# Patient Record
Sex: MALE | Race: WHITE | NOT HISPANIC OR LATINO | Employment: OTHER | URBAN - METROPOLITAN AREA
[De-identification: names, ages, dates, MRNs, and addresses within clinical notes are randomized per-mention and may not be internally consistent; named-entity substitution may affect disease eponyms.]

---

## 2018-05-30 ENCOUNTER — APPOINTMENT (OUTPATIENT)
Dept: RADIOLOGY | Facility: MEDICAL CENTER | Age: 63
DRG: 003 | End: 2018-05-30
Attending: EMERGENCY MEDICINE
Payer: COMMERCIAL

## 2018-05-30 ENCOUNTER — RESOLUTE PROFESSIONAL BILLING HOSPITAL PROF FEE (OUTPATIENT)
Dept: HOSPITALIST | Facility: MEDICAL CENTER | Age: 63
End: 2018-05-30
Payer: COMMERCIAL

## 2018-05-30 ENCOUNTER — HOSPITAL ENCOUNTER (INPATIENT)
Facility: MEDICAL CENTER | Age: 63
LOS: 34 days | DRG: 003 | End: 2018-07-03
Attending: EMERGENCY MEDICINE | Admitting: SURGERY
Payer: COMMERCIAL

## 2018-05-30 DIAGNOSIS — G93.40 ENCEPHALOPATHY ACUTE: ICD-10-CM

## 2018-05-30 DIAGNOSIS — J95.821 RESPIRATORY FAILURE FOLLOWING TRAUMA AND SURGERY (HCC): ICD-10-CM

## 2018-05-30 DIAGNOSIS — I63.10 CEREBROVASCULAR ACCIDENT (CVA) DUE TO EMBOLISM OF PRECEREBRAL ARTERY (HCC): ICD-10-CM

## 2018-05-30 PROBLEM — S42.009A CLOSED FRACTURE OF CLAVICLE: Status: ACTIVE | Noted: 2018-05-30

## 2018-05-30 PROBLEM — S22.41XA CLOSED FRACTURE OF MULTIPLE RIBS OF RIGHT SIDE: Status: ACTIVE | Noted: 2018-05-30

## 2018-05-30 PROBLEM — M89.8X1 CLAVICLE PAIN: Status: ACTIVE | Noted: 2018-05-30

## 2018-05-30 PROBLEM — J94.2 HEMOPNEUMOTHORAX: Status: ACTIVE | Noted: 2018-05-30

## 2018-05-30 PROBLEM — S42.109A SCAPULA FRACTURE: Status: ACTIVE | Noted: 2018-05-30

## 2018-05-30 PROBLEM — T14.90XA TRAUMA: Status: ACTIVE | Noted: 2018-05-30

## 2018-05-30 PROBLEM — Z53.09: Status: ACTIVE | Noted: 2018-05-30

## 2018-05-30 PROBLEM — K76.0 HEPATIC STEATOSIS: Status: ACTIVE | Noted: 2018-05-30

## 2018-05-30 LAB
ABO GROUP BLD: NORMAL
ALBUMIN SERPL BCP-MCNC: 4.3 G/DL (ref 3.2–4.9)
ALBUMIN/GLOB SERPL: 1.4 G/DL
ALP SERPL-CCNC: 71 U/L (ref 30–99)
ALT SERPL-CCNC: 56 U/L (ref 2–50)
ANION GAP SERPL CALC-SCNC: 9 MMOL/L (ref 0–11.9)
APTT PPP: 26.3 SEC (ref 24.7–36)
AST SERPL-CCNC: 51 U/L (ref 12–45)
BILIRUB SERPL-MCNC: 0.5 MG/DL (ref 0.1–1.5)
BLD GP AB SCN SERPL QL: NORMAL
BUN SERPL-MCNC: 16 MG/DL (ref 8–22)
CALCIUM SERPL-MCNC: 9.1 MG/DL (ref 8.5–10.5)
CHLORIDE SERPL-SCNC: 107 MMOL/L (ref 96–112)
CO2 SERPL-SCNC: 24 MMOL/L (ref 20–33)
CREAT SERPL-MCNC: 0.87 MG/DL (ref 0.5–1.4)
ERYTHROCYTE [DISTWIDTH] IN BLOOD BY AUTOMATED COUNT: 40.8 FL (ref 35.9–50)
ETHANOL BLD-MCNC: 0.01 G/DL
GLOBULIN SER CALC-MCNC: 3 G/DL (ref 1.9–3.5)
GLUCOSE SERPL-MCNC: 87 MG/DL (ref 65–99)
HCT VFR BLD AUTO: 44.1 % (ref 42–52)
HGB BLD-MCNC: 15.2 G/DL (ref 14–18)
INR PPP: 1.06 (ref 0.87–1.13)
MCH RBC QN AUTO: 31.3 PG (ref 27–33)
MCHC RBC AUTO-ENTMCNC: 34.5 G/DL (ref 33.7–35.3)
MCV RBC AUTO: 90.7 FL (ref 81.4–97.8)
PLATELET # BLD AUTO: 240 K/UL (ref 164–446)
PMV BLD AUTO: 10.8 FL (ref 9–12.9)
POTASSIUM SERPL-SCNC: 3.9 MMOL/L (ref 3.6–5.5)
PROT SERPL-MCNC: 7.3 G/DL (ref 6–8.2)
PROTHROMBIN TIME: 13.5 SEC (ref 12–14.6)
RBC # BLD AUTO: 4.86 M/UL (ref 4.7–6.1)
RH BLD: NORMAL
SODIUM SERPL-SCNC: 140 MMOL/L (ref 135–145)
WBC # BLD AUTO: 11 K/UL (ref 4.8–10.8)

## 2018-05-30 PROCEDURE — 85730 THROMBOPLASTIN TIME PARTIAL: CPT

## 2018-05-30 PROCEDURE — 86850 RBC ANTIBODY SCREEN: CPT

## 2018-05-30 PROCEDURE — 80053 COMPREHEN METABOLIC PANEL: CPT

## 2018-05-30 PROCEDURE — 72125 CT NECK SPINE W/O DYE: CPT

## 2018-05-30 PROCEDURE — 85027 COMPLETE CBC AUTOMATED: CPT

## 2018-05-30 PROCEDURE — 85610 PROTHROMBIN TIME: CPT

## 2018-05-30 PROCEDURE — 99291 CRITICAL CARE FIRST HOUR: CPT

## 2018-05-30 PROCEDURE — 72131 CT LUMBAR SPINE W/O DYE: CPT

## 2018-05-30 PROCEDURE — 71045 X-RAY EXAM CHEST 1 VIEW: CPT

## 2018-05-30 PROCEDURE — 700117 HCHG RX CONTRAST REV CODE 255: Performed by: EMERGENCY MEDICINE

## 2018-05-30 PROCEDURE — A9270 NON-COVERED ITEM OR SERVICE: HCPCS | Performed by: SURGERY

## 2018-05-30 PROCEDURE — 700112 HCHG RX REV CODE 229: Performed by: SURGERY

## 2018-05-30 PROCEDURE — 73030 X-RAY EXAM OF SHOULDER: CPT | Mod: RT

## 2018-05-30 PROCEDURE — 700111 HCHG RX REV CODE 636 W/ 250 OVERRIDE (IP): Performed by: SURGERY

## 2018-05-30 PROCEDURE — 80307 DRUG TEST PRSMV CHEM ANLYZR: CPT

## 2018-05-30 PROCEDURE — 86901 BLOOD TYPING SEROLOGIC RH(D): CPT

## 2018-05-30 PROCEDURE — 86900 BLOOD TYPING SEROLOGIC ABO: CPT

## 2018-05-30 PROCEDURE — G0390 TRAUMA RESPONS W/HOSP CRITI: HCPCS

## 2018-05-30 PROCEDURE — 700105 HCHG RX REV CODE 258: Performed by: SURGERY

## 2018-05-30 PROCEDURE — 70450 CT HEAD/BRAIN W/O DYE: CPT

## 2018-05-30 PROCEDURE — 770022 HCHG ROOM/CARE - ICU (200)

## 2018-05-30 PROCEDURE — 72128 CT CHEST SPINE W/O DYE: CPT

## 2018-05-30 PROCEDURE — 71260 CT THORAX DX C+: CPT

## 2018-05-30 PROCEDURE — 700102 HCHG RX REV CODE 250 W/ 637 OVERRIDE(OP): Performed by: SURGERY

## 2018-05-30 RX ORDER — CYCLOBENZAPRINE HCL 10 MG
10 TABLET ORAL 3 TIMES DAILY PRN
COMMUNITY

## 2018-05-30 RX ORDER — AMOXICILLIN 250 MG
1 CAPSULE ORAL NIGHTLY
Status: DISCONTINUED | OUTPATIENT
Start: 2018-05-30 | End: 2018-07-04 | Stop reason: HOSPADM

## 2018-05-30 RX ORDER — MORPHINE SULFATE 4 MG/ML
4 INJECTION, SOLUTION INTRAMUSCULAR; INTRAVENOUS
Status: DISCONTINUED | OUTPATIENT
Start: 2018-05-30 | End: 2018-05-30

## 2018-05-30 RX ORDER — ENEMA 19; 7 G/133ML; G/133ML
1 ENEMA RECTAL
Status: DISCONTINUED | OUTPATIENT
Start: 2018-05-30 | End: 2018-07-04 | Stop reason: HOSPADM

## 2018-05-30 RX ORDER — MORPHINE SULFATE 4 MG/ML
4 INJECTION, SOLUTION INTRAMUSCULAR; INTRAVENOUS
Status: DISCONTINUED | OUTPATIENT
Start: 2018-05-30 | End: 2018-06-01

## 2018-05-30 RX ORDER — HYDROCODONE BITARTRATE AND ACETAMINOPHEN 10; 325 MG/1; MG/1
1 TABLET ORAL EVERY 8 HOURS PRN
COMMUNITY

## 2018-05-30 RX ORDER — OXYCODONE HYDROCHLORIDE 10 MG/1
10 TABLET ORAL
Status: DISCONTINUED | OUTPATIENT
Start: 2018-05-30 | End: 2018-06-06

## 2018-05-30 RX ORDER — TAMSULOSIN HYDROCHLORIDE 0.4 MG/1
0.4 CAPSULE ORAL EVERY EVENING
COMMUNITY

## 2018-05-30 RX ORDER — ONDANSETRON 2 MG/ML
4 INJECTION INTRAMUSCULAR; INTRAVENOUS EVERY 4 HOURS PRN
Status: DISCONTINUED | OUTPATIENT
Start: 2018-05-30 | End: 2018-07-04 | Stop reason: HOSPADM

## 2018-05-30 RX ORDER — FLUTICASONE PROPIONATE AND SALMETEROL XINAFOATE 115; 21 UG/1; UG/1
1 AEROSOL, METERED RESPIRATORY (INHALATION) 2 TIMES DAILY
COMMUNITY

## 2018-05-30 RX ORDER — FINASTERIDE 5 MG/1
5 TABLET, FILM COATED ORAL EVERY EVENING
COMMUNITY

## 2018-05-30 RX ORDER — SODIUM CHLORIDE, SODIUM LACTATE, POTASSIUM CHLORIDE, CALCIUM CHLORIDE 600; 310; 30; 20 MG/100ML; MG/100ML; MG/100ML; MG/100ML
INJECTION, SOLUTION INTRAVENOUS CONTINUOUS
Status: DISCONTINUED | OUTPATIENT
Start: 2018-05-30 | End: 2018-06-02

## 2018-05-30 RX ORDER — DOCUSATE SODIUM 100 MG/1
100 CAPSULE, LIQUID FILLED ORAL 2 TIMES DAILY
Status: DISCONTINUED | OUTPATIENT
Start: 2018-05-30 | End: 2018-06-06

## 2018-05-30 RX ORDER — AMOXICILLIN 250 MG
1 CAPSULE ORAL
Status: DISCONTINUED | OUTPATIENT
Start: 2018-05-30 | End: 2018-07-04 | Stop reason: HOSPADM

## 2018-05-30 RX ORDER — ALBUTEROL SULFATE 90 UG/1
2 AEROSOL, METERED RESPIRATORY (INHALATION) EVERY 4 HOURS PRN
Status: DISCONTINUED | OUTPATIENT
Start: 2018-05-30 | End: 2018-07-04 | Stop reason: HOSPADM

## 2018-05-30 RX ORDER — BUDESONIDE AND FORMOTEROL FUMARATE DIHYDRATE 160; 4.5 UG/1; UG/1
2 AEROSOL RESPIRATORY (INHALATION) 2 TIMES DAILY
Status: DISCONTINUED | OUTPATIENT
Start: 2018-05-30 | End: 2018-06-01 | Stop reason: SDUPTHER

## 2018-05-30 RX ORDER — BISACODYL 10 MG
10 SUPPOSITORY, RECTAL RECTAL
Status: DISCONTINUED | OUTPATIENT
Start: 2018-05-30 | End: 2018-07-04 | Stop reason: HOSPADM

## 2018-05-30 RX ORDER — NAPROXEN 500 MG/1
500 TABLET ORAL 2 TIMES DAILY PRN
COMMUNITY

## 2018-05-30 RX ORDER — OXYCODONE HYDROCHLORIDE 5 MG/1
5 TABLET ORAL
Status: DISCONTINUED | OUTPATIENT
Start: 2018-05-30 | End: 2018-06-06

## 2018-05-30 RX ORDER — KETOROLAC TROMETHAMINE 30 MG/ML
30 INJECTION, SOLUTION INTRAMUSCULAR; INTRAVENOUS EVERY 6 HOURS
Status: DISCONTINUED | OUTPATIENT
Start: 2018-05-30 | End: 2018-05-31

## 2018-05-30 RX ORDER — POLYETHYLENE GLYCOL 3350 17 G/17G
1 POWDER, FOR SOLUTION ORAL 2 TIMES DAILY
Status: DISCONTINUED | OUTPATIENT
Start: 2018-05-30 | End: 2018-07-04 | Stop reason: HOSPADM

## 2018-05-30 RX ORDER — FAMOTIDINE 20 MG/1
20 TABLET, FILM COATED ORAL 2 TIMES DAILY
Status: DISCONTINUED | OUTPATIENT
Start: 2018-05-30 | End: 2018-07-04 | Stop reason: HOSPADM

## 2018-05-30 RX ADMIN — FAMOTIDINE 20 MG: 20 TABLET ORAL at 21:12

## 2018-05-30 RX ADMIN — MORPHINE SULFATE 4 MG: 4 INJECTION INTRAVENOUS at 22:45

## 2018-05-30 RX ADMIN — MORPHINE SULFATE 4 MG: 4 INJECTION INTRAVENOUS at 20:00

## 2018-05-30 RX ADMIN — OXYCODONE HYDROCHLORIDE 10 MG: 10 TABLET ORAL at 17:16

## 2018-05-30 RX ADMIN — KETOROLAC TROMETHAMINE 30 MG: 30 INJECTION, SOLUTION INTRAMUSCULAR; INTRAVENOUS at 23:30

## 2018-05-30 RX ADMIN — IOHEXOL 100 ML: 350 INJECTION, SOLUTION INTRAVENOUS at 16:02

## 2018-05-30 RX ADMIN — DOCUSATE SODIUM 100 MG: 100 CAPSULE ORAL at 21:11

## 2018-05-30 RX ADMIN — OXYCODONE HYDROCHLORIDE 10 MG: 10 TABLET ORAL at 21:15

## 2018-05-30 RX ADMIN — SENNOSIDES AND DOCUSATE SODIUM 1 TABLET: 8.6; 5 TABLET ORAL at 21:12

## 2018-05-30 RX ADMIN — KETOROLAC TROMETHAMINE 30 MG: 30 INJECTION, SOLUTION INTRAMUSCULAR; INTRAVENOUS at 17:14

## 2018-05-30 RX ADMIN — SODIUM CHLORIDE, POTASSIUM CHLORIDE, SODIUM LACTATE AND CALCIUM CHLORIDE: 600; 310; 30; 20 INJECTION, SOLUTION INTRAVENOUS at 17:15

## 2018-05-30 RX ADMIN — MORPHINE SULFATE 4 MG: 4 INJECTION INTRAVENOUS at 17:12

## 2018-05-30 ASSESSMENT — PAIN SCALES - GENERAL
PAINLEVEL_OUTOF10: 8
PAINLEVEL_OUTOF10: 4
PAINLEVEL_OUTOF10: 9
PAINLEVEL_OUTOF10: 7
PAINLEVEL_OUTOF10: 6
PAINLEVEL_OUTOF10: 8
PAINLEVEL_OUTOF10: 7

## 2018-05-30 ASSESSMENT — COPD QUESTIONNAIRES
DO YOU EVER COUGH UP ANY MUCUS OR PHLEGM?: NO/ONLY WITH OCCASIONAL COLDS OR INFECTIONS
DURING THE PAST 4 WEEKS HOW MUCH DID YOU FEEL SHORT OF BREATH: NONE/LITTLE OF THE TIME
COPD SCREENING SCORE: 2
HAVE YOU SMOKED AT LEAST 100 CIGARETTES IN YOUR ENTIRE LIFE: NO/DON'T KNOW
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
## 2018-05-31 ENCOUNTER — APPOINTMENT (OUTPATIENT)
Dept: RADIOLOGY | Facility: MEDICAL CENTER | Age: 63
DRG: 003 | End: 2018-05-31
Attending: SURGERY
Payer: COMMERCIAL

## 2018-05-31 PROBLEM — S22.5XXA FLAIL CHEST, INITIAL ENCOUNTER FOR CLOSED FRACTURE: Status: ACTIVE | Noted: 2018-05-30

## 2018-05-31 LAB
ABO GROUP BLD: NORMAL
ALBUMIN SERPL BCP-MCNC: 3.9 G/DL (ref 3.2–4.9)
ALBUMIN/GLOB SERPL: 1.4 G/DL
ALP SERPL-CCNC: 70 U/L (ref 30–99)
ALT SERPL-CCNC: 48 U/L (ref 2–50)
ANION GAP SERPL CALC-SCNC: 12 MMOL/L (ref 0–11.9)
AST SERPL-CCNC: 50 U/L (ref 12–45)
BASOPHILS # BLD AUTO: 0.3 % (ref 0–1.8)
BASOPHILS # BLD: 0.02 K/UL (ref 0–0.12)
BILIRUB SERPL-MCNC: 0.7 MG/DL (ref 0.1–1.5)
BUN SERPL-MCNC: 18 MG/DL (ref 8–22)
CALCIUM SERPL-MCNC: 9 MG/DL (ref 8.5–10.5)
CFT BLD TEG: 5.3 MIN (ref 5–10)
CHLORIDE SERPL-SCNC: 106 MMOL/L (ref 96–112)
CLOT ANGLE BLD TEG: 64.3 DEGREES (ref 53–72)
CLOT LYSIS 30M P MA LENFR BLD TEG: 13.4 % (ref 0–8)
CO2 SERPL-SCNC: 22 MMOL/L (ref 20–33)
CREAT SERPL-MCNC: 0.98 MG/DL (ref 0.5–1.4)
CT.EXTRINSIC BLD ROTEM: 2 MIN (ref 1–3)
EOSINOPHIL # BLD AUTO: 0.2 K/UL (ref 0–0.51)
EOSINOPHIL NFR BLD: 2.6 % (ref 0–6.9)
ERYTHROCYTE [DISTWIDTH] IN BLOOD BY AUTOMATED COUNT: 42.5 FL (ref 35.9–50)
GLOBULIN SER CALC-MCNC: 2.8 G/DL (ref 1.9–3.5)
GLUCOSE SERPL-MCNC: 110 MG/DL (ref 65–99)
HCT VFR BLD AUTO: 40.6 % (ref 42–52)
HGB BLD-MCNC: 13.9 G/DL (ref 14–18)
IMM GRANULOCYTES # BLD AUTO: 0.01 K/UL (ref 0–0.11)
IMM GRANULOCYTES NFR BLD AUTO: 0.1 % (ref 0–0.9)
LYMPHOCYTES # BLD AUTO: 1.5 K/UL (ref 1–4.8)
LYMPHOCYTES NFR BLD: 19.4 % (ref 22–41)
MAGNESIUM SERPL-MCNC: 2.2 MG/DL (ref 1.5–2.5)
MCF BLD TEG: 63.2 MM (ref 50–70)
MCH RBC QN AUTO: 31.2 PG (ref 27–33)
MCHC RBC AUTO-ENTMCNC: 34.2 G/DL (ref 33.7–35.3)
MCV RBC AUTO: 91 FL (ref 81.4–97.8)
MONOCYTES # BLD AUTO: 0.62 K/UL (ref 0–0.85)
MONOCYTES NFR BLD AUTO: 8 % (ref 0–13.4)
NEUTROPHILS # BLD AUTO: 5.38 K/UL (ref 1.82–7.42)
NEUTROPHILS NFR BLD: 69.6 % (ref 44–72)
NRBC # BLD AUTO: 0 K/UL
NRBC BLD-RTO: 0 /100 WBC
PA AA BLD-ACNC: 83 %
PA ADP BLD-ACNC: 9.5 %
PHOSPHATE SERPL-MCNC: 3.7 MG/DL (ref 2.5–4.5)
PLATELET # BLD AUTO: 215 K/UL (ref 164–446)
PMV BLD AUTO: 10.9 FL (ref 9–12.9)
POTASSIUM SERPL-SCNC: 4 MMOL/L (ref 3.6–5.5)
PROT SERPL-MCNC: 6.7 G/DL (ref 6–8.2)
RBC # BLD AUTO: 4.46 M/UL (ref 4.7–6.1)
RH BLD: NORMAL
SODIUM SERPL-SCNC: 140 MMOL/L (ref 135–145)
TEG ALGORITHM TGALG: ABNORMAL
WBC # BLD AUTO: 7.7 K/UL (ref 4.8–10.8)

## 2018-05-31 PROCEDURE — 85384 FIBRINOGEN ACTIVITY: CPT

## 2018-05-31 PROCEDURE — A9270 NON-COVERED ITEM OR SERVICE: HCPCS | Performed by: SURGERY

## 2018-05-31 PROCEDURE — 700111 HCHG RX REV CODE 636 W/ 250 OVERRIDE (IP): Performed by: SURGERY

## 2018-05-31 PROCEDURE — 770022 HCHG ROOM/CARE - ICU (200)

## 2018-05-31 PROCEDURE — 700102 HCHG RX REV CODE 250 W/ 637 OVERRIDE(OP): Performed by: SURGERY

## 2018-05-31 PROCEDURE — 80053 COMPREHEN METABOLIC PANEL: CPT

## 2018-05-31 PROCEDURE — 99233 SBSQ HOSP IP/OBS HIGH 50: CPT | Performed by: SURGERY

## 2018-05-31 PROCEDURE — 85576 BLOOD PLATELET AGGREGATION: CPT | Mod: 91

## 2018-05-31 PROCEDURE — 85347 COAGULATION TIME ACTIVATED: CPT

## 2018-05-31 PROCEDURE — 700112 HCHG RX REV CODE 229: Performed by: SURGERY

## 2018-05-31 PROCEDURE — 84100 ASSAY OF PHOSPHORUS: CPT

## 2018-05-31 PROCEDURE — 51798 US URINE CAPACITY MEASURE: CPT

## 2018-05-31 PROCEDURE — 71045 X-RAY EXAM CHEST 1 VIEW: CPT

## 2018-05-31 PROCEDURE — 85025 COMPLETE CBC W/AUTO DIFF WBC: CPT

## 2018-05-31 PROCEDURE — 700101 HCHG RX REV CODE 250: Performed by: SURGERY

## 2018-05-31 PROCEDURE — 700105 HCHG RX REV CODE 258: Performed by: SURGERY

## 2018-05-31 PROCEDURE — 83735 ASSAY OF MAGNESIUM: CPT

## 2018-05-31 RX ORDER — TAMSULOSIN HYDROCHLORIDE 0.4 MG/1
0.4 CAPSULE ORAL
Status: DISCONTINUED | OUTPATIENT
Start: 2018-05-31 | End: 2018-06-01

## 2018-05-31 RX ORDER — GABAPENTIN 100 MG/1
100 CAPSULE ORAL EVERY 8 HOURS
Status: DISCONTINUED | OUTPATIENT
Start: 2018-05-31 | End: 2018-05-31

## 2018-05-31 RX ORDER — ACETAMINOPHEN 325 MG/1
650 TABLET ORAL EVERY 6 HOURS
Status: DISCONTINUED | OUTPATIENT
Start: 2018-05-31 | End: 2018-06-08

## 2018-05-31 RX ORDER — ACETAMINOPHEN 650 MG/1
650 SUPPOSITORY RECTAL EVERY 4 HOURS PRN
Status: DISCONTINUED | OUTPATIENT
Start: 2018-05-31 | End: 2018-05-31

## 2018-05-31 RX ORDER — IPRATROPIUM BROMIDE AND ALBUTEROL SULFATE 2.5; .5 MG/3ML; MG/3ML
3 SOLUTION RESPIRATORY (INHALATION)
Status: DISCONTINUED | OUTPATIENT
Start: 2018-05-31 | End: 2018-07-04 | Stop reason: HOSPADM

## 2018-05-31 RX ORDER — ACETAMINOPHEN 325 MG/1
650 TABLET ORAL EVERY 4 HOURS PRN
Status: DISCONTINUED | OUTPATIENT
Start: 2018-05-31 | End: 2018-05-31

## 2018-05-31 RX ORDER — CELECOXIB 200 MG/1
200 CAPSULE ORAL DAILY
Status: DISCONTINUED | OUTPATIENT
Start: 2018-05-31 | End: 2018-06-10

## 2018-05-31 RX ADMIN — DOCUSATE SODIUM 100 MG: 100 CAPSULE ORAL at 07:48

## 2018-05-31 RX ADMIN — OXYCODONE HYDROCHLORIDE 10 MG: 10 TABLET ORAL at 03:00

## 2018-05-31 RX ADMIN — SODIUM CHLORIDE, POTASSIUM CHLORIDE, SODIUM LACTATE AND CALCIUM CHLORIDE: 600; 310; 30; 20 INJECTION, SOLUTION INTRAVENOUS at 13:26

## 2018-05-31 RX ADMIN — MORPHINE SULFATE 4 MG: 4 INJECTION INTRAVENOUS at 01:30

## 2018-05-31 RX ADMIN — IPRATROPIUM BROMIDE AND ALBUTEROL SULFATE 3 ML: .5; 3 SOLUTION RESPIRATORY (INHALATION) at 19:04

## 2018-05-31 RX ADMIN — MORPHINE SULFATE 4 MG: 4 INJECTION INTRAVENOUS at 16:56

## 2018-05-31 RX ADMIN — POLYETHYLENE GLYCOL 3350 1 PACKET: 17 POWDER, FOR SOLUTION ORAL at 07:49

## 2018-05-31 RX ADMIN — ACETAMINOPHEN 650 MG: 325 TABLET, FILM COATED ORAL at 23:49

## 2018-05-31 RX ADMIN — MORPHINE SULFATE 4 MG: 4 INJECTION INTRAVENOUS at 17:59

## 2018-05-31 RX ADMIN — OXYCODONE HYDROCHLORIDE 10 MG: 10 TABLET ORAL at 00:00

## 2018-05-31 RX ADMIN — MORPHINE SULFATE 2 MG: 4 INJECTION INTRAVENOUS at 00:30

## 2018-05-31 RX ADMIN — ACETAMINOPHEN 650 MG: 325 TABLET, FILM COATED ORAL at 13:23

## 2018-05-31 RX ADMIN — BUDESONIDE AND FORMOTEROL FUMARATE DIHYDRATE 2 PUFF: 160; 4.5 AEROSOL RESPIRATORY (INHALATION) at 07:51

## 2018-05-31 RX ADMIN — BUDESONIDE AND FORMOTEROL FUMARATE DIHYDRATE 2 PUFF: 160; 4.5 AEROSOL RESPIRATORY (INHALATION) at 00:00

## 2018-05-31 RX ADMIN — SENNOSIDES AND DOCUSATE SODIUM 1 TABLET: 8.6; 5 TABLET ORAL at 20:23

## 2018-05-31 RX ADMIN — BUDESONIDE AND FORMOTEROL FUMARATE DIHYDRATE 2 PUFF: 160; 4.5 AEROSOL RESPIRATORY (INHALATION) at 21:00

## 2018-05-31 RX ADMIN — POLYETHYLENE GLYCOL 3350 1 PACKET: 17 POWDER, FOR SOLUTION ORAL at 20:22

## 2018-05-31 RX ADMIN — ONDANSETRON HYDROCHLORIDE 4 MG: 2 INJECTION, SOLUTION INTRAMUSCULAR; INTRAVENOUS at 18:38

## 2018-05-31 RX ADMIN — OXYCODONE HYDROCHLORIDE 10 MG: 10 TABLET ORAL at 20:30

## 2018-05-31 RX ADMIN — OXYCODONE HYDROCHLORIDE 10 MG: 10 TABLET ORAL at 06:00

## 2018-05-31 RX ADMIN — OXYCODONE HYDROCHLORIDE 10 MG: 10 TABLET ORAL at 14:19

## 2018-05-31 RX ADMIN — CELECOXIB 200 MG: 200 CAPSULE ORAL at 08:48

## 2018-05-31 RX ADMIN — TAMSULOSIN HYDROCHLORIDE 0.4 MG: 0.4 CAPSULE ORAL at 20:23

## 2018-05-31 RX ADMIN — OXYCODONE HYDROCHLORIDE 10 MG: 10 TABLET ORAL at 10:34

## 2018-05-31 RX ADMIN — ACETAMINOPHEN 650 MG: 325 TABLET, FILM COATED ORAL at 17:00

## 2018-05-31 RX ADMIN — MORPHINE SULFATE 4 MG: 4 INJECTION INTRAVENOUS at 05:00

## 2018-05-31 RX ADMIN — MORPHINE SULFATE 4 MG: 4 INJECTION INTRAVENOUS at 02:15

## 2018-05-31 RX ADMIN — FAMOTIDINE 20 MG: 20 TABLET ORAL at 07:48

## 2018-05-31 RX ADMIN — MORPHINE SULFATE 4 MG: 4 INJECTION INTRAVENOUS at 10:54

## 2018-05-31 RX ADMIN — KETOROLAC TROMETHAMINE 30 MG: 30 INJECTION, SOLUTION INTRAMUSCULAR; INTRAVENOUS at 06:00

## 2018-05-31 RX ADMIN — MORPHINE SULFATE 4 MG: 4 INJECTION INTRAVENOUS at 04:00

## 2018-05-31 RX ADMIN — FAMOTIDINE 20 MG: 20 TABLET ORAL at 20:23

## 2018-05-31 RX ADMIN — MORPHINE SULFATE 4 MG: 4 INJECTION INTRAVENOUS at 22:41

## 2018-05-31 RX ADMIN — DOCUSATE SODIUM 100 MG: 100 CAPSULE ORAL at 20:23

## 2018-05-31 RX ADMIN — MORPHINE SULFATE 4 MG: 4 INJECTION INTRAVENOUS at 08:49

## 2018-05-31 ASSESSMENT — PAIN SCALES - GENERAL
PAINLEVEL_OUTOF10: 6
PAINLEVEL_OUTOF10: 5
PAINLEVEL_OUTOF10: 7
PAINLEVEL_OUTOF10: 7
PAINLEVEL_OUTOF10: 8
PAINLEVEL_OUTOF10: 5
PAINLEVEL_OUTOF10: 7
PAINLEVEL_OUTOF10: 6
PAINLEVEL_OUTOF10: 4
PAINLEVEL_OUTOF10: 10
PAINLEVEL_OUTOF10: 8
PAINLEVEL_OUTOF10: 8
PAINLEVEL_OUTOF10: 6
PAINLEVEL_OUTOF10: 3
PAINLEVEL_OUTOF10: 4
PAINLEVEL_OUTOF10: 4
PAINLEVEL_OUTOF10: 2
PAINLEVEL_OUTOF10: 6
PAINLEVEL_OUTOF10: 5
PAINLEVEL_OUTOF10: 3
PAINLEVEL_OUTOF10: 2
PAINLEVEL_OUTOF10: 6
PAINLEVEL_OUTOF10: 7

## 2018-05-31 NOTE — CARE PLAN
Problem: Pain Management  Goal: Pain level will decrease to patient's comfort goal    Intervention: Follow pain managment plan developed in collaboration with patient and Interdisciplinary Team  Patients pain poorly controlled with current pharmacologic pain regimen; Non-pharmacologic measures utilized: repositioning, ice pack and Right arm immobilization      Problem: Mobility  Goal: Risk for activity intolerance will decrease    Intervention: Assess and monitor signs of activity intolerance  Patient  mobilized to EOB x 5 minutes; Pt did not tolerate EOB; Patient unable to participate in mobilization with maximum assist.
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
## 2018-05-31 NOTE — PROGRESS NOTES
HD#2  Right clavicle fracture  Sling for comfort  If no improvement in pain after several days can repeat upright clavicle xrays and consider surgical intervention  No surgery required at this point

## 2018-05-31 NOTE — CONSULTS
"5/30/2018    Chelita Drew is a 63 y.o. male who presents after a longterm with right shoulder pain and is here for management. Patient denies numbness, parasthesias, loss of concousness or other trauma    Past Medical History:   Diagnosis Date   • BPH (benign prostatic hyperplasia)    • COPD (chronic obstructive pulmonary disease) (HCC)    • History of pulmonary embolus (PE)        History reviewed. No pertinent surgical history.    Medications  No current facility-administered medications on file prior to encounter.      No current outpatient prescriptions on file prior to encounter.       Allergies  Lyrica    ROS  Right shoulder pain. All other systems were reviewed and found to be negative    History reviewed. No pertinent family history.    Social History     Social History   • Marital status:      Spouse name: N/A   • Number of children: N/A   • Years of education: N/A     Social History Main Topics   • Smoking status: Never Smoker   • Smokeless tobacco: Not on file   • Alcohol use Yes      Comment: occ   • Drug use: No   • Sexual activity: Not on file     Other Topics Concern   • Not on file     Social History Narrative   • No narrative on file       Physical Exam  Vitals  Blood pressure 138/100, pulse 61, temperature 36.7 °C (98 °F), resp. rate 16, height 1.88 m (6' 2\"), weight 104.3 kg (230 lb), SpO2 95 %.  General: Well Developed, Well Nourished, no acute distress  HEENT: Normocephalic, atraumatic  Eyes: Anicteric, PERRLA, EOMI  Neck: Supple, nontender, no masses  Lungs: CTA, no wheezes or crackles  Heart: RRR, no murmurs, rubs or gallops  Abdomen: Soft, NT, ND  Pelvis: Stable to AP and Lateral Compression  Skin: Intact, no open wounds  Extremities: Right clavicle pain and crepitance  Neuro: M/R/U/A intact  Vascular: 2+Rad/uln, Capillary refill <2 seconds    Radiographs:  CT-CHEST,ABDOMEN,PELVIS WITH   Final Result      1.  Multiple right rib fractures including multisegmented fractures and displaced " fourth through sixth rib fractures.   2.  Small hemopneumothorax.   3.  Atelectasis and or contusions within the right lung and within the left lower lobe.   4.  Comminuted angulated fracture of the distal right clavicle incompletely imaged.   5.  Right scapula is incompletely imaged.   6.  Aorta appears intact. No mediastinal or retroperitoneal hematoma.   7.  No intra-abdominal solid organ injury identified.   8.  Diverticulosis of the colon.   9.  No free fluid or free air.   10.  Hepatic steatosis and mild splenomegaly.   Findings were discussed with YANY CISNEROS on 5/30/2018 4:22 PM.      CT-LSPINE W/O PLUS RECONS   Final Result      Degenerative changes as above described.      Hepatic steatosis.      Colonic diverticulosis.      Small right hemothorax with overlying atelectasis/contusion.      CT-TSPINE W/O PLUS RECONS   Final Result      Minute right pneumothorax.      Small right hemothorax overlying atelectasis/contusion. Ground glass opacities in the right upper lobe likely represent small pulmonary contusions.      Soft tissue air in the posterior right hemithorax and right supraclavicular region.      Multiple right-sided rib fractures.      Degenerative changes in the thoracic spine.         CT-CSPINE WITHOUT PLUS RECONS   Final Result      Multilevel degenerative changes in the cervical spine.      Comminuted fractures of the right first, second and third ribs.      Patchy groundglass opacity at the right lung apex may represent a contusion.      Soft tissue air in the right supraclavicular region and posterior right hemithorax.      Air-fluid level in the left maxillary sinus can be seen in acute sinusitis.      CT-HEAD W/O   Final Result      No acute intracranial abnormality is identified.      Paranasal sinus disease.      Frontal soft tissue swelling.         DX-CHEST-LIMITED (1 VIEW)   Final Result      1.  Multiple right rib fractures and possible right clavicle and scapular fractures.   2.   Possible trace right pneumothorax.   3.  Left lung base atelectasis.   Findings were discussed with YANY CISNEROS on 5/30/2018 3:36 PM.      DX-SHOULDER 2+ RIGHT   Final Result         1.  Normal shoulder series.      2.  Fractures of the right fourth through sixth ribs laterally.          Laboratory Values  Recent Labs      05/30/18   1505   WBC  11.0*   RBC  4.86   HEMOGLOBIN  15.2   HEMATOCRIT  44.1   MCV  90.7   MCH  31.3   MCHC  34.5   RDW  40.8   PLATELETCT  240   MPV  10.8     Recent Labs      05/30/18   1513   SODIUM  140   POTASSIUM  3.9   CHLORIDE  107   CO2  24   GLUCOSE  87   BUN  16     Recent Labs      05/30/18   1505   APTT  26.3   INR  1.06         Impression: Right clavicle and scapular body fracture    Plan: No surgery required at this time. Sling for comfort. May require ORIF if too painful

## 2018-05-31 NOTE — PROGRESS NOTES
Arm sling has been sized and fitted to PTs RUE.please call traction at x [phone]  For further assistance

## 2018-05-31 NOTE — CARE PLAN
Problem: Bowel/Gastric:  Goal: Normal bowel function is maintained or improved    Intervention: Educate patient and significant other/support system about diet, fluid intake, medications and activity to promote bowel function  Patient has diet advanced to regular but he is reluctant to eat solid food at this time. He has had a past issue with ilius  Intervention: Educate patient and significant other/support system about signs and symptoms of constipation and interventions to implement  Patient is hypervigilant on bowel regiment.  Intervention: Collaborate with Interdisciplinary Team for optimal positioning for bowel evacuation  Patient will be oob to chair with the ability to use bsc      Problem: Pain Management  Goal: Pain level will decrease to patient's comfort goal    Intervention: Follow pain managment plan developed in collaboration with patient and Interdisciplinary Team  Following multimodal pain management plan  Intervention: Educate and implement non-pharmacologic comfort measures. Examples: relaxation, distration, play therapy, activity therapy, massage, etc.  Patient is egar to ambulate and is well educated on pain management plan

## 2018-05-31 NOTE — CARE PLAN
Problem: Hyperinflation:  Goal: Prevent or improve atelectasis  Outcome: PROGRESSING AS EXPECTED    Intervention: Instruct incentive spirometry usage  Continue with PEP and IS.
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
## 2018-06-01 ENCOUNTER — APPOINTMENT (OUTPATIENT)
Dept: RADIOLOGY | Facility: MEDICAL CENTER | Age: 63
DRG: 003 | End: 2018-06-01
Attending: SURGERY
Payer: COMMERCIAL

## 2018-06-01 PROBLEM — J95.821 RESPIRATORY FAILURE FOLLOWING TRAUMA AND SURGERY (HCC): Status: ACTIVE | Noted: 2018-06-01

## 2018-06-01 LAB
ACTION RANGE TRIGGERED IACRT: NO
ACTION RANGE TRIGGERED IACRT: YES
ACTION RANGE TRIGGERED IACRT: YES
ALBUMIN SERPL BCP-MCNC: 3.3 G/DL (ref 3.2–4.9)
ALBUMIN/GLOB SERPL: 1.4 G/DL
ALP SERPL-CCNC: 56 U/L (ref 30–99)
ALT SERPL-CCNC: 37 U/L (ref 2–50)
ANION GAP SERPL CALC-SCNC: 9 MMOL/L (ref 0–11.9)
AST SERPL-CCNC: 36 U/L (ref 12–45)
BASE EXCESS BLDA CALC-SCNC: -1 MMOL/L (ref -4–3)
BASE EXCESS BLDA CALC-SCNC: -2 MMOL/L (ref -4–3)
BASE EXCESS BLDA CALC-SCNC: -2 MMOL/L (ref -4–3)
BASOPHILS # BLD AUTO: 0.1 % (ref 0–1.8)
BASOPHILS # BLD: 0.02 K/UL (ref 0–0.12)
BILIRUB SERPL-MCNC: 0.8 MG/DL (ref 0.1–1.5)
BODY TEMPERATURE: ABNORMAL DEGREES
BUN SERPL-MCNC: 17 MG/DL (ref 8–22)
CALCIUM SERPL-MCNC: 8.1 MG/DL (ref 8.5–10.5)
CFT BLD TEG: 4.8 MIN (ref 5–10)
CHLORIDE SERPL-SCNC: 102 MMOL/L (ref 96–112)
CLOT ANGLE BLD TEG: 64.1 DEGREES (ref 53–72)
CLOT LYSIS 30M P MA LENFR BLD TEG: 0 % (ref 0–8)
CO2 BLDA-SCNC: 26 MMOL/L (ref 20–33)
CO2 BLDA-SCNC: 27 MMOL/L (ref 20–33)
CO2 BLDA-SCNC: 27 MMOL/L (ref 20–33)
CO2 SERPL-SCNC: 24 MMOL/L (ref 20–33)
CREAT SERPL-MCNC: 0.7 MG/DL (ref 0.5–1.4)
CT.EXTRINSIC BLD ROTEM: 1.7 MIN (ref 1–3)
EOSINOPHIL # BLD AUTO: 0.09 K/UL (ref 0–0.51)
EOSINOPHIL NFR BLD: 0.5 % (ref 0–6.9)
ERYTHROCYTE [DISTWIDTH] IN BLOOD BY AUTOMATED COUNT: 45 FL (ref 35.9–50)
GLOBULIN SER CALC-MCNC: 2.4 G/DL (ref 1.9–3.5)
GLUCOSE SERPL-MCNC: 91 MG/DL (ref 65–99)
HCO3 BLDA-SCNC: 24.3 MMOL/L (ref 17–25)
HCO3 BLDA-SCNC: 25.1 MMOL/L (ref 17–25)
HCO3 BLDA-SCNC: 25.6 MMOL/L (ref 17–25)
HCT VFR BLD AUTO: 36.3 % (ref 42–52)
HGB BLD-MCNC: 12.3 G/DL (ref 14–18)
IMM GRANULOCYTES # BLD AUTO: 0.1 K/UL (ref 0–0.11)
IMM GRANULOCYTES NFR BLD AUTO: 0.6 % (ref 0–0.9)
INST. QUALIFIED PATIENT IIQPT: YES
LYMPHOCYTES # BLD AUTO: 1.01 K/UL (ref 1–4.8)
LYMPHOCYTES NFR BLD: 6 % (ref 22–41)
MCF BLD TEG: 68 MM (ref 50–70)
MCH RBC QN AUTO: 31.9 PG (ref 27–33)
MCHC RBC AUTO-ENTMCNC: 33.9 G/DL (ref 33.7–35.3)
MCV RBC AUTO: 94 FL (ref 81.4–97.8)
MONOCYTES # BLD AUTO: 0.71 K/UL (ref 0–0.85)
MONOCYTES NFR BLD AUTO: 4.2 % (ref 0–13.4)
NEUTROPHILS # BLD AUTO: 14.85 K/UL (ref 1.82–7.42)
NEUTROPHILS NFR BLD: 88.6 % (ref 44–72)
NRBC # BLD AUTO: 0 K/UL
NRBC BLD-RTO: 0 /100 WBC
O2/TOTAL GAS SETTING VFR VENT: 100 %
O2/TOTAL GAS SETTING VFR VENT: 100 %
O2/TOTAL GAS SETTING VFR VENT: 80 %
PA AA BLD-ACNC: 78.6 %
PA ADP BLD-ACNC: 96.4 %
PCO2 BLDA: 47.5 MMHG (ref 26–37)
PCO2 BLDA: 47.9 MMHG (ref 26–37)
PCO2 BLDA: 53.9 MMHG (ref 26–37)
PCO2 TEMP ADJ BLDA: 49.4 MMHG (ref 26–37)
PCO2 TEMP ADJ BLDA: 50.2 MMHG (ref 26–37)
PCO2 TEMP ADJ BLDA: 54.8 MMHG (ref 26–37)
PH BLDA: 7.28 [PH] (ref 7.4–7.5)
PH BLDA: 7.32 [PH] (ref 7.4–7.5)
PH BLDA: 7.34 [PH] (ref 7.4–7.5)
PH TEMP ADJ BLDA: 7.27 [PH] (ref 7.4–7.5)
PH TEMP ADJ BLDA: 7.3 [PH] (ref 7.4–7.5)
PH TEMP ADJ BLDA: 7.32 [PH] (ref 7.4–7.5)
PLATELET # BLD AUTO: 162 K/UL (ref 164–446)
PMV BLD AUTO: 10.9 FL (ref 9–12.9)
PO2 BLDA: 63 MMHG (ref 64–87)
PO2 BLDA: 68 MMHG (ref 64–87)
PO2 BLDA: 91 MMHG (ref 64–87)
PO2 TEMP ADJ BLDA: 64 MMHG (ref 64–87)
PO2 TEMP ADJ BLDA: 74 MMHG (ref 64–87)
PO2 TEMP ADJ BLDA: 96 MMHG (ref 64–87)
POTASSIUM SERPL-SCNC: 4.1 MMOL/L (ref 3.6–5.5)
PROT SERPL-MCNC: 5.7 G/DL (ref 6–8.2)
RBC # BLD AUTO: 3.86 M/UL (ref 4.7–6.1)
SAO2 % BLDA: 88 % (ref 93–99)
SAO2 % BLDA: 92 % (ref 93–99)
SAO2 % BLDA: 96 % (ref 93–99)
SODIUM SERPL-SCNC: 135 MMOL/L (ref 135–145)
SPECIMEN DRAWN FROM PATIENT: ABNORMAL
TEG ALGORITHM TGALG: ABNORMAL
TRIGL SERPL-MCNC: 79 MG/DL (ref 0–149)
WBC # BLD AUTO: 16.8 K/UL (ref 4.8–10.8)

## 2018-06-01 PROCEDURE — 94640 AIRWAY INHALATION TREATMENT: CPT

## 2018-06-01 PROCEDURE — C1729 CATH, DRAINAGE: HCPCS

## 2018-06-01 PROCEDURE — 94002 VENT MGMT INPAT INIT DAY: CPT

## 2018-06-01 PROCEDURE — 5A1955Z RESPIRATORY VENTILATION, GREATER THAN 96 CONSECUTIVE HOURS: ICD-10-PCS | Performed by: SURGERY

## 2018-06-01 PROCEDURE — 92950 HEART/LUNG RESUSCITATION CPR: CPT

## 2018-06-01 PROCEDURE — 31500 INSERT EMERGENCY AIRWAY: CPT

## 2018-06-01 PROCEDURE — 71045 X-RAY EXAM CHEST 1 VIEW: CPT

## 2018-06-01 PROCEDURE — 700111 HCHG RX REV CODE 636 W/ 250 OVERRIDE (IP): Performed by: SURGERY

## 2018-06-01 PROCEDURE — A9270 NON-COVERED ITEM OR SERVICE: HCPCS | Performed by: SURGERY

## 2018-06-01 PROCEDURE — 700101 HCHG RX REV CODE 250: Performed by: SURGERY

## 2018-06-01 PROCEDURE — 36600 WITHDRAWAL OF ARTERIAL BLOOD: CPT

## 2018-06-01 PROCEDURE — 302977 HCHG BRONCHOSCOPY PROC-THERAPEUTIC

## 2018-06-01 PROCEDURE — 99153 MOD SED SAME PHYS/QHP EA: CPT

## 2018-06-01 PROCEDURE — 82803 BLOOD GASES ANY COMBINATION: CPT

## 2018-06-01 PROCEDURE — 85576 BLOOD PLATELET AGGREGATION: CPT | Mod: 91

## 2018-06-01 PROCEDURE — 31500 INSERT EMERGENCY AIRWAY: CPT | Performed by: SURGERY

## 2018-06-01 PROCEDURE — 700102 HCHG RX REV CODE 250 W/ 637 OVERRIDE(OP): Performed by: SURGERY

## 2018-06-01 PROCEDURE — 32551 INSERTION OF CHEST TUBE: CPT

## 2018-06-01 PROCEDURE — 99291 CRITICAL CARE FIRST HOUR: CPT | Mod: 25 | Performed by: SURGERY

## 2018-06-01 PROCEDURE — 85025 COMPLETE CBC W/AUTO DIFF WBC: CPT

## 2018-06-01 PROCEDURE — 84478 ASSAY OF TRIGLYCERIDES: CPT

## 2018-06-01 PROCEDURE — 700112 HCHG RX REV CODE 229: Performed by: SURGERY

## 2018-06-01 PROCEDURE — 770022 HCHG ROOM/CARE - ICU (200)

## 2018-06-01 PROCEDURE — 85384 FIBRINOGEN ACTIVITY: CPT

## 2018-06-01 PROCEDURE — 80053 COMPREHEN METABOLIC PANEL: CPT

## 2018-06-01 PROCEDURE — 0BH17EZ INSERTION OF ENDOTRACHEAL AIRWAY INTO TRACHEA, VIA NATURAL OR ARTIFICIAL OPENING: ICD-10-PCS | Performed by: SURGERY

## 2018-06-01 PROCEDURE — 99152 MOD SED SAME PHYS/QHP 5/>YRS: CPT

## 2018-06-01 PROCEDURE — 99292 CRITICAL CARE ADDL 30 MIN: CPT | Mod: 25 | Performed by: SURGERY

## 2018-06-01 PROCEDURE — 3E1F88Z IRRIGATION OF RESPIRATORY TRACT USING IRRIGATING SUBSTANCE, VIA NATURAL OR ARTIFICIAL OPENING ENDOSCOPIC: ICD-10-PCS | Performed by: SURGERY

## 2018-06-01 PROCEDURE — 31624 DX BRONCHOSCOPE/LAVAGE: CPT | Performed by: SURGERY

## 2018-06-01 PROCEDURE — 700105 HCHG RX REV CODE 258: Performed by: SURGERY

## 2018-06-01 PROCEDURE — 85347 COAGULATION TIME ACTIVATED: CPT

## 2018-06-01 PROCEDURE — 0W9930Z DRAINAGE OF RIGHT PLEURAL CAVITY WITH DRAINAGE DEVICE, PERCUTANEOUS APPROACH: ICD-10-PCS | Performed by: SURGERY

## 2018-06-01 RX ORDER — PROPOFOL 10 MG/ML
150 INJECTION, EMULSION INTRAVENOUS ONCE
Status: COMPLETED | OUTPATIENT
Start: 2018-06-01 | End: 2018-06-01

## 2018-06-01 RX ORDER — IPRATROPIUM BROMIDE AND ALBUTEROL SULFATE 2.5; .5 MG/3ML; MG/3ML
3 SOLUTION RESPIRATORY (INHALATION)
Status: DISCONTINUED | OUTPATIENT
Start: 2018-06-01 | End: 2018-06-21

## 2018-06-01 RX ORDER — SUCCINYLCHOLINE CHLORIDE 20 MG/ML
150 INJECTION INTRAMUSCULAR; INTRAVENOUS ONCE
Status: COMPLETED | OUTPATIENT
Start: 2018-06-01 | End: 2018-06-01

## 2018-06-01 RX ADMIN — FENTANYL CITRATE 100 MCG: 50 INJECTION INTRAMUSCULAR; INTRAVENOUS at 11:33

## 2018-06-01 RX ADMIN — FAMOTIDINE 20 MG: 20 TABLET ORAL at 08:22

## 2018-06-01 RX ADMIN — PROPOFOL 30 MCG/KG/MIN: 10 INJECTION, EMULSION INTRAVENOUS at 23:02

## 2018-06-01 RX ADMIN — PROPOFOL 30 MCG/KG/MIN: 10 INJECTION, EMULSION INTRAVENOUS at 17:28

## 2018-06-01 RX ADMIN — DOCUSATE SODIUM 100 MG: 100 CAPSULE ORAL at 08:19

## 2018-06-01 RX ADMIN — POLYETHYLENE GLYCOL 3350 1 PACKET: 17 POWDER, FOR SOLUTION ORAL at 08:22

## 2018-06-01 RX ADMIN — ACETAMINOPHEN 650 MG: 325 TABLET, FILM COATED ORAL at 05:59

## 2018-06-01 RX ADMIN — PROPOFOL 20 MCG/KG/MIN: 10 INJECTION, EMULSION INTRAVENOUS at 12:47

## 2018-06-01 RX ADMIN — IPRATROPIUM BROMIDE AND ALBUTEROL SULFATE 3 ML: .5; 3 SOLUTION RESPIRATORY (INHALATION) at 12:50

## 2018-06-01 RX ADMIN — OXYCODONE HYDROCHLORIDE 10 MG: 10 TABLET ORAL at 08:26

## 2018-06-01 RX ADMIN — PROPOFOL 40 MCG/KG/MIN: 10 INJECTION, EMULSION INTRAVENOUS at 13:24

## 2018-06-01 RX ADMIN — SUCCINYLCHOLINE CHLORIDE 150 MG: 20 INJECTION, SOLUTION INTRAMUSCULAR; INTRAVENOUS at 12:40

## 2018-06-01 RX ADMIN — OXYCODONE HYDROCHLORIDE 10 MG: 10 TABLET ORAL at 04:18

## 2018-06-01 RX ADMIN — MORPHINE SULFATE 4 MG: 4 INJECTION INTRAVENOUS at 06:01

## 2018-06-01 RX ADMIN — BISACODYL 10 MG: 10 SUPPOSITORY RECTAL at 19:13

## 2018-06-01 RX ADMIN — FAMOTIDINE 20 MG: 10 INJECTION, SOLUTION INTRAVENOUS at 20:22

## 2018-06-01 RX ADMIN — IPRATROPIUM BROMIDE AND ALBUTEROL SULFATE 3 ML: .5; 3 SOLUTION RESPIRATORY (INHALATION) at 14:44

## 2018-06-01 RX ADMIN — CELECOXIB 200 MG: 200 CAPSULE ORAL at 08:22

## 2018-06-01 RX ADMIN — MAGNESIUM HYDROXIDE 30 ML: 400 SUSPENSION ORAL at 08:22

## 2018-06-01 RX ADMIN — IPRATROPIUM BROMIDE AND ALBUTEROL SULFATE 3 ML: .5; 3 SOLUTION RESPIRATORY (INHALATION) at 22:21

## 2018-06-01 RX ADMIN — Medication 2500 MCG: at 11:34

## 2018-06-01 RX ADMIN — OXYCODONE HYDROCHLORIDE 10 MG: 10 TABLET ORAL at 01:07

## 2018-06-01 RX ADMIN — SODIUM CHLORIDE, POTASSIUM CHLORIDE, SODIUM LACTATE AND CALCIUM CHLORIDE: 600; 310; 30; 20 INJECTION, SOLUTION INTRAVENOUS at 10:00

## 2018-06-01 RX ADMIN — SODIUM CHLORIDE, POTASSIUM CHLORIDE, SODIUM LACTATE AND CALCIUM CHLORIDE: 600; 310; 30; 20 INJECTION, SOLUTION INTRAVENOUS at 19:29

## 2018-06-01 RX ADMIN — PROPOFOL 150 MG: 10 INJECTION, EMULSION INTRAVENOUS at 10:30

## 2018-06-01 RX ADMIN — MORPHINE SULFATE 4 MG: 4 INJECTION INTRAVENOUS at 07:10

## 2018-06-01 RX ADMIN — IPRATROPIUM BROMIDE AND ALBUTEROL SULFATE 3 ML: .5; 3 SOLUTION RESPIRATORY (INHALATION) at 18:48

## 2018-06-01 RX ADMIN — MORPHINE SULFATE 4 MG: 4 INJECTION INTRAVENOUS at 09:48

## 2018-06-01 ASSESSMENT — PAIN SCALES - GENERAL
PAINLEVEL_OUTOF10: 6
PAINLEVEL_OUTOF10: 8
PAINLEVEL_OUTOF10: 5
PAINLEVEL_OUTOF10: 7
PAINLEVEL_OUTOF10: 6
PAINLEVEL_OUTOF10: 8
PAINLEVEL_OUTOF10: 4
PAINLEVEL_OUTOF10: 8
PAINLEVEL_OUTOF10: 5
PAINLEVEL_OUTOF10: 6
PAINLEVEL_OUTOF10: 4

## 2018-06-01 NOTE — PROGRESS NOTES
Patient has been sleeping and oxygen level has been decreasing to 85. Patient states he uses cpap at home however with pneumothorax on the right side and it being untreated this may not be an option at this time. Patients effort has decreased on IS from 2750 to 1250 by the end of the day but patient states he is very tired due to his activity today and his pain has been more due to being more active. At 1800 patient got oob and sat in chair. This was with one assist and great effort on the patients part. With the change of position I am hopeful to improve patients oxygenation and possibly his IS ability.

## 2018-06-01 NOTE — RESPIRATORY CARE
Intubation Assist    Intubation assist performed yes  Positive Color Change on EZCap? yes  Difficult Intubation/Number of attempts no/1  Evidence of aspiration no  Airway Group ET Tube 8.0-Secured At  (cm): 27 (06/01/18 1030)  Serra Vent Mode: APVCMV (06/01/18 1030)     Rate (breaths/min): 18 (06/01/18 1030)  Vt Target (mL): 500 (06/01/18 1030)  FiO2: 100 (06/01/18 1030)  PEEP/CPAP: 10 (per pope) (06/01/18 1030)       Events/Summary/Plan: intubated (06/01/18 1030)

## 2018-06-01 NOTE — FLOWSHEET NOTE
06/01/18 0920   Events/Summary/Plan   Events/Summary/Plan HHFNC started. MD is aware of pt status change (O2 demands, IS value, CXR).    Education   Education Yes - Pt. / Family has been Instructed in use of Respiratory Equipment   Heated Hi Flow Nasal Cannula   Heated Hi Flow Nasal Cannula (HHFNC) Yes   FiO2 (HHFNC) 100   Flowrate (HHFNC) 60   Humidifier Temperature (HHFNC) 31   Date of Last Circuit Change 06/01/18   Circuit Change Next Due Date (Q 7 Days) 06/08/18   Chest Exam   Work Of Breathing / Effort Increased Work of Breathing;Shallow   Respiration (!) 28   Pulse (!) 109   Heart Rate (Monitored) (!) 108   Breath Sounds   Pre/Post Intervention Pre Intervention Assessment   RUL Breath Sounds Diminished   RML Breath Sounds Diminished   RLL Breath Sounds Diminished   SANIA Breath Sounds Diminished   LLL Breath Sounds Diminished   Oximetry   Continuous Oximetry Yes   Oxygen   Pulse Oximetry 96 %   O2 (LPM) 60   FiO2% 100 %   O2 Daily Delivery Respiratory  Highflow Nasal Cannula

## 2018-06-01 NOTE — PROGRESS NOTES
Patient developed nausea and started to vomit. zofran was given. Patient was also given tylenol for a temp of 101.3

## 2018-06-01 NOTE — CARE PLAN
Problem: Bowel/Gastric:  Goal: Normal bowel function is maintained or improved  Outcome: PROGRESSING AS EXPECTED  Educated patient on impact of pain medication on bowel function. Assisting patient to mobilize to promote bowel motility. Patient took all bowel protocol meds.    Problem: Pain Management  Goal: Pain level will decrease to patient's comfort goal  Outcome: PROGRESSING AS EXPECTED  Assessing pain q2hrs. Providing rest, repositioning and pillows for support as non pharmacological pain management. Providing medication per MD orders for pharmacologic pain control.

## 2018-06-02 ENCOUNTER — APPOINTMENT (OUTPATIENT)
Dept: RADIOLOGY | Facility: MEDICAL CENTER | Age: 63
DRG: 003 | End: 2018-06-02
Attending: SURGERY
Payer: COMMERCIAL

## 2018-06-02 LAB
ACTION RANGE TRIGGERED IACRT: NO
ACTION RANGE TRIGGERED IACRT: YES
ALBUMIN SERPL BCP-MCNC: 3.1 G/DL (ref 3.2–4.9)
ALBUMIN/GLOB SERPL: 1.2 G/DL
ALP SERPL-CCNC: 55 U/L (ref 30–99)
ALT SERPL-CCNC: 32 U/L (ref 2–50)
ANION GAP SERPL CALC-SCNC: 7 MMOL/L (ref 0–11.9)
AST SERPL-CCNC: 31 U/L (ref 12–45)
BASE EXCESS BLDA CALC-SCNC: 0 MMOL/L (ref -4–3)
BASE EXCESS BLDA CALC-SCNC: 0 MMOL/L (ref -4–3)
BASOPHILS # BLD AUTO: 0 % (ref 0–1.8)
BASOPHILS # BLD: 0 K/UL (ref 0–0.12)
BILIRUB SERPL-MCNC: 0.7 MG/DL (ref 0.1–1.5)
BODY TEMPERATURE: ABNORMAL DEGREES
BODY TEMPERATURE: ABNORMAL DEGREES
BUN SERPL-MCNC: 24 MG/DL (ref 8–22)
CALCIUM SERPL-MCNC: 8.3 MG/DL (ref 8.5–10.5)
CHLORIDE SERPL-SCNC: 101 MMOL/L (ref 96–112)
CO2 BLDA-SCNC: 28 MMOL/L (ref 20–33)
CO2 BLDA-SCNC: 28 MMOL/L (ref 20–33)
CO2 SERPL-SCNC: 25 MMOL/L (ref 20–33)
CREAT SERPL-MCNC: 1 MG/DL (ref 0.5–1.4)
EOSINOPHIL # BLD AUTO: 0.25 K/UL (ref 0–0.51)
EOSINOPHIL NFR BLD: 1.8 % (ref 0–6.9)
ERYTHROCYTE [DISTWIDTH] IN BLOOD BY AUTOMATED COUNT: 45.6 FL (ref 35.9–50)
GLOBULIN SER CALC-MCNC: 2.5 G/DL (ref 1.9–3.5)
GLUCOSE SERPL-MCNC: 138 MG/DL (ref 65–99)
HCO3 BLDA-SCNC: 26.1 MMOL/L (ref 17–25)
HCO3 BLDA-SCNC: 26.2 MMOL/L (ref 17–25)
HCT VFR BLD AUTO: 35.1 % (ref 42–52)
HGB BLD-MCNC: 11.9 G/DL (ref 14–18)
INST. QUALIFIED PATIENT IIQPT: YES
INST. QUALIFIED PATIENT IIQPT: YES
LYMPHOCYTES # BLD AUTO: 1.08 K/UL (ref 1–4.8)
LYMPHOCYTES NFR BLD: 7.9 % (ref 22–41)
MANUAL DIFF BLD: ABNORMAL
MCH RBC QN AUTO: 31.6 PG (ref 27–33)
MCHC RBC AUTO-ENTMCNC: 33.9 G/DL (ref 33.7–35.3)
MCV RBC AUTO: 93.4 FL (ref 81.4–97.8)
MONOCYTES # BLD AUTO: 0.12 K/UL (ref 0–0.85)
MONOCYTES NFR BLD AUTO: 0.9 % (ref 0–13.4)
MORPHOLOGY BLD-IMP: NORMAL
MYELOCYTES NFR BLD MANUAL: 0.9 %
NEUTROPHILS # BLD AUTO: 12 K/UL (ref 1.82–7.42)
NEUTROPHILS NFR BLD: 63.7 % (ref 44–72)
NEUTS BAND NFR BLD MANUAL: 23.9 % (ref 0–10)
NRBC # BLD AUTO: 0 K/UL
NRBC BLD-RTO: 0 /100 WBC
O2/TOTAL GAS SETTING VFR VENT: 100 %
O2/TOTAL GAS SETTING VFR VENT: 90 %
PCO2 BLDA: 48.2 MMHG (ref 26–37)
PCO2 BLDA: 49.6 MMHG (ref 26–37)
PCO2 TEMP ADJ BLDA: 49.8 MMHG (ref 26–37)
PCO2 TEMP ADJ BLDA: 51.8 MMHG (ref 26–37)
PH BLDA: 7.33 [PH] (ref 7.4–7.5)
PH BLDA: 7.34 [PH] (ref 7.4–7.5)
PH TEMP ADJ BLDA: 7.32 [PH] (ref 7.4–7.5)
PH TEMP ADJ BLDA: 7.33 [PH] (ref 7.4–7.5)
PLATELET # BLD AUTO: 161 K/UL (ref 164–446)
PLATELET BLD QL SMEAR: NORMAL
PMV BLD AUTO: 11.6 FL (ref 9–12.9)
PO2 BLDA: 72 MMHG (ref 64–87)
PO2 BLDA: 75 MMHG (ref 64–87)
PO2 TEMP ADJ BLDA: 76 MMHG (ref 64–87)
PO2 TEMP ADJ BLDA: 81 MMHG (ref 64–87)
POTASSIUM SERPL-SCNC: 4.5 MMOL/L (ref 3.6–5.5)
PROMYELOCYTES NFR BLD MANUAL: 0.9 %
PROT SERPL-MCNC: 5.6 G/DL (ref 6–8.2)
RBC # BLD AUTO: 3.76 M/UL (ref 4.7–6.1)
RBC BLD AUTO: NORMAL
SAO2 % BLDA: 93 % (ref 93–99)
SAO2 % BLDA: 94 % (ref 93–99)
SODIUM SERPL-SCNC: 133 MMOL/L (ref 135–145)
SPECIMEN DRAWN FROM PATIENT: ABNORMAL
SPECIMEN DRAWN FROM PATIENT: ABNORMAL
WBC # BLD AUTO: 13.7 K/UL (ref 4.8–10.8)

## 2018-06-02 PROCEDURE — 700105 HCHG RX REV CODE 258: Performed by: SURGERY

## 2018-06-02 PROCEDURE — 770022 HCHG ROOM/CARE - ICU (200)

## 2018-06-02 PROCEDURE — 03HY32Z INSERTION OF MONITORING DEVICE INTO UPPER ARTERY, PERCUTANEOUS APPROACH: ICD-10-PCS | Performed by: SURGERY

## 2018-06-02 PROCEDURE — 36600 WITHDRAWAL OF ARTERIAL BLOOD: CPT

## 2018-06-02 PROCEDURE — 94640 AIRWAY INHALATION TREATMENT: CPT

## 2018-06-02 PROCEDURE — 82803 BLOOD GASES ANY COMBINATION: CPT | Mod: 91

## 2018-06-02 PROCEDURE — 02HV33Z INSERTION OF INFUSION DEVICE INTO SUPERIOR VENA CAVA, PERCUTANEOUS APPROACH: ICD-10-PCS | Performed by: SURGERY

## 2018-06-02 PROCEDURE — 700101 HCHG RX REV CODE 250: Performed by: SURGERY

## 2018-06-02 PROCEDURE — 700112 HCHG RX REV CODE 229: Performed by: SURGERY

## 2018-06-02 PROCEDURE — A9270 NON-COVERED ITEM OR SERVICE: HCPCS | Performed by: SURGERY

## 2018-06-02 PROCEDURE — 85007 BL SMEAR W/DIFF WBC COUNT: CPT

## 2018-06-02 PROCEDURE — 700102 HCHG RX REV CODE 250 W/ 637 OVERRIDE(OP): Performed by: SURGERY

## 2018-06-02 PROCEDURE — 80053 COMPREHEN METABOLIC PANEL: CPT

## 2018-06-02 PROCEDURE — 36556 INSERT NON-TUNNEL CV CATH: CPT

## 2018-06-02 PROCEDURE — 700111 HCHG RX REV CODE 636 W/ 250 OVERRIDE (IP): Performed by: SURGERY

## 2018-06-02 PROCEDURE — 71045 X-RAY EXAM CHEST 1 VIEW: CPT

## 2018-06-02 PROCEDURE — 36620 INSERTION CATHETER ARTERY: CPT

## 2018-06-02 PROCEDURE — 94003 VENT MGMT INPAT SUBQ DAY: CPT

## 2018-06-02 PROCEDURE — C1751 CATH, INF, PER/CENT/MIDLINE: HCPCS

## 2018-06-02 PROCEDURE — 99292 CRITICAL CARE ADDL 30 MIN: CPT | Performed by: SURGERY

## 2018-06-02 PROCEDURE — 85027 COMPLETE CBC AUTOMATED: CPT

## 2018-06-02 PROCEDURE — 99291 CRITICAL CARE FIRST HOUR: CPT | Performed by: SURGERY

## 2018-06-02 RX ORDER — SODIUM CHLORIDE, SODIUM LACTATE, POTASSIUM CHLORIDE, CALCIUM CHLORIDE 600; 310; 30; 20 MG/100ML; MG/100ML; MG/100ML; MG/100ML
INJECTION, SOLUTION INTRAVENOUS CONTINUOUS
Status: DISCONTINUED | OUTPATIENT
Start: 2018-06-02 | End: 2018-06-09

## 2018-06-02 RX ORDER — PROPOFOL 10 MG/ML
50 INJECTION, EMULSION INTRAVENOUS ONCE
Status: COMPLETED | OUTPATIENT
Start: 2018-06-02 | End: 2018-06-02

## 2018-06-02 RX ORDER — SODIUM CHLORIDE, SODIUM LACTATE, POTASSIUM CHLORIDE, CALCIUM CHLORIDE 600; 310; 30; 20 MG/100ML; MG/100ML; MG/100ML; MG/100ML
500 INJECTION, SOLUTION INTRAVENOUS ONCE
Status: COMPLETED | OUTPATIENT
Start: 2018-06-03 | End: 2018-06-03

## 2018-06-02 RX ORDER — SODIUM CHLORIDE, SODIUM LACTATE, POTASSIUM CHLORIDE, CALCIUM CHLORIDE 600; 310; 30; 20 MG/100ML; MG/100ML; MG/100ML; MG/100ML
500 INJECTION, SOLUTION INTRAVENOUS ONCE
Status: COMPLETED | OUTPATIENT
Start: 2018-06-02 | End: 2018-06-03

## 2018-06-02 RX ADMIN — SODIUM CHLORIDE, POTASSIUM CHLORIDE, SODIUM LACTATE AND CALCIUM CHLORIDE 500 ML: 600; 310; 30; 20 INJECTION, SOLUTION INTRAVENOUS at 18:22

## 2018-06-02 RX ADMIN — Medication 100 MCG: at 18:24

## 2018-06-02 RX ADMIN — IPRATROPIUM BROMIDE AND ALBUTEROL SULFATE 3 ML: .5; 3 SOLUTION RESPIRATORY (INHALATION) at 06:27

## 2018-06-02 RX ADMIN — FENTANYL CITRATE 50 MCG: 50 INJECTION INTRAMUSCULAR; INTRAVENOUS at 16:25

## 2018-06-02 RX ADMIN — PROPOFOL 30 MCG/KG/MIN: 10 INJECTION, EMULSION INTRAVENOUS at 13:03

## 2018-06-02 RX ADMIN — PROPOFOL 30 MCG/KG/MIN: 10 INJECTION, EMULSION INTRAVENOUS at 16:48

## 2018-06-02 RX ADMIN — ENOXAPARIN SODIUM 30 MG: 100 INJECTION SUBCUTANEOUS at 20:40

## 2018-06-02 RX ADMIN — IPRATROPIUM BROMIDE AND ALBUTEROL SULFATE 3 ML: .5; 3 SOLUTION RESPIRATORY (INHALATION) at 18:12

## 2018-06-02 RX ADMIN — PROPOFOL 30 MCG/KG/MIN: 10 INJECTION, EMULSION INTRAVENOUS at 03:02

## 2018-06-02 RX ADMIN — BISACODYL 10 MG: 10 SUPPOSITORY RECTAL at 09:57

## 2018-06-02 RX ADMIN — FAMOTIDINE 20 MG: 10 INJECTION, SOLUTION INTRAVENOUS at 20:40

## 2018-06-02 RX ADMIN — PROPOFOL 50 MG: 10 INJECTION, EMULSION INTRAVENOUS at 19:30

## 2018-06-02 RX ADMIN — PROPOFOL 30 MCG/KG/MIN: 10 INJECTION, EMULSION INTRAVENOUS at 19:10

## 2018-06-02 RX ADMIN — FENTANYL CITRATE 100 MCG: 50 INJECTION INTRAMUSCULAR; INTRAVENOUS at 08:08

## 2018-06-02 RX ADMIN — FAMOTIDINE 20 MG: 10 INJECTION, SOLUTION INTRAVENOUS at 07:31

## 2018-06-02 RX ADMIN — IPRATROPIUM BROMIDE AND ALBUTEROL SULFATE 3 ML: .5; 3 SOLUTION RESPIRATORY (INHALATION) at 03:11

## 2018-06-02 RX ADMIN — IPRATROPIUM BROMIDE AND ALBUTEROL SULFATE 3 ML: .5; 3 SOLUTION RESPIRATORY (INHALATION) at 14:51

## 2018-06-02 RX ADMIN — PROPOFOL 40 MCG/KG/MIN: 10 INJECTION, EMULSION INTRAVENOUS at 08:08

## 2018-06-02 RX ADMIN — IPRATROPIUM BROMIDE AND ALBUTEROL SULFATE 3 ML: .5; 3 SOLUTION RESPIRATORY (INHALATION) at 22:22

## 2018-06-02 RX ADMIN — Medication 2500 MCG: at 16:45

## 2018-06-02 RX ADMIN — SODIUM CHLORIDE, POTASSIUM CHLORIDE, SODIUM LACTATE AND CALCIUM CHLORIDE: 600; 310; 30; 20 INJECTION, SOLUTION INTRAVENOUS at 17:00

## 2018-06-02 RX ADMIN — IPRATROPIUM BROMIDE AND ALBUTEROL SULFATE 3 ML: .5; 3 SOLUTION RESPIRATORY (INHALATION) at 09:38

## 2018-06-02 RX ADMIN — SODIUM CHLORIDE, POTASSIUM CHLORIDE, SODIUM LACTATE AND CALCIUM CHLORIDE: 600; 310; 30; 20 INJECTION, SOLUTION INTRAVENOUS at 05:09

## 2018-06-02 ASSESSMENT — PAIN SCALES - GENERAL
PAINLEVEL_OUTOF10: ASSUMED PAIN PRESENT

## 2018-06-02 NOTE — CARE PLAN
Problem: Pain Management  Goal: Pain level will decrease to patient's comfort goal  Fentanyl gtt at 50mcg/hour. Administered 100mcg Fent for breakthrough pain with big turn and stool cleanup.
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
## 2018-06-02 NOTE — PROGRESS NOTES
Care 0645 - 1945    Patient GCS 11. ROCA and follows commands when propofol turned off. Painful with big turns - 100mcg Fentanyl push administered for breakthrough pain Vent noncompliance with relief. SBPs dropped to 90s with Fentanyl on board, but MAPs remained > 65. MD aware.     Vent settings changed - PEEP 16 and FIO2 100%. LS are coarse with crackles. LS do not improve with suction - pink tinged secretions.     Belly is round and firm. Small, loose stool this shift. Suppository administered in attempt for more stool. NG putting out Brown stool-smelling liquid. MD aware and instructed nursing to watch.     Emily Manning R.N.

## 2018-06-02 NOTE — PROGRESS NOTES
0800 Patient displaying increased work of breathing, O2 sats low 90s on 10 L oxymask; abdominal firm/distended and patient reporting pain/discomfort in abdomen     0900 Dr Ramirez at bedside to assess patient, verbal orders to place NG to low wall suction to decompress stomach, RT at bedside to start high flow    1030 Dr Ramirez/RT at bedside to intubate    1130 Patient's O2 sats low 90s on 100% fiO2/ peep 10;  Dr Ramirez at bedside to place R side chest tube    1230 Patient desat to low 80s, RT at bedside to bag patient    1400 Dr Ramirez/RT at bedside for bronchoscopy

## 2018-06-02 NOTE — CARE PLAN
Problem: Ventilation Defect:  Goal: Ability to achieve and maintain unassisted ventilation or tolerate decreased levels of ventilator support  Adult Ventilation Update    Total Vent Days: 2    Patient Lines/Drains/Airways Status    Active Airway     Name: Placement date: Placement time: Site: Days:    Airway Group ET Tube 8.0@26 06/01/18   1030      2              CMV 20/500/14/90%    Plateau Pressure (Q Shift): 17 (06/01/18 1848)  Static Compliance (ml / cm H2O): 64.2 (06/02/18 0316)    Patient failed trials because of Barriers to Wean: No Order (06/01/18 1030)  Barriers to SBT    Length of Weaning Trial      Sputum/Suction   Cough: Productive (06/02/18 0316)  Sputum Amount: Scant (06/02/18 0400)  Sputum Color: Yellow (06/02/18 0400)  Sputum Consistency: Thick;Thin (06/02/18 0400)    Mobility  Level of Mobility: Level IV (05/31/18 2000)  Activity Performed: Unable to mobilize (06/01/18 2000)  Time Activity Tolerated: 45 min (05/31/18 2000)  Distance Per Occurrence (ft.): 6 feet (05/31/18 2000)  # of Times Distance was Traveled: 1 (05/31/18 2000)  Assistance / Tolerance: Assistance of Two or More (06/01/18 2000)  Pt Calls for Assistance: Yes (05/31/18 2000)  Staff Present for Mobilization: RN (05/31/18 2000)  Gait: Steady (06/01/18 0800)  Assistive Devices: Hand held assist (06/01/18 0800)  Reason Not Mobilized: Unstable condition (FiO2 80%, desating) (06/01/18 2000)    Events/Summary/Plan: No ventilator changes made.

## 2018-06-02 NOTE — CARE PLAN
Problem: Infection  Goal: Will remain free from infection  Outcome: PROGRESSING AS EXPECTED  Monitoring patient for signs of infection. Implementing standard precautions - hand hygiene, scrub the hub prior to access, assessing for removal of lines/drains.      Problem: Bowel/Gastric:  Goal: Normal bowel function is maintained or improved  Outcome: PROGRESSING AS EXPECTED  Assessing bowel sounds and abdominal distension. NG to low continuous suction. Suppository given to promote bowel movement.

## 2018-06-02 NOTE — OP REPORT
DATE OF OPERATION: 6/1/2018     PREOPERATIVE DIAGNOSIS: Respiratory failure, increased secretions, signs and symptoms consistent plugging       POSTOPERATIVE DIAGNOSIS: Same.     PROCEDURE PERFORMED: Fiberoptic bronchoscopy with bronchoalveolar lavage.      SURGEON: Des Ramirez MD, VARUN     ANESTHESIA: Intravenous sedation, analgesia, and pharmacologic restraint.     INDICATIONS: The patient is a 63 y.o. male with acute respiratory failure.     FINDINGS: mucous , minimal.  Airways open    SPECIMEN: none    PROCEDURE: The patient was properly identified and optimally positioned in bed. He was preoxygenated with 100% oxygen and placed on a regular ventilatory rate. Intravenous sedation, analgesia, and pharmacologic restraint was administered.    The fiberoptic bronchoscope was advance through the indwelling endotracheal tube.  The upper airways were suctioned. The airways were systematically and sequentially inspected and lavaged. The  effluent was suctioned and left and right lobar bronchi cleared.  Immediate cultures not indicated.      The patient tolerated the procedure well. There were no apparent complications.        ____________________________________     Des Ramirez MD, VARUN    MJG  DD: 6/1/2018  6:38 PM

## 2018-06-02 NOTE — CARE PLAN
Problem: Infection  Goal: Will remain free from infection  Monitor for signs of infection.     Problem: Respiratory:  Goal: Respiratory status will improve  Outcome: PROGRESSING SLOWER THAN EXPECTED  Patient's respiratory status will improve; wean fiO2 down from 100%
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
## 2018-06-02 NOTE — CARE PLAN
"Problem: Communication  Goal: The ability to communicate needs accurately and effectively will improve    Intervention: Reorient patient to environment as needed  Patient GCS 11 when propofol taken off. Nods \"yes\" and \"no.\" Patient is able to communicate to nursing staff while intubated.         "
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
## 2018-06-02 NOTE — OP REPORT
DATE OF OPERATION: 6/1/2018     PREOPERATIVE DIAGNOSIS: Acute respiratory failure    POSTOPERATIVE DIAGNOSIS: Same.     PROCEDURE PERFORMED: Rapid sequence endotracheal intubation.    SURGEON: Des Ramirez M.D.     INDICATIONS: The patient is a 63 y.o. male with respiratory failure requireing emergent intubation and mechanical ventilation.    Rapid sequence endotracheal intubation is carried out at the bedside to secure a definitve airway.     PROCEDURE: Following implied emergent consent, the patient was properly identified and optimally positioned in bed. .He was preoxygenated with 100% oxygen. A rapid sequence induction consisting of propofol and depolarizing paralytic was administered intravenously.    The vocal cords were visualized transorally with a video laryngoscope.  A cuffed #8 endotracheal tube was passed and the cuff inflated. End tidal CO2 monitoring confirmed return of carbon dioxide. The tube was secured.     The patient tolerated the procedure well. There were no apparent complications.    ____________________________________   eDs Ramirez M.D.

## 2018-06-03 ENCOUNTER — APPOINTMENT (OUTPATIENT)
Dept: RADIOLOGY | Facility: MEDICAL CENTER | Age: 63
DRG: 003 | End: 2018-06-03
Attending: SURGERY
Payer: COMMERCIAL

## 2018-06-03 LAB
ACTION RANGE TRIGGERED IACRT: NO
ACTION RANGE TRIGGERED IACRT: NO
ALBUMIN SERPL BCP-MCNC: 2.5 G/DL (ref 3.2–4.9)
ALBUMIN/GLOB SERPL: 0.9 G/DL
ALP SERPL-CCNC: 54 U/L (ref 30–99)
ALT SERPL-CCNC: 37 U/L (ref 2–50)
ANION GAP SERPL CALC-SCNC: 6 MMOL/L (ref 0–11.9)
ANION GAP SERPL CALC-SCNC: 7 MMOL/L (ref 0–11.9)
AST SERPL-CCNC: 36 U/L (ref 12–45)
BASE EXCESS BLDA CALC-SCNC: 1 MMOL/L (ref -4–3)
BASE EXCESS BLDA CALC-SCNC: 2 MMOL/L (ref -4–3)
BILIRUB SERPL-MCNC: 0.8 MG/DL (ref 0.1–1.5)
BODY TEMPERATURE: ABNORMAL DEGREES
BODY TEMPERATURE: ABNORMAL DEGREES
BUN SERPL-MCNC: 34 MG/DL (ref 8–22)
BUN SERPL-MCNC: 37 MG/DL (ref 8–22)
CALCIUM SERPL-MCNC: 7.8 MG/DL (ref 8.5–10.5)
CALCIUM SERPL-MCNC: 8 MG/DL (ref 8.5–10.5)
CHLORIDE SERPL-SCNC: 104 MMOL/L (ref 96–112)
CHLORIDE SERPL-SCNC: 104 MMOL/L (ref 96–112)
CO2 BLDA-SCNC: 26 MMOL/L (ref 20–33)
CO2 BLDA-SCNC: 28 MMOL/L (ref 20–33)
CO2 SERPL-SCNC: 25 MMOL/L (ref 20–33)
CO2 SERPL-SCNC: 26 MMOL/L (ref 20–33)
CREAT SERPL-MCNC: 0.99 MG/DL (ref 0.5–1.4)
CREAT SERPL-MCNC: 1.36 MG/DL (ref 0.5–1.4)
GLOBULIN SER CALC-MCNC: 2.7 G/DL (ref 1.9–3.5)
GLUCOSE SERPL-MCNC: 120 MG/DL (ref 65–99)
GLUCOSE SERPL-MCNC: 134 MG/DL (ref 65–99)
HCO3 BLDA-SCNC: 25.3 MMOL/L (ref 17–25)
HCO3 BLDA-SCNC: 26.8 MMOL/L (ref 17–25)
INST. QUALIFIED PATIENT IIQPT: YES
INST. QUALIFIED PATIENT IIQPT: YES
O2/TOTAL GAS SETTING VFR VENT: 5 %
O2/TOTAL GAS SETTING VFR VENT: 50 %
PCO2 BLDA: 39.8 MMHG (ref 26–37)
PCO2 BLDA: 41.7 MMHG (ref 26–37)
PCO2 TEMP ADJ BLDA: 39.8 MMHG (ref 26–37)
PCO2 TEMP ADJ BLDA: 41.5 MMHG (ref 26–37)
PH BLDA: 7.41 [PH] (ref 7.4–7.5)
PH BLDA: 7.42 [PH] (ref 7.4–7.5)
PH TEMP ADJ BLDA: 7.41 [PH] (ref 7.4–7.5)
PH TEMP ADJ BLDA: 7.42 [PH] (ref 7.4–7.5)
PO2 BLDA: 226 MMHG (ref 64–87)
PO2 BLDA: 97 MMHG (ref 64–87)
PO2 TEMP ADJ BLDA: 226 MMHG (ref 64–87)
PO2 TEMP ADJ BLDA: 97 MMHG (ref 64–87)
POTASSIUM SERPL-SCNC: 4.1 MMOL/L (ref 3.6–5.5)
POTASSIUM SERPL-SCNC: 4.3 MMOL/L (ref 3.6–5.5)
PROT SERPL-MCNC: 5.2 G/DL (ref 6–8.2)
SAO2 % BLDA: 100 % (ref 93–99)
SAO2 % BLDA: 98 % (ref 93–99)
SODIUM SERPL-SCNC: 136 MMOL/L (ref 135–145)
SODIUM SERPL-SCNC: 136 MMOL/L (ref 135–145)
SPECIMEN DRAWN FROM PATIENT: ABNORMAL
SPECIMEN DRAWN FROM PATIENT: ABNORMAL
TRIGL SERPL-MCNC: 251 MG/DL (ref 0–149)

## 2018-06-03 PROCEDURE — 700105 HCHG RX REV CODE 258: Performed by: SURGERY

## 2018-06-03 PROCEDURE — 99292 CRITICAL CARE ADDL 30 MIN: CPT | Performed by: SURGERY

## 2018-06-03 PROCEDURE — 37799 UNLISTED PX VASCULAR SURGERY: CPT

## 2018-06-03 PROCEDURE — 80048 BASIC METABOLIC PNL TOTAL CA: CPT

## 2018-06-03 PROCEDURE — 82803 BLOOD GASES ANY COMBINATION: CPT

## 2018-06-03 PROCEDURE — 94640 AIRWAY INHALATION TREATMENT: CPT

## 2018-06-03 PROCEDURE — 700111 HCHG RX REV CODE 636 W/ 250 OVERRIDE (IP): Performed by: SURGERY

## 2018-06-03 PROCEDURE — 36620 INSERTION CATHETER ARTERY: CPT

## 2018-06-03 PROCEDURE — C1751 CATH, INF, PER/CENT/MIDLINE: HCPCS

## 2018-06-03 PROCEDURE — 80053 COMPREHEN METABOLIC PANEL: CPT

## 2018-06-03 PROCEDURE — 84478 ASSAY OF TRIGLYCERIDES: CPT

## 2018-06-03 PROCEDURE — 700101 HCHG RX REV CODE 250: Performed by: SURGERY

## 2018-06-03 PROCEDURE — 71045 X-RAY EXAM CHEST 1 VIEW: CPT

## 2018-06-03 PROCEDURE — 770022 HCHG ROOM/CARE - ICU (200)

## 2018-06-03 PROCEDURE — 36556 INSERT NON-TUNNEL CV CATH: CPT

## 2018-06-03 PROCEDURE — 94003 VENT MGMT INPAT SUBQ DAY: CPT

## 2018-06-03 PROCEDURE — 99291 CRITICAL CARE FIRST HOUR: CPT | Performed by: SURGERY

## 2018-06-03 RX ORDER — SODIUM CHLORIDE, SODIUM LACTATE, POTASSIUM CHLORIDE, CALCIUM CHLORIDE 600; 310; 30; 20 MG/100ML; MG/100ML; MG/100ML; MG/100ML
1000 INJECTION, SOLUTION INTRAVENOUS ONCE
Status: COMPLETED | OUTPATIENT
Start: 2018-06-03 | End: 2018-06-03

## 2018-06-03 RX ORDER — METOCLOPRAMIDE HYDROCHLORIDE 5 MG/ML
20 INJECTION INTRAMUSCULAR; INTRAVENOUS 3 TIMES DAILY
Status: DISCONTINUED | OUTPATIENT
Start: 2018-06-03 | End: 2018-06-03

## 2018-06-03 RX ORDER — METOCLOPRAMIDE HYDROCHLORIDE 5 MG/ML
10 INJECTION INTRAMUSCULAR; INTRAVENOUS 3 TIMES DAILY
Status: DISCONTINUED | OUTPATIENT
Start: 2018-06-03 | End: 2018-06-09

## 2018-06-03 RX ADMIN — IPRATROPIUM BROMIDE AND ALBUTEROL SULFATE 3 ML: .5; 3 SOLUTION RESPIRATORY (INHALATION) at 14:09

## 2018-06-03 RX ADMIN — IPRATROPIUM BROMIDE AND ALBUTEROL SULFATE 3 ML: .5; 3 SOLUTION RESPIRATORY (INHALATION) at 09:29

## 2018-06-03 RX ADMIN — Medication 150 MCG/HR: at 09:51

## 2018-06-03 RX ADMIN — SODIUM CHLORIDE, POTASSIUM CHLORIDE, SODIUM LACTATE AND CALCIUM CHLORIDE 1000 ML: 600; 310; 30; 20 INJECTION, SOLUTION INTRAVENOUS at 01:30

## 2018-06-03 RX ADMIN — IPRATROPIUM BROMIDE AND ALBUTEROL SULFATE 3 ML: .5; 3 SOLUTION RESPIRATORY (INHALATION) at 06:22

## 2018-06-03 RX ADMIN — FAMOTIDINE 20 MG: 10 INJECTION, SOLUTION INTRAVENOUS at 20:35

## 2018-06-03 RX ADMIN — FAMOTIDINE 20 MG: 10 INJECTION, SOLUTION INTRAVENOUS at 07:40

## 2018-06-03 RX ADMIN — PROPOFOL 30 MCG/KG/MIN: 10 INJECTION, EMULSION INTRAVENOUS at 07:40

## 2018-06-03 RX ADMIN — Medication 150 MCG/HR: at 23:28

## 2018-06-03 RX ADMIN — SODIUM CHLORIDE, POTASSIUM CHLORIDE, SODIUM LACTATE AND CALCIUM CHLORIDE: 600; 310; 30; 20 INJECTION, SOLUTION INTRAVENOUS at 04:26

## 2018-06-03 RX ADMIN — PROPOFOL 15 MCG/KG/MIN: 10 INJECTION, EMULSION INTRAVENOUS at 17:57

## 2018-06-03 RX ADMIN — PROPOFOL 30 MCG/KG/MIN: 10 INJECTION, EMULSION INTRAVENOUS at 13:25

## 2018-06-03 RX ADMIN — NOREPINEPHRINE BITARTRATE 5 MCG/MIN: 1 INJECTION INTRAVENOUS at 01:50

## 2018-06-03 RX ADMIN — METOCLOPRAMIDE 10 MG: 5 INJECTION, SOLUTION INTRAMUSCULAR; INTRAVENOUS at 18:15

## 2018-06-03 RX ADMIN — PROPOFOL 30 MCG/KG/MIN: 10 INJECTION, EMULSION INTRAVENOUS at 02:41

## 2018-06-03 RX ADMIN — ENOXAPARIN SODIUM 30 MG: 100 INJECTION SUBCUTANEOUS at 07:40

## 2018-06-03 RX ADMIN — ENOXAPARIN SODIUM 30 MG: 100 INJECTION SUBCUTANEOUS at 20:35

## 2018-06-03 RX ADMIN — IPRATROPIUM BROMIDE AND ALBUTEROL SULFATE 3 ML: .5; 3 SOLUTION RESPIRATORY (INHALATION) at 22:03

## 2018-06-03 RX ADMIN — IPRATROPIUM BROMIDE AND ALBUTEROL SULFATE 3 ML: .5; 3 SOLUTION RESPIRATORY (INHALATION) at 02:22

## 2018-06-03 RX ADMIN — SODIUM CHLORIDE, POTASSIUM CHLORIDE, SODIUM LACTATE AND CALCIUM CHLORIDE 500 ML: 600; 310; 30; 20 INJECTION, SOLUTION INTRAVENOUS at 00:00

## 2018-06-03 RX ADMIN — IPRATROPIUM BROMIDE AND ALBUTEROL SULFATE 3 ML: .5; 3 SOLUTION RESPIRATORY (INHALATION) at 18:09

## 2018-06-03 ASSESSMENT — PAIN SCALES - GENERAL
PAINLEVEL_OUTOF10: ASSUMED PAIN PRESENT

## 2018-06-03 ASSESSMENT — LIFESTYLE VARIABLES
AVERAGE NUMBER OF DAYS PER WEEK YOU HAVE A DRINK CONTAINING ALCOHOL: 1
TOTAL SCORE: 0
TOTAL SCORE: 0
HOW MANY TIMES IN THE PAST YEAR HAVE YOU HAD 5 OR MORE DRINKS IN A DAY: 0
ALCOHOL_USE: YES
CONSUMPTION TOTAL: NEGATIVE
EVER FELT BAD OR GUILTY ABOUT YOUR DRINKING: NO
TOTAL SCORE: 0
HAVE YOU EVER FELT YOU SHOULD CUT DOWN ON YOUR DRINKING: NO
ON A TYPICAL DAY WHEN YOU DRINK ALCOHOL HOW MANY DRINKS DO YOU HAVE: 1
HAVE PEOPLE ANNOYED YOU BY CRITICIZING YOUR DRINKING: NO
EVER HAD A DRINK FIRST THING IN THE MORNING TO STEADY YOUR NERVES TO GET RID OF A HANGOVER: NO

## 2018-06-03 ASSESSMENT — PATIENT HEALTH QUESTIONNAIRE - PHQ9
1. LITTLE INTEREST OR PLEASURE IN DOING THINGS: NOT AT ALL
SUM OF ALL RESPONSES TO PHQ9 QUESTIONS 1 AND 2: 0
2. FEELING DOWN, DEPRESSED, IRRITABLE, OR HOPELESS: NOT AT ALL
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
## 2018-06-03 NOTE — CARE PLAN
Problem: Pain Management  Goal: Pain level will decrease to patient's comfort goal  Assessing pain q2hrs. Patient receiving continuous fentanyl drip at 100mcg/hr. Allowing patient to rest and assisting with repositioning for comfort.     Problem: Respiratory:  Goal: Respiratory status will improve  Outcome: PROGRESSING SLOWER THAN EXPECTED  Patient desated to 80% spo2 during turn and sheet change to clean up stool. Requiring APRV vent setting with FiO2 of 100%.
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
## 2018-06-03 NOTE — PROCEDURES
Procedure Report    Procedure: Central line placement    Surgeon: Stefany Terry MD    Diagnosis: Trauma, Need for access    Indications: Need for access    Procedure in detail: Full barrier precautions were used for the procedure including cap, sterile gown, sterile gloves, and sterile full body drape.The patient's right superior anterior chest wall  was prepped and draped in the standard sterile fashion. The ultrasound was not used to locate the vascular structures and the right subclavian vein was accessed with a needle. The modified seldinger technique was used to place a 7Fr 20cm triple lumen catheter. The ultrasound was not used to verify placemen of the wire in the correct vessel. The catheter was secured to the skin with silk suture.   Sterile dressings were applied, including a biopatch. The patient tolerated the procedure well.  A chest x-ray was ordered for verification.     Stefany Terry MD

## 2018-06-03 NOTE — CARE PLAN
Problem: Ventilation Defect:  Goal: Ability to achieve and maintain unassisted ventilation or tolerate decreased levels of ventilator support    Intervention: Support and monitor invasive and noninvasive mechanical ventilation  Adult Ventilation Update    Total Vent Days: 3    APRV  Phigh: 28, Plow: 0  Thigh: 4 secs, Tlow: 0.6secs  70% fio2    Patient Lines/Drains/Airways Status    Active Airway     Name: Placement date: Placement time: Site: Days:    Airway Group ET Tube 8.0@26 06/01/18   1030      2              Plateau Pressure (Q Shift): 24 (06/02/18 0628)  Static Compliance (ml / cm H2O): 48 (06/03/18 1412)    Patient failed trials because of Barriers to Wean: Other (Comments) (pt on aprv ) (06/03/18 0624)      Events/Summary/Plan: fio2 titrated to 70%

## 2018-06-03 NOTE — PROCEDURES
Procedure Report    Procedure: Arterial line placement    Surgeon: Stefany Terry MD    Diagnosis: Trauma, Hypotension    Indications: Hypotension, infusion of vasopressors    Procedure in detail: The patient's left wrist was sterilely prepped and draped. The ultrasound was used to locate the vascular structures and a left radial arterial line was placed using the modified seldinger technique.  The catheter was secured to the skin with silk suture and a pressure line was attached to the catheter with good waveform on the monitor.  Sterile dressings were applied. The patient tolerated the procedure well.      Stefany Terry MD  [facility]  [phone]

## 2018-06-04 ENCOUNTER — APPOINTMENT (OUTPATIENT)
Dept: RADIOLOGY | Facility: MEDICAL CENTER | Age: 63
DRG: 003 | End: 2018-06-04
Attending: SURGERY
Payer: COMMERCIAL

## 2018-06-04 ENCOUNTER — HOSPITAL ENCOUNTER (OUTPATIENT)
Dept: RADIOLOGY | Facility: MEDICAL CENTER | Age: 63
End: 2018-06-04
Attending: SURGERY

## 2018-06-04 PROBLEM — K76.6 PORTAL HYPERTENSION (HCC): Status: ACTIVE | Noted: 2018-06-04

## 2018-06-04 PROBLEM — K56.7 ILEUS (HCC): Status: ACTIVE | Noted: 2018-06-04

## 2018-06-04 PROBLEM — K74.60 CIRRHOSIS (HCC): Status: ACTIVE | Noted: 2018-06-04

## 2018-06-04 LAB
ACTION RANGE TRIGGERED IACRT: YES
ACTION RANGE TRIGGERED IACRT: YES
ALBUMIN SERPL BCP-MCNC: 2.5 G/DL (ref 3.2–4.9)
ALBUMIN/GLOB SERPL: 0.9 G/DL
ALP SERPL-CCNC: 57 U/L (ref 30–99)
ALT SERPL-CCNC: 42 U/L (ref 2–50)
ANION GAP SERPL CALC-SCNC: 7 MMOL/L (ref 0–11.9)
ANISOCYTOSIS BLD QL SMEAR: ABNORMAL
AST SERPL-CCNC: 36 U/L (ref 12–45)
BASE EXCESS BLDA CALC-SCNC: 0 MMOL/L (ref -4–3)
BASE EXCESS BLDA CALC-SCNC: 1 MMOL/L (ref -4–3)
BASOPHILS # BLD AUTO: 0 % (ref 0–1.8)
BASOPHILS # BLD: 0 K/UL (ref 0–0.12)
BILIRUB SERPL-MCNC: 1 MG/DL (ref 0.1–1.5)
BODY TEMPERATURE: ABNORMAL DEGREES
BODY TEMPERATURE: ABNORMAL DEGREES
BUN SERPL-MCNC: 29 MG/DL (ref 8–22)
CALCIUM SERPL-MCNC: 8 MG/DL (ref 8.5–10.5)
CHLORIDE SERPL-SCNC: 105 MMOL/L (ref 96–112)
CO2 BLDA-SCNC: 28 MMOL/L (ref 20–33)
CO2 BLDA-SCNC: 29 MMOL/L (ref 20–33)
CO2 SERPL-SCNC: 27 MMOL/L (ref 20–33)
CREAT SERPL-MCNC: 0.86 MG/DL (ref 0.5–1.4)
EOSINOPHIL # BLD AUTO: 0.54 K/UL (ref 0–0.51)
EOSINOPHIL NFR BLD: 4.8 % (ref 0–6.9)
ERYTHROCYTE [DISTWIDTH] IN BLOOD BY AUTOMATED COUNT: 46.5 FL (ref 35.9–50)
GLOBULIN SER CALC-MCNC: 2.9 G/DL (ref 1.9–3.5)
GLUCOSE SERPL-MCNC: 98 MG/DL (ref 65–99)
HCO3 BLDA-SCNC: 26.6 MMOL/L (ref 17–25)
HCO3 BLDA-SCNC: 27.8 MMOL/L (ref 17–25)
HCT VFR BLD AUTO: 29.7 % (ref 42–52)
HGB BLD-MCNC: 9.8 G/DL (ref 14–18)
INST. QUALIFIED PATIENT IIQPT: YES
INST. QUALIFIED PATIENT IIQPT: YES
LG PLATELETS BLD QL SMEAR: NORMAL
LV EJECT FRACT  99904: 65
LV EJECT FRACT MOD 2C 99903: 60.08
LV EJECT FRACT MOD 4C 99902: 69.15
LV EJECT FRACT MOD BP 99901: 65.57
LYMPHOCYTES # BLD AUTO: 1.5 K/UL (ref 1–4.8)
LYMPHOCYTES NFR BLD: 13.3 % (ref 22–41)
MAGNESIUM SERPL-MCNC: 2.5 MG/DL (ref 1.5–2.5)
MANUAL DIFF BLD: ABNORMAL
MCH RBC QN AUTO: 31.7 PG (ref 27–33)
MCHC RBC AUTO-ENTMCNC: 33 G/DL (ref 33.7–35.3)
MCV RBC AUTO: 96.1 FL (ref 81.4–97.8)
METAMYELOCYTES NFR BLD MANUAL: 2.9 %
MONOCYTES # BLD AUTO: 0.43 K/UL (ref 0–0.85)
MONOCYTES NFR BLD AUTO: 3.8 % (ref 0–13.4)
MORPHOLOGY BLD-IMP: NORMAL
NEUTROPHILS # BLD AUTO: 8.28 K/UL (ref 1.82–7.42)
NEUTROPHILS NFR BLD: 60 % (ref 44–72)
NEUTS BAND NFR BLD MANUAL: 13.3 % (ref 0–10)
NRBC # BLD AUTO: 0 K/UL
NRBC BLD-RTO: 0 /100 WBC
O2/TOTAL GAS SETTING VFR VENT: 100 %
O2/TOTAL GAS SETTING VFR VENT: 65 %
PCO2 BLDA: 51.8 MMHG (ref 26–37)
PCO2 BLDA: 52.5 MMHG (ref 26–37)
PCO2 TEMP ADJ BLDA: 53.6 MMHG (ref 26–37)
PCO2 TEMP ADJ BLDA: 53.9 MMHG (ref 26–37)
PH BLDA: 7.31 [PH] (ref 7.4–7.5)
PH BLDA: 7.34 [PH] (ref 7.4–7.5)
PH TEMP ADJ BLDA: 7.3 [PH] (ref 7.4–7.5)
PH TEMP ADJ BLDA: 7.33 [PH] (ref 7.4–7.5)
PHOSPHATE SERPL-MCNC: 3.9 MG/DL (ref 2.5–4.5)
PLATELET # BLD AUTO: 180 K/UL (ref 164–446)
PLATELET BLD QL SMEAR: NORMAL
PMV BLD AUTO: 12.1 FL (ref 9–12.9)
PO2 BLDA: 205 MMHG (ref 64–87)
PO2 BLDA: 75 MMHG (ref 64–87)
PO2 TEMP ADJ BLDA: 209 MMHG (ref 64–87)
PO2 TEMP ADJ BLDA: 79 MMHG (ref 64–87)
POTASSIUM SERPL-SCNC: 4.1 MMOL/L (ref 3.6–5.5)
PROMYELOCYTES NFR BLD MANUAL: 1.9 %
PROT SERPL-MCNC: 5.4 G/DL (ref 6–8.2)
RBC # BLD AUTO: 3.09 M/UL (ref 4.7–6.1)
RBC BLD AUTO: PRESENT
SAO2 % BLDA: 100 % (ref 93–99)
SAO2 % BLDA: 93 % (ref 93–99)
SODIUM SERPL-SCNC: 139 MMOL/L (ref 135–145)
SPECIMEN DRAWN FROM PATIENT: ABNORMAL
SPECIMEN DRAWN FROM PATIENT: ABNORMAL
WBC # BLD AUTO: 11.3 K/UL (ref 4.8–10.8)

## 2018-06-04 PROCEDURE — 71045 X-RAY EXAM CHEST 1 VIEW: CPT

## 2018-06-04 PROCEDURE — 700105 HCHG RX REV CODE 258: Performed by: SURGERY

## 2018-06-04 PROCEDURE — 85007 BL SMEAR W/DIFF WBC COUNT: CPT

## 2018-06-04 PROCEDURE — 700111 HCHG RX REV CODE 636 W/ 250 OVERRIDE (IP): Performed by: SURGERY

## 2018-06-04 PROCEDURE — 94640 AIRWAY INHALATION TREATMENT: CPT

## 2018-06-04 PROCEDURE — 83735 ASSAY OF MAGNESIUM: CPT

## 2018-06-04 PROCEDURE — 85027 COMPLETE CBC AUTOMATED: CPT

## 2018-06-04 PROCEDURE — 94003 VENT MGMT INPAT SUBQ DAY: CPT

## 2018-06-04 PROCEDURE — 99292 CRITICAL CARE ADDL 30 MIN: CPT | Performed by: SURGERY

## 2018-06-04 PROCEDURE — 770022 HCHG ROOM/CARE - ICU (200)

## 2018-06-04 PROCEDURE — 84100 ASSAY OF PHOSPHORUS: CPT

## 2018-06-04 PROCEDURE — 700117 HCHG RX CONTRAST REV CODE 255: Performed by: SURGERY

## 2018-06-04 PROCEDURE — 74250 X-RAY XM SM INT 1CNTRST STD: CPT

## 2018-06-04 PROCEDURE — 76700 US EXAM ABDOM COMPLETE: CPT

## 2018-06-04 PROCEDURE — 93306 TTE W/DOPPLER COMPLETE: CPT

## 2018-06-04 PROCEDURE — 82803 BLOOD GASES ANY COMBINATION: CPT

## 2018-06-04 PROCEDURE — 80053 COMPREHEN METABOLIC PANEL: CPT

## 2018-06-04 PROCEDURE — 99291 CRITICAL CARE FIRST HOUR: CPT | Performed by: SURGERY

## 2018-06-04 PROCEDURE — 93306 TTE W/DOPPLER COMPLETE: CPT | Mod: 26 | Performed by: INTERNAL MEDICINE

## 2018-06-04 PROCEDURE — 37799 UNLISTED PX VASCULAR SURGERY: CPT

## 2018-06-04 PROCEDURE — 700101 HCHG RX REV CODE 250: Performed by: SURGERY

## 2018-06-04 RX ADMIN — IPRATROPIUM BROMIDE AND ALBUTEROL SULFATE 3 ML: .5; 3 SOLUTION RESPIRATORY (INHALATION) at 14:49

## 2018-06-04 RX ADMIN — PROPOFOL 50 MCG/KG/MIN: 10 INJECTION, EMULSION INTRAVENOUS at 21:59

## 2018-06-04 RX ADMIN — PROPOFOL 50 MCG/KG/MIN: 10 INJECTION, EMULSION INTRAVENOUS at 18:11

## 2018-06-04 RX ADMIN — PROPOFOL 80 MCG/KG/MIN: 10 INJECTION, EMULSION INTRAVENOUS at 04:40

## 2018-06-04 RX ADMIN — IPRATROPIUM BROMIDE AND ALBUTEROL SULFATE 3 ML: .5; 3 SOLUTION RESPIRATORY (INHALATION) at 22:15

## 2018-06-04 RX ADMIN — PROPOFOL 40 MCG/KG/MIN: 10 INJECTION, EMULSION INTRAVENOUS at 07:44

## 2018-06-04 RX ADMIN — PROPOFOL 50 MCG/KG/MIN: 10 INJECTION, EMULSION INTRAVENOUS at 10:24

## 2018-06-04 RX ADMIN — IOHEXOL 200 ML: 350 INJECTION, SOLUTION INTRAVENOUS at 12:00

## 2018-06-04 RX ADMIN — Medication 150 MCG/HR: at 12:38

## 2018-06-04 RX ADMIN — IPRATROPIUM BROMIDE AND ALBUTEROL SULFATE 3 ML: .5; 3 SOLUTION RESPIRATORY (INHALATION) at 02:38

## 2018-06-04 RX ADMIN — SODIUM CHLORIDE, POTASSIUM CHLORIDE, SODIUM LACTATE AND CALCIUM CHLORIDE: 600; 310; 30; 20 INJECTION, SOLUTION INTRAVENOUS at 15:40

## 2018-06-04 RX ADMIN — ENOXAPARIN SODIUM 30 MG: 100 INJECTION SUBCUTANEOUS at 07:51

## 2018-06-04 RX ADMIN — FAMOTIDINE 20 MG: 10 INJECTION, SOLUTION INTRAVENOUS at 07:48

## 2018-06-04 RX ADMIN — METOCLOPRAMIDE 10 MG: 5 INJECTION, SOLUTION INTRAMUSCULAR; INTRAVENOUS at 20:25

## 2018-06-04 RX ADMIN — IPRATROPIUM BROMIDE AND ALBUTEROL SULFATE 3 ML: .5; 3 SOLUTION RESPIRATORY (INHALATION) at 18:59

## 2018-06-04 RX ADMIN — METOCLOPRAMIDE 10 MG: 5 INJECTION, SOLUTION INTRAMUSCULAR; INTRAVENOUS at 07:47

## 2018-06-04 RX ADMIN — SODIUM CHLORIDE, POTASSIUM CHLORIDE, SODIUM LACTATE AND CALCIUM CHLORIDE: 600; 310; 30; 20 INJECTION, SOLUTION INTRAVENOUS at 21:00

## 2018-06-04 RX ADMIN — ENOXAPARIN SODIUM 30 MG: 100 INJECTION SUBCUTANEOUS at 20:25

## 2018-06-04 RX ADMIN — PROPOFOL 30 MCG/KG/MIN: 10 INJECTION, EMULSION INTRAVENOUS at 01:06

## 2018-06-04 RX ADMIN — IPRATROPIUM BROMIDE AND ALBUTEROL SULFATE 3 ML: .5; 3 SOLUTION RESPIRATORY (INHALATION) at 10:57

## 2018-06-04 RX ADMIN — PROPOFOL 50 MCG/KG/MIN: 10 INJECTION, EMULSION INTRAVENOUS at 15:41

## 2018-06-04 RX ADMIN — METOCLOPRAMIDE 10 MG: 5 INJECTION, SOLUTION INTRAMUSCULAR; INTRAVENOUS at 15:13

## 2018-06-04 RX ADMIN — FAMOTIDINE 20 MG: 10 INJECTION, SOLUTION INTRAVENOUS at 20:25

## 2018-06-04 RX ADMIN — PROPOFOL 50 MCG/KG/MIN: 10 INJECTION, EMULSION INTRAVENOUS at 12:34

## 2018-06-04 RX ADMIN — PROPOFOL 30 MCG/KG/MIN: 10 INJECTION, EMULSION INTRAVENOUS at 02:19

## 2018-06-04 RX ADMIN — IPRATROPIUM BROMIDE AND ALBUTEROL SULFATE 3 ML: .5; 3 SOLUTION RESPIRATORY (INHALATION) at 07:08
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
## 2018-06-04 NOTE — PROGRESS NOTES
Advanced NG tube 1 inch per Dr. Ramirez. He would like tube to sump better.     Emily Manning R.N.
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
## 2018-06-04 NOTE — PROGRESS NOTES
Care 0287 - 1915    Patient Sedated on 50mcg/kg/min of Prop and 150mcg/hour of Fent. Unable to do sedation vacation today d/t abdominal studies - See images. SR 60s - 70s. SBPs 90s - 130s. CVPs 10 - 19. Sating 92 - 100% on APRV at 50 - 70% O2. LS are coarse/clear on R side. Bilateral lower lobes are diminished. Desats with turning and laying flat. RT aware.     Emily Manning R.N.

## 2018-06-04 NOTE — PROGRESS NOTES
Chest xray shows maintained alignment of right clavicle fracture  No surgery required at this time  Sling for comfort  WBAT

## 2018-06-04 NOTE — CARE PLAN
Problem: Ventilation Defect:  Goal: Ability to achieve and maintain unassisted ventilation or tolerate decreased levels of ventilator support  Adult Ventilation Update    Total Vent Days: 3      Patient Lines/Drains/Airways Status    Active Airway     Name: Placement date: Placement time: Site: Days:    Airway Group ET Tube 8.0 @ 26 06/01/18   1030      2              APRV P High 28  P low 0 T high 4 sec T low .6   No weaning due to APRV  Pt titrated fio2 up and down due to frequent desaturations

## 2018-06-04 NOTE — CARE PLAN
Problem: Nutritional:  Goal: Achieve adequate nutritional intake  Start diet or nutrition support as appropriate  Outcome: NOT MET

## 2018-06-04 NOTE — CARE PLAN
Problem: Ventilation Defect:  Goal: Ability to achieve and maintain unassisted ventilation or tolerate decreased levels of ventilator support    Intervention: Support and monitor invasive and noninvasive mechanical ventilation  Adult Ventilation Update    Total Vent Days: 4    APRV    Phigh: 28, Plow: 0  Thigh: 4, Tlow: 0.6 secs  Fio2: 50%    Patient Lines/Drains/Airways Status    Active Airway     Name: Placement date: Placement time: Site: Days:    Airway Group ET Tube 8.0@26 06/01/18   1030      3              Plateau Pressure (Q Shift): 26 (06/03/18 1811)  Static Compliance (ml / cm H2O): 54.6 (06/04/18 1450)    Patient failed trials because of Barriers to Wean: Other (Comments) (pt on aprv) (06/04/18 0710      Events/Summary/Plan: fio2 to 50%, no other vent changes made this shift.
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
## 2018-06-05 ENCOUNTER — APPOINTMENT (OUTPATIENT)
Dept: RADIOLOGY | Facility: MEDICAL CENTER | Age: 63
DRG: 003 | End: 2018-06-05
Attending: SURGERY
Payer: COMMERCIAL

## 2018-06-05 PROBLEM — Z78.9 NO CONTRAINDICATION TO DEEP VEIN THROMBOSIS (DVT) PROPHYLAXIS: Status: ACTIVE | Noted: 2018-05-30

## 2018-06-05 LAB
ACTION RANGE TRIGGERED IACRT: NO
ALBUMIN SERPL BCP-MCNC: 2.3 G/DL (ref 3.2–4.9)
ALBUMIN/GLOB SERPL: 0.8 G/DL
ALP SERPL-CCNC: 61 U/L (ref 30–99)
ALT SERPL-CCNC: 52 U/L (ref 2–50)
ANION GAP SERPL CALC-SCNC: 10 MMOL/L (ref 0–11.9)
ANISOCYTOSIS BLD QL SMEAR: ABNORMAL
AST SERPL-CCNC: 55 U/L (ref 12–45)
BASE EXCESS BLDA CALC-SCNC: 1 MMOL/L (ref -4–3)
BASOPHILS # BLD AUTO: 0 % (ref 0–1.8)
BASOPHILS # BLD: 0 K/UL (ref 0–0.12)
BILIRUB SERPL-MCNC: 1.7 MG/DL (ref 0.1–1.5)
BODY TEMPERATURE: ABNORMAL DEGREES
BUN SERPL-MCNC: 27 MG/DL (ref 8–22)
CALCIUM SERPL-MCNC: 8.2 MG/DL (ref 8.5–10.5)
CHLORIDE SERPL-SCNC: 105 MMOL/L (ref 96–112)
CO2 BLDA-SCNC: 26 MMOL/L (ref 20–33)
CO2 SERPL-SCNC: 24 MMOL/L (ref 20–33)
CREAT SERPL-MCNC: 0.96 MG/DL (ref 0.5–1.4)
EOSINOPHIL # BLD AUTO: 0.2 K/UL (ref 0–0.51)
EOSINOPHIL NFR BLD: 1.7 % (ref 0–6.9)
ERYTHROCYTE [DISTWIDTH] IN BLOOD BY AUTOMATED COUNT: 46.8 FL (ref 35.9–50)
GLOBULIN SER CALC-MCNC: 3 G/DL (ref 1.9–3.5)
GLUCOSE SERPL-MCNC: 87 MG/DL (ref 65–99)
HCO3 BLDA-SCNC: 25.2 MMOL/L (ref 17–25)
HCT VFR BLD AUTO: 27 % (ref 42–52)
HGB BLD-MCNC: 8.9 G/DL (ref 14–18)
INST. QUALIFIED PATIENT IIQPT: YES
LYMPHOCYTES # BLD AUTO: 1.46 K/UL (ref 1–4.8)
LYMPHOCYTES NFR BLD: 12.4 % (ref 22–41)
MACROCYTES BLD QL SMEAR: ABNORMAL
MANUAL DIFF BLD: NORMAL
MCH RBC QN AUTO: 31.6 PG (ref 27–33)
MCHC RBC AUTO-ENTMCNC: 33 G/DL (ref 33.7–35.3)
MCV RBC AUTO: 95.7 FL (ref 81.4–97.8)
MICROCYTES BLD QL SMEAR: ABNORMAL
MONOCYTES # BLD AUTO: 0.73 K/UL (ref 0–0.85)
MONOCYTES NFR BLD AUTO: 6.2 % (ref 0–13.4)
MORPHOLOGY BLD-IMP: NORMAL
MYELOCYTES NFR BLD MANUAL: 0.9 %
NEUTROPHILS # BLD AUTO: 9.19 K/UL (ref 1.82–7.42)
NEUTROPHILS NFR BLD: 70.8 % (ref 44–72)
NEUTS BAND NFR BLD MANUAL: 7.1 % (ref 0–10)
NRBC # BLD AUTO: 0 K/UL
NRBC BLD-RTO: 0 /100 WBC
O2/TOTAL GAS SETTING VFR VENT: 50 %
PCO2 BLDA: 37.3 MMHG (ref 26–37)
PCO2 TEMP ADJ BLDA: 37.8 MMHG (ref 26–37)
PH BLDA: 7.44 [PH] (ref 7.4–7.5)
PH TEMP ADJ BLDA: 7.43 [PH] (ref 7.4–7.5)
PLATELET # BLD AUTO: 178 K/UL (ref 164–446)
PLATELET BLD QL SMEAR: NORMAL
PMV BLD AUTO: 11.7 FL (ref 9–12.9)
PO2 BLDA: 82 MMHG (ref 64–87)
PO2 TEMP ADJ BLDA: 84 MMHG (ref 64–87)
POLYCHROMASIA BLD QL SMEAR: NORMAL
POTASSIUM SERPL-SCNC: 4.1 MMOL/L (ref 3.6–5.5)
PROMYELOCYTES NFR BLD MANUAL: 0.9 %
PROT SERPL-MCNC: 5.3 G/DL (ref 6–8.2)
RBC # BLD AUTO: 2.82 M/UL (ref 4.7–6.1)
RBC BLD AUTO: PRESENT
SAO2 % BLDA: 96 % (ref 93–99)
SODIUM SERPL-SCNC: 139 MMOL/L (ref 135–145)
SPECIMEN DRAWN FROM PATIENT: ABNORMAL
TOXIC GRANULES BLD QL SMEAR: SLIGHT
WBC # BLD AUTO: 11.8 K/UL (ref 4.8–10.8)

## 2018-06-05 PROCEDURE — 71045 X-RAY EXAM CHEST 1 VIEW: CPT

## 2018-06-05 PROCEDURE — 82803 BLOOD GASES ANY COMBINATION: CPT

## 2018-06-05 PROCEDURE — A9270 NON-COVERED ITEM OR SERVICE: HCPCS | Performed by: SURGERY

## 2018-06-05 PROCEDURE — 770022 HCHG ROOM/CARE - ICU (200)

## 2018-06-05 PROCEDURE — 99291 CRITICAL CARE FIRST HOUR: CPT | Performed by: SURGERY

## 2018-06-05 PROCEDURE — 80053 COMPREHEN METABOLIC PANEL: CPT

## 2018-06-05 PROCEDURE — 700111 HCHG RX REV CODE 636 W/ 250 OVERRIDE (IP): Performed by: SURGERY

## 2018-06-05 PROCEDURE — 99292 CRITICAL CARE ADDL 30 MIN: CPT | Performed by: SURGERY

## 2018-06-05 PROCEDURE — 700105 HCHG RX REV CODE 258: Performed by: SURGERY

## 2018-06-05 PROCEDURE — 94640 AIRWAY INHALATION TREATMENT: CPT

## 2018-06-05 PROCEDURE — 85027 COMPLETE CBC AUTOMATED: CPT

## 2018-06-05 PROCEDURE — 700101 HCHG RX REV CODE 250: Performed by: SURGERY

## 2018-06-05 PROCEDURE — 37799 UNLISTED PX VASCULAR SURGERY: CPT

## 2018-06-05 PROCEDURE — 700102 HCHG RX REV CODE 250 W/ 637 OVERRIDE(OP): Performed by: SURGERY

## 2018-06-05 PROCEDURE — 85007 BL SMEAR W/DIFF WBC COUNT: CPT

## 2018-06-05 PROCEDURE — 94003 VENT MGMT INPAT SUBQ DAY: CPT

## 2018-06-05 RX ADMIN — ENOXAPARIN SODIUM 30 MG: 100 INJECTION SUBCUTANEOUS at 20:38

## 2018-06-05 RX ADMIN — IPRATROPIUM BROMIDE AND ALBUTEROL SULFATE 3 ML: .5; 3 SOLUTION RESPIRATORY (INHALATION) at 10:28

## 2018-06-05 RX ADMIN — PROPOFOL 50 MCG/KG/MIN: 10 INJECTION, EMULSION INTRAVENOUS at 22:35

## 2018-06-05 RX ADMIN — FENTANYL CITRATE 50 MCG: 50 INJECTION INTRAMUSCULAR; INTRAVENOUS at 14:11

## 2018-06-05 RX ADMIN — FAMOTIDINE 20 MG: 10 INJECTION, SOLUTION INTRAVENOUS at 09:08

## 2018-06-05 RX ADMIN — IPRATROPIUM BROMIDE AND ALBUTEROL SULFATE 3 ML: .5; 3 SOLUTION RESPIRATORY (INHALATION) at 14:46

## 2018-06-05 RX ADMIN — PROPOFOL 35 MCG/KG/MIN: 10 INJECTION, EMULSION INTRAVENOUS at 18:14

## 2018-06-05 RX ADMIN — METOCLOPRAMIDE 10 MG: 5 INJECTION, SOLUTION INTRAMUSCULAR; INTRAVENOUS at 20:38

## 2018-06-05 RX ADMIN — SODIUM CHLORIDE, POTASSIUM CHLORIDE, SODIUM LACTATE AND CALCIUM CHLORIDE: 600; 310; 30; 20 INJECTION, SOLUTION INTRAVENOUS at 03:23

## 2018-06-05 RX ADMIN — ENOXAPARIN SODIUM 30 MG: 100 INJECTION SUBCUTANEOUS at 09:09

## 2018-06-05 RX ADMIN — FENTANYL CITRATE 100 MCG: 50 INJECTION INTRAMUSCULAR; INTRAVENOUS at 17:53

## 2018-06-05 RX ADMIN — ACETAMINOPHEN 650 MG: 325 TABLET, FILM COATED ORAL at 12:24

## 2018-06-05 RX ADMIN — FENTANYL CITRATE 100 MCG: 50 INJECTION INTRAMUSCULAR; INTRAVENOUS at 19:32

## 2018-06-05 RX ADMIN — Medication 150 MCG/HR: at 03:06

## 2018-06-05 RX ADMIN — PROPOFOL 35 MCG/KG/MIN: 10 INJECTION, EMULSION INTRAVENOUS at 09:00

## 2018-06-05 RX ADMIN — FENTANYL CITRATE 100 MCG: 50 INJECTION INTRAMUSCULAR; INTRAVENOUS at 22:21

## 2018-06-05 RX ADMIN — IPRATROPIUM BROMIDE AND ALBUTEROL SULFATE 3 ML: .5; 3 SOLUTION RESPIRATORY (INHALATION) at 02:18

## 2018-06-05 RX ADMIN — ACETAMINOPHEN 650 MG: 325 TABLET, FILM COATED ORAL at 18:13

## 2018-06-05 RX ADMIN — IPRATROPIUM BROMIDE AND ALBUTEROL SULFATE 3 ML: .5; 3 SOLUTION RESPIRATORY (INHALATION) at 06:45

## 2018-06-05 RX ADMIN — PROPOFOL 30 MCG/KG/MIN: 10 INJECTION, EMULSION INTRAVENOUS at 13:37

## 2018-06-05 RX ADMIN — METOCLOPRAMIDE 10 MG: 5 INJECTION, SOLUTION INTRAMUSCULAR; INTRAVENOUS at 09:09

## 2018-06-05 RX ADMIN — PROPOFOL 50 MCG/KG/MIN: 10 INJECTION, EMULSION INTRAVENOUS at 00:53

## 2018-06-05 RX ADMIN — PROPOFOL 50 MCG/KG/MIN: 10 INJECTION, EMULSION INTRAVENOUS at 02:54

## 2018-06-05 RX ADMIN — IPRATROPIUM BROMIDE AND ALBUTEROL SULFATE 3 ML: .5; 3 SOLUTION RESPIRATORY (INHALATION) at 22:29

## 2018-06-05 RX ADMIN — ACETAMINOPHEN 650 MG: 325 TABLET, FILM COATED ORAL at 23:50

## 2018-06-05 RX ADMIN — METOCLOPRAMIDE 10 MG: 5 INJECTION, SOLUTION INTRAMUSCULAR; INTRAVENOUS at 15:35

## 2018-06-05 RX ADMIN — FAMOTIDINE 20 MG: 20 TABLET ORAL at 20:38

## 2018-06-05 RX ADMIN — FENTANYL CITRATE 100 MCG: 50 INJECTION INTRAMUSCULAR; INTRAVENOUS at 16:50

## 2018-06-05 RX ADMIN — FENTANYL CITRATE 100 MCG: 50 INJECTION INTRAMUSCULAR; INTRAVENOUS at 15:11

## 2018-06-05 RX ADMIN — PROPOFOL 50 MCG/KG/MIN: 10 INJECTION, EMULSION INTRAVENOUS at 06:06

## 2018-06-05 RX ADMIN — FENTANYL CITRATE 100 MCG: 50 INJECTION INTRAMUSCULAR; INTRAVENOUS at 20:38

## 2018-06-05 RX ADMIN — IPRATROPIUM BROMIDE AND ALBUTEROL SULFATE 3 ML: .5; 3 SOLUTION RESPIRATORY (INHALATION) at 18:30

## 2018-06-05 NOTE — CARE PLAN
Problem: Mobility  Goal: Risk for activity intolerance will decrease  Unable to mobilize d/t Respiratory distress
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
## 2018-06-05 NOTE — DISCHARGE PLANNING
Medical Social Work    SW left another vm for pt's wife. SW spoke to bedside RN who stated that pt's wife does not visit anymore but that pt's wife does call in. SW requested that bedside RN get pt's wife's number in case it was inaccurate in EPIC.
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
## 2018-06-05 NOTE — CARE PLAN
Problem: Nutritional:  Goal: Achieve adequate nutritional intake  Start diet or nutrition support as appropriate   Outcome: MET Date Met: 06/05/18  TF to start

## 2018-06-05 NOTE — CARE PLAN
Problem: Ventilation Defect:  Goal: Ability to achieve and maintain unassisted ventilation or tolerate decreased levels of ventilator support    Intervention: Support and monitor invasive and noninvasive mechanical ventilation  Adult Ventilation Update    Total Vent Days: 5    Patient Lines/Drains/Airways Status    Active Airway     Name: Placement date: Placement time: Site: Days:    Airway Group ET Tube 8.0 06/01/18   1030      5              Barriers to Wean: Other (Comments) (pt on aprv) (06/04/18 0710)    Cough: Non Productive (06/05/18 0400)  Sputum Amount: Scant (06/05/18 0400)  Sputum Color: Clear (06/05/18 0400)  Sputum Consistency: Thin (06/05/18 0400)    Mobility  Level of Mobility: Level I (06/05/18 0000)  Activity Performed: Unable to mobilize (06/04/18 2000)  Reason Not Mobilized: Unstable condition (06/05/18 0000)  Mobilization Comments: APRV, unstable VS (06/04/18 2000)

## 2018-06-05 NOTE — CARE PLAN
Problem: Bowel/Gastric:  Goal: Will not experience complications related to bowel motility  Outcome: PROGRESSING SLOWER THAN EXPECTED  Abdominal study performed with serial KUB's. Contrast made it into the colon suggesting illleus rather than obstruction. Patient's NG still putting out brown stool like fluid.     Problem: Respiratory:  Goal: Respiratory status will improve  Outcome: PROGRESSING SLOWER THAN EXPECTED  Patient currently on APRV vent setting with FiO2 50%. Patient not tolerating large turns, sheet changes or being laid flat. Patient desaturating into the 70's
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
## 2018-06-05 NOTE — CARE PLAN
Problem: Pain Management  Goal: Pain level will decrease to patient's comfort goal  Outcome: PROGRESSING SLOWER THAN EXPECTED  Pain controlled with Fent at 150mcg/hour

## 2018-06-06 ENCOUNTER — APPOINTMENT (OUTPATIENT)
Dept: RADIOLOGY | Facility: MEDICAL CENTER | Age: 63
DRG: 003 | End: 2018-06-06
Attending: SURGERY
Payer: COMMERCIAL

## 2018-06-06 PROBLEM — D72.829 LEUKOCYTOSIS: Status: ACTIVE | Noted: 2018-06-06

## 2018-06-06 LAB
ACTION RANGE TRIGGERED IACRT: NO
ALBUMIN SERPL BCP-MCNC: 2.5 G/DL (ref 3.2–4.9)
ALBUMIN/GLOB SERPL: 0.8 G/DL
ALP SERPL-CCNC: 84 U/L (ref 30–99)
ALT SERPL-CCNC: 70 U/L (ref 2–50)
ANION GAP SERPL CALC-SCNC: 8 MMOL/L (ref 0–11.9)
ANISOCYTOSIS BLD QL SMEAR: ABNORMAL
AST SERPL-CCNC: 83 U/L (ref 12–45)
BASE EXCESS BLDA CALC-SCNC: -1 MMOL/L (ref -4–3)
BASOPHILS # BLD AUTO: 0.9 % (ref 0–1.8)
BASOPHILS # BLD: 0.13 K/UL (ref 0–0.12)
BILIRUB SERPL-MCNC: 2.2 MG/DL (ref 0.1–1.5)
BODY TEMPERATURE: ABNORMAL DEGREES
BUN SERPL-MCNC: 26 MG/DL (ref 8–22)
CALCIUM SERPL-MCNC: 8.2 MG/DL (ref 8.5–10.5)
CHLORIDE SERPL-SCNC: 106 MMOL/L (ref 96–112)
CO2 BLDA-SCNC: 26 MMOL/L (ref 20–33)
CO2 SERPL-SCNC: 26 MMOL/L (ref 20–33)
CREAT SERPL-MCNC: 0.88 MG/DL (ref 0.5–1.4)
EOSINOPHIL # BLD AUTO: 0.52 K/UL (ref 0–0.51)
EOSINOPHIL NFR BLD: 3.5 % (ref 0–6.9)
ERYTHROCYTE [DISTWIDTH] IN BLOOD BY AUTOMATED COUNT: 48.2 FL (ref 35.9–50)
GLOBULIN SER CALC-MCNC: 3 G/DL (ref 1.9–3.5)
GLUCOSE SERPL-MCNC: 110 MG/DL (ref 65–99)
HCO3 BLDA-SCNC: 24.4 MMOL/L (ref 17–25)
HCT VFR BLD AUTO: 28.5 % (ref 42–52)
HGB BLD-MCNC: 9.1 G/DL (ref 14–18)
HYPOCHROMIA BLD QL SMEAR: ABNORMAL
INST. QUALIFIED PATIENT IIQPT: YES
LYMPHOCYTES # BLD AUTO: 0.78 K/UL (ref 1–4.8)
LYMPHOCYTES NFR BLD: 5.3 % (ref 22–41)
MACROCYTES BLD QL SMEAR: ABNORMAL
MANUAL DIFF BLD: NORMAL
MCH RBC QN AUTO: 31 PG (ref 27–33)
MCHC RBC AUTO-ENTMCNC: 31.9 G/DL (ref 33.7–35.3)
MCV RBC AUTO: 96.9 FL (ref 81.4–97.8)
METAMYELOCYTES NFR BLD MANUAL: 0.9 %
MICROCYTES BLD QL SMEAR: ABNORMAL
MONOCYTES # BLD AUTO: 1.18 K/UL (ref 0–0.85)
MONOCYTES NFR BLD AUTO: 8 % (ref 0–13.4)
MORPHOLOGY BLD-IMP: NORMAL
MYELOCYTES NFR BLD MANUAL: 2.7 %
NEUTROPHILS # BLD AUTO: 11.65 K/UL (ref 1.82–7.42)
NEUTROPHILS NFR BLD: 74.3 % (ref 44–72)
NEUTS BAND NFR BLD MANUAL: 4.4 % (ref 0–10)
NRBC # BLD AUTO: 0 K/UL
NRBC BLD-RTO: 0 /100 WBC
O2/TOTAL GAS SETTING VFR VENT: 70 %
PCO2 BLDA: 43.1 MMHG (ref 26–37)
PCO2 TEMP ADJ BLDA: 44.5 MMHG (ref 26–37)
PH BLDA: 7.36 [PH] (ref 7.4–7.5)
PH TEMP ADJ BLDA: 7.35 [PH] (ref 7.4–7.5)
PLATELET # BLD AUTO: 232 K/UL (ref 164–446)
PLATELET BLD QL SMEAR: NORMAL
PMV BLD AUTO: 11.5 FL (ref 9–12.9)
PO2 BLDA: 62 MMHG (ref 64–87)
PO2 TEMP ADJ BLDA: 65 MMHG (ref 64–87)
POLYCHROMASIA BLD QL SMEAR: NORMAL
POTASSIUM SERPL-SCNC: 3.9 MMOL/L (ref 3.6–5.5)
PROT SERPL-MCNC: 5.5 G/DL (ref 6–8.2)
RBC # BLD AUTO: 2.94 M/UL (ref 4.7–6.1)
RBC BLD AUTO: PRESENT
SAO2 % BLDA: 90 % (ref 93–99)
SODIUM SERPL-SCNC: 140 MMOL/L (ref 135–145)
SPECIMEN DRAWN FROM PATIENT: ABNORMAL
TRIGL SERPL-MCNC: 304 MG/DL (ref 0–149)
WBC # BLD AUTO: 14.8 K/UL (ref 4.8–10.8)

## 2018-06-06 PROCEDURE — 94640 AIRWAY INHALATION TREATMENT: CPT

## 2018-06-06 PROCEDURE — 0B9F8ZX DRAINAGE OF RIGHT LOWER LUNG LOBE, VIA NATURAL OR ARTIFICIAL OPENING ENDOSCOPIC, DIAGNOSTIC: ICD-10-PCS | Performed by: SURGERY

## 2018-06-06 PROCEDURE — 94003 VENT MGMT INPAT SUBQ DAY: CPT

## 2018-06-06 PROCEDURE — 770022 HCHG ROOM/CARE - ICU (200)

## 2018-06-06 PROCEDURE — 700105 HCHG RX REV CODE 258: Performed by: SURGERY

## 2018-06-06 PROCEDURE — 700102 HCHG RX REV CODE 250 W/ 637 OVERRIDE(OP): Performed by: SURGERY

## 2018-06-06 PROCEDURE — 700111 HCHG RX REV CODE 636 W/ 250 OVERRIDE (IP): Performed by: SURGERY

## 2018-06-06 PROCEDURE — 87186 SC STD MICRODIL/AGAR DIL: CPT

## 2018-06-06 PROCEDURE — 87040 BLOOD CULTURE FOR BACTERIA: CPT

## 2018-06-06 PROCEDURE — 87205 SMEAR GRAM STAIN: CPT

## 2018-06-06 PROCEDURE — 0B9M8ZZ DRAINAGE OF BILATERAL LUNGS, VIA NATURAL OR ARTIFICIAL OPENING ENDOSCOPIC: ICD-10-PCS | Performed by: SURGERY

## 2018-06-06 PROCEDURE — A9270 NON-COVERED ITEM OR SERVICE: HCPCS | Performed by: SURGERY

## 2018-06-06 PROCEDURE — 99292 CRITICAL CARE ADDL 30 MIN: CPT | Mod: 25 | Performed by: SURGERY

## 2018-06-06 PROCEDURE — 99152 MOD SED SAME PHYS/QHP 5/>YRS: CPT

## 2018-06-06 PROCEDURE — 80053 COMPREHEN METABOLIC PANEL: CPT

## 2018-06-06 PROCEDURE — 700112 HCHG RX REV CODE 229: Performed by: SURGERY

## 2018-06-06 PROCEDURE — 84478 ASSAY OF TRIGLYCERIDES: CPT

## 2018-06-06 PROCEDURE — 82803 BLOOD GASES ANY COMBINATION: CPT

## 2018-06-06 PROCEDURE — 85027 COMPLETE CBC AUTOMATED: CPT

## 2018-06-06 PROCEDURE — 700101 HCHG RX REV CODE 250: Performed by: SURGERY

## 2018-06-06 PROCEDURE — 85007 BL SMEAR W/DIFF WBC COUNT: CPT

## 2018-06-06 PROCEDURE — 87077 CULTURE AEROBIC IDENTIFY: CPT

## 2018-06-06 PROCEDURE — 99153 MOD SED SAME PHYS/QHP EA: CPT

## 2018-06-06 PROCEDURE — 99291 CRITICAL CARE FIRST HOUR: CPT | Mod: 25 | Performed by: SURGERY

## 2018-06-06 PROCEDURE — 37799 UNLISTED PX VASCULAR SURGERY: CPT

## 2018-06-06 PROCEDURE — 302978 HCHG BRONCHOSCOPY-DIAGNOSTIC

## 2018-06-06 PROCEDURE — 71045 X-RAY EXAM CHEST 1 VIEW: CPT

## 2018-06-06 PROCEDURE — 87070 CULTURE OTHR SPECIMN AEROBIC: CPT

## 2018-06-06 PROCEDURE — 31624 DX BRONCHOSCOPE/LAVAGE: CPT | Performed by: SURGERY

## 2018-06-06 RX ORDER — DOCUSATE SODIUM 50 MG/5ML
100 LIQUID ORAL 2 TIMES DAILY
Status: DISCONTINUED | OUTPATIENT
Start: 2018-06-06 | End: 2018-07-04 | Stop reason: HOSPADM

## 2018-06-06 RX ORDER — OXYCODONE HYDROCHLORIDE 5 MG/1
5-15 TABLET ORAL
Status: DISCONTINUED | OUTPATIENT
Start: 2018-06-06 | End: 2018-07-04 | Stop reason: HOSPADM

## 2018-06-06 RX ORDER — VECURONIUM BROMIDE 1 MG/ML
10 INJECTION, POWDER, LYOPHILIZED, FOR SOLUTION INTRAVENOUS ONCE
Status: COMPLETED | OUTPATIENT
Start: 2018-06-06 | End: 2018-06-06

## 2018-06-06 RX ORDER — DOCUSATE SODIUM 100 MG/1
100 CAPSULE, LIQUID FILLED ORAL 2 TIMES DAILY
Status: DISCONTINUED | OUTPATIENT
Start: 2018-06-06 | End: 2018-06-10

## 2018-06-06 RX ADMIN — OXYCODONE HYDROCHLORIDE 15 MG: 5 TABLET ORAL at 16:18

## 2018-06-06 RX ADMIN — FENTANYL CITRATE 100 MCG: 50 INJECTION INTRAMUSCULAR; INTRAVENOUS at 13:32

## 2018-06-06 RX ADMIN — IPRATROPIUM BROMIDE AND ALBUTEROL SULFATE 3 ML: .5; 3 SOLUTION RESPIRATORY (INHALATION) at 22:17

## 2018-06-06 RX ADMIN — CELECOXIB 200 MG: 200 CAPSULE ORAL at 07:55

## 2018-06-06 RX ADMIN — OXYCODONE HYDROCHLORIDE 15 MG: 5 TABLET ORAL at 22:09

## 2018-06-06 RX ADMIN — OXYCODONE HYDROCHLORIDE 10 MG: 10 TABLET ORAL at 07:55

## 2018-06-06 RX ADMIN — IPRATROPIUM BROMIDE AND ALBUTEROL SULFATE 3 ML: .5; 3 SOLUTION RESPIRATORY (INHALATION) at 20:15

## 2018-06-06 RX ADMIN — CEFEPIME 2 G: 2 INJECTION, POWDER, FOR SOLUTION INTRAMUSCULAR; INTRAVENOUS at 21:39

## 2018-06-06 RX ADMIN — CEFEPIME 2 G: 2 INJECTION, POWDER, FOR SOLUTION INTRAMUSCULAR; INTRAVENOUS at 13:30

## 2018-06-06 RX ADMIN — FENTANYL CITRATE 100 MCG: 50 INJECTION INTRAMUSCULAR; INTRAVENOUS at 10:26

## 2018-06-06 RX ADMIN — PROPOFOL 30 MCG/KG/MIN: 10 INJECTION, EMULSION INTRAVENOUS at 07:14

## 2018-06-06 RX ADMIN — IPRATROPIUM BROMIDE AND ALBUTEROL SULFATE 3 ML: .5; 3 SOLUTION RESPIRATORY (INHALATION) at 02:43

## 2018-06-06 RX ADMIN — ACETAMINOPHEN 650 MG: 325 TABLET, FILM COATED ORAL at 16:41

## 2018-06-06 RX ADMIN — METOCLOPRAMIDE 10 MG: 5 INJECTION, SOLUTION INTRAMUSCULAR; INTRAVENOUS at 13:31

## 2018-06-06 RX ADMIN — FAMOTIDINE 20 MG: 10 INJECTION, SOLUTION INTRAVENOUS at 08:02

## 2018-06-06 RX ADMIN — FENTANYL CITRATE 100 MCG: 50 INJECTION INTRAMUSCULAR; INTRAVENOUS at 08:40

## 2018-06-06 RX ADMIN — IPRATROPIUM BROMIDE AND ALBUTEROL SULFATE 3 ML: .5; 3 SOLUTION RESPIRATORY (INHALATION) at 06:58

## 2018-06-06 RX ADMIN — DOCUSATE SODIUM 100 MG: 50 LIQUID ORAL at 08:43

## 2018-06-06 RX ADMIN — PROPOFOL 50 MCG/KG/MIN: 10 INJECTION, EMULSION INTRAVENOUS at 03:38

## 2018-06-06 RX ADMIN — PROPOFOL 45 MCG/KG/MIN: 10 INJECTION, EMULSION INTRAVENOUS at 12:37

## 2018-06-06 RX ADMIN — VECURONIUM BROMIDE 10 MG: 10 INJECTION, POWDER, LYOPHILIZED, FOR SOLUTION INTRAVENOUS at 10:25

## 2018-06-06 RX ADMIN — PROPOFOL 40 MCG/KG/MIN: 10 INJECTION, EMULSION INTRAVENOUS at 21:33

## 2018-06-06 RX ADMIN — FAMOTIDINE 20 MG: 20 TABLET ORAL at 21:39

## 2018-06-06 RX ADMIN — METOCLOPRAMIDE 10 MG: 5 INJECTION, SOLUTION INTRAMUSCULAR; INTRAVENOUS at 21:39

## 2018-06-06 RX ADMIN — SODIUM CHLORIDE, POTASSIUM CHLORIDE, SODIUM LACTATE AND CALCIUM CHLORIDE: 600; 310; 30; 20 INJECTION, SOLUTION INTRAVENOUS at 16:18

## 2018-06-06 RX ADMIN — VANCOMYCIN HYDROCHLORIDE 3000 MG: 100 INJECTION, POWDER, LYOPHILIZED, FOR SOLUTION INTRAVENOUS at 10:43

## 2018-06-06 RX ADMIN — FENTANYL CITRATE 100 MCG: 50 INJECTION INTRAMUSCULAR; INTRAVENOUS at 18:07

## 2018-06-06 RX ADMIN — OXYCODONE HYDROCHLORIDE 15 MG: 5 TABLET ORAL at 12:40

## 2018-06-06 RX ADMIN — FENTANYL CITRATE 100 MCG: 50 INJECTION INTRAMUSCULAR; INTRAVENOUS at 07:14

## 2018-06-06 RX ADMIN — IPRATROPIUM BROMIDE AND ALBUTEROL SULFATE 3 ML: .5; 3 SOLUTION RESPIRATORY (INHALATION) at 11:12

## 2018-06-06 RX ADMIN — ACETAMINOPHEN 650 MG: 325 TABLET, FILM COATED ORAL at 12:00

## 2018-06-06 RX ADMIN — SODIUM CHLORIDE, POTASSIUM CHLORIDE, SODIUM LACTATE AND CALCIUM CHLORIDE: 600; 310; 30; 20 INJECTION, SOLUTION INTRAVENOUS at 05:12

## 2018-06-06 RX ADMIN — ENOXAPARIN SODIUM 30 MG: 100 INJECTION SUBCUTANEOUS at 07:55

## 2018-06-06 RX ADMIN — METOCLOPRAMIDE 10 MG: 5 INJECTION, SOLUTION INTRAMUSCULAR; INTRAVENOUS at 07:54

## 2018-06-06 RX ADMIN — IPRATROPIUM BROMIDE AND ALBUTEROL SULFATE 3 ML: .5; 3 SOLUTION RESPIRATORY (INHALATION) at 14:57

## 2018-06-06 RX ADMIN — ENOXAPARIN SODIUM 30 MG: 100 INJECTION SUBCUTANEOUS at 21:38

## 2018-06-06 RX ADMIN — ACETAMINOPHEN 650 MG: 325 TABLET, FILM COATED ORAL at 05:01

## 2018-06-06 RX ADMIN — PROPOFOL 20 MCG/KG/MIN: 10 INJECTION, EMULSION INTRAVENOUS at 16:41

## 2018-06-06 RX ADMIN — FENTANYL CITRATE 100 MCG: 50 INJECTION INTRAMUSCULAR; INTRAVENOUS at 01:32

## 2018-06-06 RX ADMIN — PROPOFOL 50 MCG/KG/MIN: 10 INJECTION, EMULSION INTRAVENOUS at 01:06

## 2018-06-06 RX ADMIN — VANCOMYCIN HYDROCHLORIDE 2000 MG: 100 INJECTION, POWDER, LYOPHILIZED, FOR SOLUTION INTRAVENOUS at 22:00

## 2018-06-06 RX ADMIN — PROPOFOL 50 MCG/KG/MIN: 10 INJECTION, EMULSION INTRAVENOUS at 23:52
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
## 2018-06-06 NOTE — CARE PLAN
Problem: Ventilation Defect:  Goal: Ability to achieve and maintain unassisted ventilation or tolerate decreased levels of ventilator support    Intervention: Support and monitor invasive and noninvasive mechanical ventilation  Vent day . Pt on APRV: Phigh-26, Plow-0, Thigh-4.0, Tlow-0.6. 50%. Pt has 8.0 ETT@26. Min amount of tan secretions noted t/o shift. No SBT at this time. Okay for RT to wean APRV  per protocol.

## 2018-06-06 NOTE — PROGRESS NOTES
Bedside report received from Mickey MELTON. All questions and concerns addressed. Patient vented, sedated, resting comfortably In bed, VSS. 2 RN skin check performed. Gtt's verified.

## 2018-06-06 NOTE — PROGRESS NOTES
Cortrak Placement    Tube Team verified patient name and medical record number prior to tube placement.  Cortrak tube (55 inches, 10 [place]) placed at 80 cm in right nare.  Per Cortrak picture, tube appears to be in the stomach.  Nursing Instructions: Awaiting KUB to confirm placement before use for medications or feeding. Once placement confirmed, flush tube with 30 ml of water, and then remove and save stylet, in patient medication drawer.
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
## 2018-06-06 NOTE — CARE PLAN
Adult Ventilation Update    Total Vent Days: 6    Patient Lines/Drains/Airways Status    Active Airway     Name: Placement date: Placement time: Site: Days:    Airway Group ET Tube 8.0 06/01/18 1030      4                   Plateau Pressure (Q Shift): 26 (06/03/18 1811)  Static Compliance (ml / cm H2O): 52 (06/06/18 0245)    Patient failed trials because of Barriers to Wean: Other (Comments) (Pt remains on APRV, unstable PaO2) (06/05/18 1032)  Barriers to SBT    Length of Weaning Trial          Sputum/Suction   Cough: Non Productive (06/06/18 0400)  Sputum Amount: Large (06/06/18 0400)  Sputum Color: Yellow;Pink Tinged (06/06/18 0400)  Sputum Consistency: Thick;Thin (06/06/18 0400)    Mobility  Level of Mobility: Level I (06/05/18 0000)  Activity Performed: Unable to mobilize (06/05/18 1032)  Time Activity Tolerated: 45 min (05/31/18 2000)  Distance Per Occurrence (ft.): 6 feet (05/31/18 2000)  # of Times Distance was Traveled: 1 (05/31/18 2000)  Assistance / Tolerance: Assistance of Two or More (06/01/18 2000)  Pt Calls for Assistance: No (06/02/18 0800)  Staff Present for Mobilization: RN (05/31/18 2000)  Gait: Steady (06/01/18 0800)  Assistive Devices: Hand held assist (06/01/18 0800)  Reason Not Mobilized: Unstable condition (APRV on vent settings) (06/05/18 1032)  Mobilization Comments: APRV, unstable VS (06/04/18 2000)    Events/Summary/Plan: P-High decreased to 24 per protocol (06/06/18 0245)

## 2018-06-07 ENCOUNTER — APPOINTMENT (OUTPATIENT)
Dept: RADIOLOGY | Facility: MEDICAL CENTER | Age: 63
DRG: 003 | End: 2018-06-07
Attending: SURGERY
Payer: COMMERCIAL

## 2018-06-07 LAB
ACTION RANGE TRIGGERED IACRT: YES
ACTION RANGE TRIGGERED IACRT: YES
BASE EXCESS BLDA CALC-SCNC: -1 MMOL/L (ref -4–3)
BASE EXCESS BLDA CALC-SCNC: -2 MMOL/L (ref -4–3)
BASOPHILS # BLD AUTO: 0 % (ref 0–1.8)
BASOPHILS # BLD: 0 K/UL (ref 0–0.12)
BODY TEMPERATURE: ABNORMAL DEGREES
BODY TEMPERATURE: ABNORMAL DEGREES
CO2 BLDA-SCNC: 28 MMOL/L (ref 20–33)
CO2 BLDA-SCNC: 31 MMOL/L (ref 20–33)
EOSINOPHIL # BLD AUTO: 0.63 K/UL (ref 0–0.51)
EOSINOPHIL NFR BLD: 3.6 % (ref 0–6.9)
ERYTHROCYTE [DISTWIDTH] IN BLOOD BY AUTOMATED COUNT: 49.8 FL (ref 35.9–50)
GRAM STN SPEC: NORMAL
HCO3 BLDA-SCNC: 26.4 MMOL/L (ref 17–25)
HCO3 BLDA-SCNC: 28.2 MMOL/L (ref 17–25)
HCT VFR BLD AUTO: 29.5 % (ref 42–52)
HGB BLD-MCNC: 9.4 G/DL (ref 14–18)
INST. QUALIFIED PATIENT IIQPT: YES
INST. QUALIFIED PATIENT IIQPT: YES
LYMPHOCYTES # BLD AUTO: 1.09 K/UL (ref 1–4.8)
LYMPHOCYTES NFR BLD: 6.2 % (ref 22–41)
MAGNESIUM SERPL-MCNC: 2.5 MG/DL (ref 1.5–2.5)
MANUAL DIFF BLD: NORMAL
MCH RBC QN AUTO: 31 PG (ref 27–33)
MCHC RBC AUTO-ENTMCNC: 31.9 G/DL (ref 33.7–35.3)
MCV RBC AUTO: 97.4 FL (ref 81.4–97.8)
METAMYELOCYTES NFR BLD MANUAL: 0.9 %
MONOCYTES # BLD AUTO: 0.32 K/UL (ref 0–0.85)
MONOCYTES NFR BLD AUTO: 1.8 % (ref 0–13.4)
MORPHOLOGY BLD-IMP: NORMAL
NEUTROPHILS # BLD AUTO: 15.4 K/UL (ref 1.82–7.42)
NEUTROPHILS NFR BLD: 85.7 % (ref 44–72)
NEUTS BAND NFR BLD MANUAL: 1.8 % (ref 0–10)
NRBC # BLD AUTO: 0 K/UL
NRBC BLD-RTO: 0 /100 WBC
O2/TOTAL GAS SETTING VFR VENT: 100 %
O2/TOTAL GAS SETTING VFR VENT: 100 %
PCO2 BLDA: 59.6 MMHG (ref 26–37)
PCO2 BLDA: 80.9 MMHG (ref 26–37)
PCO2 TEMP ADJ BLDA: 60.9 MMHG (ref 26–37)
PCO2 TEMP ADJ BLDA: 78.7 MMHG (ref 26–37)
PH BLDA: 7.15 [PH] (ref 7.4–7.5)
PH BLDA: 7.25 [PH] (ref 7.4–7.5)
PH TEMP ADJ BLDA: 7.16 [PH] (ref 7.4–7.5)
PH TEMP ADJ BLDA: 7.25 [PH] (ref 7.4–7.5)
PHOSPHATE SERPL-MCNC: 6.9 MG/DL (ref 2.5–4.5)
PLATELET # BLD AUTO: 270 K/UL (ref 164–446)
PLATELET BLD QL SMEAR: NORMAL
PMV BLD AUTO: 11.2 FL (ref 9–12.9)
PO2 BLDA: 66 MMHG (ref 64–87)
PO2 BLDA: 81 MMHG (ref 64–87)
PO2 TEMP ADJ BLDA: 64 MMHG (ref 64–87)
PO2 TEMP ADJ BLDA: 83 MMHG (ref 64–87)
RBC # BLD AUTO: 3.03 M/UL (ref 4.7–6.1)
RBC BLD AUTO: PRESENT
SAO2 % BLDA: 85 % (ref 93–99)
SAO2 % BLDA: 93 % (ref 93–99)
SIGNIFICANT IND 70042: NORMAL
SITE SITE: NORMAL
SOURCE SOURCE: NORMAL
SPECIMEN DRAWN FROM PATIENT: ABNORMAL
SPECIMEN DRAWN FROM PATIENT: ABNORMAL
TOXIC GRANULES BLD QL SMEAR: NORMAL
VANCOMYCIN TROUGH SERPL-MCNC: 36.1 UG/ML (ref 10–20)
WBC # BLD AUTO: 17.6 K/UL (ref 4.8–10.8)

## 2018-06-07 PROCEDURE — 304255 HCHG KIT,PERC TRACHE

## 2018-06-07 PROCEDURE — 84100 ASSAY OF PHOSPHORUS: CPT

## 2018-06-07 PROCEDURE — 302977 HCHG BRONCHOSCOPY PROC-THERAPEUTIC

## 2018-06-07 PROCEDURE — 700111 HCHG RX REV CODE 636 W/ 250 OVERRIDE (IP): Performed by: SURGERY

## 2018-06-07 PROCEDURE — 0B9D8ZZ DRAINAGE OF RIGHT MIDDLE LUNG LOBE, VIA NATURAL OR ARTIFICIAL OPENING ENDOSCOPIC: ICD-10-PCS | Performed by: SURGERY

## 2018-06-07 PROCEDURE — A9270 NON-COVERED ITEM OR SERVICE: HCPCS | Performed by: SURGERY

## 2018-06-07 PROCEDURE — 0B113F4 BYPASS TRACHEA TO CUTANEOUS WITH TRACHEOSTOMY DEVICE, PERCUTANEOUS APPROACH: ICD-10-PCS | Performed by: SURGERY

## 2018-06-07 PROCEDURE — 0B968ZZ DRAINAGE OF RIGHT LOWER LOBE BRONCHUS, VIA NATURAL OR ARTIFICIAL OPENING ENDOSCOPIC: ICD-10-PCS | Performed by: SURGERY

## 2018-06-07 PROCEDURE — 700102 HCHG RX REV CODE 250 W/ 637 OVERRIDE(OP): Performed by: SURGERY

## 2018-06-07 PROCEDURE — 94003 VENT MGMT INPAT SUBQ DAY: CPT

## 2018-06-07 PROCEDURE — 770022 HCHG ROOM/CARE - ICU (200)

## 2018-06-07 PROCEDURE — 99292 CRITICAL CARE ADDL 30 MIN: CPT | Mod: 25 | Performed by: SURGERY

## 2018-06-07 PROCEDURE — 99152 MOD SED SAME PHYS/QHP 5/>YRS: CPT

## 2018-06-07 PROCEDURE — 37799 UNLISTED PX VASCULAR SURGERY: CPT

## 2018-06-07 PROCEDURE — 700105 HCHG RX REV CODE 258: Performed by: SURGERY

## 2018-06-07 PROCEDURE — 700112 HCHG RX REV CODE 229: Performed by: SURGERY

## 2018-06-07 PROCEDURE — 71045 X-RAY EXAM CHEST 1 VIEW: CPT

## 2018-06-07 PROCEDURE — 0B9B8ZZ DRAINAGE OF LEFT LOWER LOBE BRONCHUS, VIA NATURAL OR ARTIFICIAL OPENING ENDOSCOPIC: ICD-10-PCS | Performed by: SURGERY

## 2018-06-07 PROCEDURE — 83735 ASSAY OF MAGNESIUM: CPT

## 2018-06-07 PROCEDURE — 82803 BLOOD GASES ANY COMBINATION: CPT

## 2018-06-07 PROCEDURE — 700101 HCHG RX REV CODE 250: Performed by: SURGERY

## 2018-06-07 PROCEDURE — 31622 DX BRONCHOSCOPE/WASH: CPT | Performed by: SURGERY

## 2018-06-07 PROCEDURE — 85007 BL SMEAR W/DIFF WBC COUNT: CPT

## 2018-06-07 PROCEDURE — 85027 COMPLETE CBC AUTOMATED: CPT

## 2018-06-07 PROCEDURE — 99291 CRITICAL CARE FIRST HOUR: CPT | Mod: 25 | Performed by: SURGERY

## 2018-06-07 PROCEDURE — 31600 PLANNED TRACHEOSTOMY: CPT | Performed by: SURGERY

## 2018-06-07 PROCEDURE — 80202 ASSAY OF VANCOMYCIN: CPT

## 2018-06-07 PROCEDURE — 94640 AIRWAY INHALATION TREATMENT: CPT

## 2018-06-07 RX ORDER — VECURONIUM BROMIDE 1 MG/ML
10 INJECTION, POWDER, LYOPHILIZED, FOR SOLUTION INTRAVENOUS ONCE
Status: COMPLETED | OUTPATIENT
Start: 2018-06-07 | End: 2018-06-07

## 2018-06-07 RX ADMIN — CEFEPIME 2 G: 2 INJECTION, POWDER, FOR SOLUTION INTRAMUSCULAR; INTRAVENOUS at 14:47

## 2018-06-07 RX ADMIN — OXYCODONE HYDROCHLORIDE 15 MG: 5 TABLET ORAL at 03:09

## 2018-06-07 RX ADMIN — OXYCODONE HYDROCHLORIDE 10 MG: 5 TABLET ORAL at 17:43

## 2018-06-07 RX ADMIN — ACETAMINOPHEN 650 MG: 325 TABLET, FILM COATED ORAL at 00:21

## 2018-06-07 RX ADMIN — DOCUSATE SODIUM 100 MG: 50 LIQUID ORAL at 09:04

## 2018-06-07 RX ADMIN — METOCLOPRAMIDE 10 MG: 5 INJECTION, SOLUTION INTRAMUSCULAR; INTRAVENOUS at 09:12

## 2018-06-07 RX ADMIN — PROPOFOL 40 MCG/KG/MIN: 10 INJECTION, EMULSION INTRAVENOUS at 16:40

## 2018-06-07 RX ADMIN — ACETAMINOPHEN 650 MG: 325 TABLET, FILM COATED ORAL at 05:06

## 2018-06-07 RX ADMIN — CEFEPIME 2 G: 2 INJECTION, POWDER, FOR SOLUTION INTRAMUSCULAR; INTRAVENOUS at 05:05

## 2018-06-07 RX ADMIN — SODIUM BICARBONATE 3 ML: 84 INJECTION, SOLUTION INTRAVENOUS at 22:20

## 2018-06-07 RX ADMIN — SODIUM BICARBONATE 2 ML: 84 INJECTION, SOLUTION INTRAVENOUS at 08:20

## 2018-06-07 RX ADMIN — SODIUM BICARBONATE 3 ML: 84 INJECTION, SOLUTION INTRAVENOUS at 19:08

## 2018-06-07 RX ADMIN — IPRATROPIUM BROMIDE AND ALBUTEROL SULFATE 3 ML: .5; 3 SOLUTION RESPIRATORY (INHALATION) at 08:20

## 2018-06-07 RX ADMIN — ENOXAPARIN SODIUM 30 MG: 100 INJECTION SUBCUTANEOUS at 09:05

## 2018-06-07 RX ADMIN — SENNOSIDES AND DOCUSATE SODIUM 1 TABLET: 8.6; 5 TABLET ORAL at 21:13

## 2018-06-07 RX ADMIN — DOCUSATE SODIUM 100 MG: 50 LIQUID ORAL at 21:13

## 2018-06-07 RX ADMIN — IPRATROPIUM BROMIDE AND ALBUTEROL SULFATE 3 ML: .5; 3 SOLUTION RESPIRATORY (INHALATION) at 02:59

## 2018-06-07 RX ADMIN — METOCLOPRAMIDE 10 MG: 5 INJECTION, SOLUTION INTRAMUSCULAR; INTRAVENOUS at 14:45

## 2018-06-07 RX ADMIN — SODIUM BICARBONATE 2 ML: 84 INJECTION, SOLUTION INTRAVENOUS at 10:37

## 2018-06-07 RX ADMIN — PROPOFOL 60 MCG/KG/MIN: 10 INJECTION, EMULSION INTRAVENOUS at 11:16

## 2018-06-07 RX ADMIN — SODIUM BICARBONATE 2 ML: 84 INJECTION, SOLUTION INTRAVENOUS at 14:51

## 2018-06-07 RX ADMIN — IPRATROPIUM BROMIDE AND ALBUTEROL SULFATE 3 ML: .5; 3 SOLUTION RESPIRATORY (INHALATION) at 10:36

## 2018-06-07 RX ADMIN — IPRATROPIUM BROMIDE AND ALBUTEROL SULFATE 3 ML: .5; 3 SOLUTION RESPIRATORY (INHALATION) at 22:21

## 2018-06-07 RX ADMIN — SODIUM CHLORIDE, POTASSIUM CHLORIDE, SODIUM LACTATE AND CALCIUM CHLORIDE: 600; 310; 30; 20 INJECTION, SOLUTION INTRAVENOUS at 03:30

## 2018-06-07 RX ADMIN — CELECOXIB 200 MG: 200 CAPSULE ORAL at 09:04

## 2018-06-07 RX ADMIN — ENOXAPARIN SODIUM 30 MG: 100 INJECTION SUBCUTANEOUS at 21:13

## 2018-06-07 RX ADMIN — ACETAMINOPHEN 650 MG: 325 TABLET, FILM COATED ORAL at 11:20

## 2018-06-07 RX ADMIN — FAMOTIDINE 20 MG: 10 INJECTION, SOLUTION INTRAVENOUS at 21:13

## 2018-06-07 RX ADMIN — PROPOFOL 30 MCG/KG/MIN: 10 INJECTION, EMULSION INTRAVENOUS at 19:30

## 2018-06-07 RX ADMIN — METOCLOPRAMIDE 10 MG: 5 INJECTION, SOLUTION INTRAMUSCULAR; INTRAVENOUS at 21:13

## 2018-06-07 RX ADMIN — PROPOFOL 80 MCG/KG/MIN: 10 INJECTION, EMULSION INTRAVENOUS at 13:30

## 2018-06-07 RX ADMIN — ACETAMINOPHEN 650 MG: 325 TABLET, FILM COATED ORAL at 17:42

## 2018-06-07 RX ADMIN — VANCOMYCIN HYDROCHLORIDE 2000 MG: 100 INJECTION, POWDER, LYOPHILIZED, FOR SOLUTION INTRAVENOUS at 11:27

## 2018-06-07 RX ADMIN — CEFEPIME 2 G: 2 INJECTION, POWDER, FOR SOLUTION INTRAMUSCULAR; INTRAVENOUS at 22:00

## 2018-06-07 RX ADMIN — IPRATROPIUM BROMIDE AND ALBUTEROL SULFATE 3 ML: .5; 3 SOLUTION RESPIRATORY (INHALATION) at 19:08

## 2018-06-07 RX ADMIN — IPRATROPIUM BROMIDE AND ALBUTEROL SULFATE 3 ML: .5; 3 SOLUTION RESPIRATORY (INHALATION) at 14:51

## 2018-06-07 RX ADMIN — IPRATROPIUM BROMIDE AND ALBUTEROL SULFATE 3 ML: .5; 3 SOLUTION RESPIRATORY (INHALATION) at 07:21

## 2018-06-07 RX ADMIN — PROPOFOL 50 MCG/KG/MIN: 10 INJECTION, EMULSION INTRAVENOUS at 03:09

## 2018-06-07 RX ADMIN — VECURONIUM BROMIDE 10 MG: 10 INJECTION, POWDER, LYOPHILIZED, FOR SOLUTION INTRAVENOUS at 10:40

## 2018-06-07 RX ADMIN — PROPOFOL 40 MCG/KG/MIN: 10 INJECTION, EMULSION INTRAVENOUS at 06:13

## 2018-06-07 RX ADMIN — FAMOTIDINE 20 MG: 20 TABLET ORAL at 09:05

## 2018-06-07 RX ADMIN — POLYETHYLENE GLYCOL 3350 1 PACKET: 17 POWDER, FOR SOLUTION ORAL at 21:14

## 2018-06-07 RX ADMIN — SODIUM CHLORIDE, POTASSIUM CHLORIDE, SODIUM LACTATE AND CALCIUM CHLORIDE: 600; 310; 30; 20 INJECTION, SOLUTION INTRAVENOUS at 14:49

## 2018-06-07 NOTE — CARE PLAN
Problem: Ventilation Defect:  Goal: Ability to achieve and maintain unassisted ventilation or tolerate decreased levels of ventilator support  Adult Ventilation Update    Total Vent Days: 6    APRV P 24/0, T 4/0.6, 70%    Patient Lines/Drains/Airways Status    Active Airway     Name: Placement date: Placement time: Site: Days:    Airway Group ET Tube 8.0 06/01/18   1030      5            Cough: Productive (06/06/18 1511)  Sputum Amount: Moderate (06/06/18 1511)  Sputum Color: Tan;Yellow;Pink Tinged (06/06/18 1511)  Sputum Consistency: Thick;Thin (06/06/18 1511)    Mobility  Level of Mobility: Level I (06/06/18 0400)  Activity Performed: Unable to mobilize (06/06/18 0400)  Reason Not Mobilized: Unstable condition (06/06/18 1200)  Mobilization Comments: SYS > 190, RR> 40, HR> 100 (06/06/18 1200)    Events/Summary/Plan: bronch done today, pt left on 100% FiO2 post bronch, now down to 70%, will continue to monitor
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
## 2018-06-07 NOTE — CARE PLAN
Problem: Ventilation Defect:  Goal: Ability to achieve and maintain unassisted ventilation or tolerate decreased levels of ventilator support  Outcome: PROGRESSING SLOWER THAN EXPECTED  Adult Ventilation Update    Total Vent Days: 7  Trach Day: 1    Patient Lines/Drains/Airways Status    Active Airway     Name: Placement date: Placement time: Site: Days:    Airway Group Portex Trach Tracheostomy 8.0 06/07/18   1055   Tracheostomy   1                Plateau Pressure (Q Shift): 26 (06/03/18 1811)  Static Compliance (ml / cm H2O): 32.3 (06/07/18 1452)    Patient failed trials because of Barriers to Wean: Other (Comments) (APRV) (06/06/18 0658)    Cough: Productive (06/07/18 1452)  Sputum Amount: Large (06/07/18 1600)  Sputum Color: Tan;Yellow (06/07/18 1600)  Sputum Consistency: Thick (06/07/18 1600)    Mobility  Level of Mobility: Level I (06/07/18 0800)  Activity Performed: Unable to mobilize (06/07/18 1200)  Reason Not Mobilized: Unstable condition (06/07/18 1200)      Events/Summary/Plan: Thick tan secretions seen with suctioning, began Sodium Bicarb therapy with duoneb tx's Q4. Pt remained on 90% FiO2 throughout morning shift, pt desaturated to 90% post trach placement, Decreased Phigh to 24 improved Spo2 to 94%, and vt's are 500. Suctioning bloody secretions from Trach. Will continue to monitor.
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
## 2018-06-07 NOTE — CARE PLAN
Problem: Ventilation Defect:  Goal: Ability to achieve and maintain unassisted ventilation or tolerate decreased levels of ventilator support  Outcome: PROGRESSING SLOWER THAN EXPECTED

## 2018-06-07 NOTE — PROGRESS NOTES
Dr Cindi Castillo and Rt at bedside for perc trach and bronchoscopy procedure. Meds given per  MD Order ( see MAR). Pt tolerated well, vitals signs documented in flow sheets.
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
## 2018-06-07 NOTE — RESPIRATORY CARE
Adult Ventilation Update    Total Vent Days: 7    Patient Lines/Drains/Airways Status    Active Airway     Name: Placement date: Placement time: Site: Days:    Airway Group ET Tube 8.0 06/01/18   1030      5                Patient failed trials because of Barriers to Wean: Other (Comments) (APRV) (06/06/18 0658)    Sputum/Suction   Cough: Congested;Productive (06/06/18 2217)  Sputum Amount: Large (06/06/18 2217)  Sputum Color: Yellow (06/06/18 2217)  Sputum Consistency: Mucous Plugs (06/06/18 2217)  Pt Calls for Assistance: No (06/02/18 0800)  Events/Summary/Plan: P-high to 26, Re: desaturation (06/06/18 2217)  Pt. Has large thick yellow mucus plugs.

## 2018-06-07 NOTE — CARE PLAN
Problem: Pain Management  Goal: Pain level will decrease to patient's comfort goal  Outcome: PROGRESSING AS EXPECTED   06/01/18 0602 06/03/18 1800 06/07/18 0200   OTHER   Pain Scale 0 - 10  --  Assumed Pain Present --    CPOT Total Score --  --  1   Comfort Goal Comfort at Rest;Comfort with Movement --  --      Patient has appropriate PRN meds on MAR    Problem: Mobility  Goal: Risk for activity intolerance will decrease  Outcome: PROGRESSING SLOWER THAN EXPECTED  Patient not tolerating turning or sheet changes without becoming hypertensive and desaturating. Unable to mobilize patient due to being on 100% fio2, flail segment on R chest, desaturations and unstable blood pressure. Range of motion performed in bed for patient.
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
## 2018-06-07 NOTE — CARE PLAN
Problem: Respiratory:  Goal: Respiratory status will improve  Q2 hour oral care or PRN VAP preventative measures in place, collaboration with RT in place,     Problem: Skin Integrity  Goal: Risk for impaired skin integrity will decrease  Q2 hour turning in place, pillows in use for support and positioning, mepilex in use.

## 2018-06-07 NOTE — CARE PLAN
Problem: Nutritional:  Goal: Nutrition support tolerated and meeting greater than 85% of estimated needs  Outcome: MET Date Met: 06/07/18

## 2018-06-08 ENCOUNTER — APPOINTMENT (OUTPATIENT)
Dept: RADIOLOGY | Facility: MEDICAL CENTER | Age: 63
DRG: 003 | End: 2018-06-08
Attending: SURGERY
Payer: COMMERCIAL

## 2018-06-08 LAB
ACTION RANGE TRIGGERED IACRT: YES
ALBUMIN SERPL BCP-MCNC: 2.5 G/DL (ref 3.2–4.9)
ALBUMIN/GLOB SERPL: 0.8 G/DL
ALP SERPL-CCNC: 108 U/L (ref 30–99)
ALT SERPL-CCNC: 158 U/L (ref 2–50)
AMMONIA PLAS-SCNC: 80 UMOL/L (ref 11–45)
ANION GAP SERPL CALC-SCNC: 7 MMOL/L (ref 0–11.9)
AST SERPL-CCNC: 214 U/L (ref 12–45)
BACTERIA SPEC RESP CULT: ABNORMAL
BACTERIA SPEC RESP CULT: ABNORMAL
BASE EXCESS BLDA CALC-SCNC: 1 MMOL/L (ref -4–3)
BASOPHILS # BLD AUTO: 0.5 % (ref 0–1.8)
BASOPHILS # BLD: 0.1 K/UL (ref 0–0.12)
BILIRUB SERPL-MCNC: 2.3 MG/DL (ref 0.1–1.5)
BODY TEMPERATURE: ABNORMAL DEGREES
BUN SERPL-MCNC: 47 MG/DL (ref 8–22)
CALCIUM SERPL-MCNC: 8.3 MG/DL (ref 8.5–10.5)
CHLORIDE SERPL-SCNC: 110 MMOL/L (ref 96–112)
CO2 BLDA-SCNC: 31 MMOL/L (ref 20–33)
CO2 SERPL-SCNC: 27 MMOL/L (ref 20–33)
COMMENT 1642: NORMAL
CREAT SERPL-MCNC: 0.99 MG/DL (ref 0.5–1.4)
EOSINOPHIL # BLD AUTO: 0.05 K/UL (ref 0–0.51)
EOSINOPHIL NFR BLD: 0.2 % (ref 0–6.9)
ERYTHROCYTE [DISTWIDTH] IN BLOOD BY AUTOMATED COUNT: 53.1 FL (ref 35.9–50)
GLOBULIN SER CALC-MCNC: 3.3 G/DL (ref 1.9–3.5)
GLUCOSE SERPL-MCNC: 147 MG/DL (ref 65–99)
GRAM STN SPEC: ABNORMAL
HCO3 BLDA-SCNC: 28.9 MMOL/L (ref 17–25)
HCT VFR BLD AUTO: 31.2 % (ref 42–52)
HGB BLD-MCNC: 9.7 G/DL (ref 14–18)
IMM GRANULOCYTES # BLD AUTO: 1.2 K/UL (ref 0–0.11)
IMM GRANULOCYTES NFR BLD AUTO: 5.4 % (ref 0–0.9)
INST. QUALIFIED PATIENT IIQPT: YES
LYMPHOCYTES # BLD AUTO: 1.25 K/UL (ref 1–4.8)
LYMPHOCYTES NFR BLD: 5.6 % (ref 22–41)
MCH RBC QN AUTO: 31.5 PG (ref 27–33)
MCHC RBC AUTO-ENTMCNC: 31.1 G/DL (ref 33.7–35.3)
MCV RBC AUTO: 101.3 FL (ref 81.4–97.8)
MONOCYTES # BLD AUTO: 1.14 K/UL (ref 0–0.85)
MONOCYTES NFR BLD AUTO: 5.2 % (ref 0–13.4)
MORPHOLOGY BLD-IMP: NORMAL
NEUTROPHILS # BLD AUTO: 18.39 K/UL (ref 1.82–7.42)
NEUTROPHILS NFR BLD: 83.1 % (ref 44–72)
NRBC # BLD AUTO: 0.04 K/UL
NRBC BLD-RTO: 0.2 /100 WBC
O2/TOTAL GAS SETTING VFR VENT: 80 %
PCO2 BLDA: 63.3 MMHG (ref 26–37)
PCO2 TEMP ADJ BLDA: 63 MMHG (ref 26–37)
PH BLDA: 7.27 [PH] (ref 7.4–7.5)
PH TEMP ADJ BLDA: 7.27 [PH] (ref 7.4–7.5)
PLATELET # BLD AUTO: 318 K/UL (ref 164–446)
PMV BLD AUTO: 11.7 FL (ref 9–12.9)
PO2 BLDA: 93 MMHG (ref 64–87)
PO2 TEMP ADJ BLDA: 92 MMHG (ref 64–87)
POTASSIUM SERPL-SCNC: 5.5 MMOL/L (ref 3.6–5.5)
PROT SERPL-MCNC: 5.8 G/DL (ref 6–8.2)
RBC # BLD AUTO: 3.08 M/UL (ref 4.7–6.1)
SAO2 % BLDA: 96 % (ref 93–99)
SIGNIFICANT IND 70042: ABNORMAL
SITE SITE: ABNORMAL
SODIUM SERPL-SCNC: 144 MMOL/L (ref 135–145)
SOURCE SOURCE: ABNORMAL
SPECIMEN DRAWN FROM PATIENT: ABNORMAL
VANCOMYCIN TROUGH SERPL-MCNC: 16.5 UG/ML (ref 10–20)
WBC # BLD AUTO: 22.1 K/UL (ref 4.8–10.8)

## 2018-06-08 PROCEDURE — 94640 AIRWAY INHALATION TREATMENT: CPT

## 2018-06-08 PROCEDURE — 82140 ASSAY OF AMMONIA: CPT

## 2018-06-08 PROCEDURE — 700111 HCHG RX REV CODE 636 W/ 250 OVERRIDE (IP): Performed by: SURGERY

## 2018-06-08 PROCEDURE — 770022 HCHG ROOM/CARE - ICU (200)

## 2018-06-08 PROCEDURE — 700102 HCHG RX REV CODE 250 W/ 637 OVERRIDE(OP): Performed by: SURGERY

## 2018-06-08 PROCEDURE — 700105 HCHG RX REV CODE 258: Performed by: SURGERY

## 2018-06-08 PROCEDURE — A9270 NON-COVERED ITEM OR SERVICE: HCPCS | Performed by: SURGERY

## 2018-06-08 PROCEDURE — 700101 HCHG RX REV CODE 250: Performed by: SURGERY

## 2018-06-08 PROCEDURE — 82803 BLOOD GASES ANY COMBINATION: CPT

## 2018-06-08 PROCEDURE — 99292 CRITICAL CARE ADDL 30 MIN: CPT | Performed by: SURGERY

## 2018-06-08 PROCEDURE — 80202 ASSAY OF VANCOMYCIN: CPT

## 2018-06-08 PROCEDURE — 80053 COMPREHEN METABOLIC PANEL: CPT

## 2018-06-08 PROCEDURE — 85025 COMPLETE CBC W/AUTO DIFF WBC: CPT

## 2018-06-08 PROCEDURE — 71045 X-RAY EXAM CHEST 1 VIEW: CPT

## 2018-06-08 PROCEDURE — 99291 CRITICAL CARE FIRST HOUR: CPT | Performed by: SURGERY

## 2018-06-08 PROCEDURE — 700112 HCHG RX REV CODE 229: Performed by: SURGERY

## 2018-06-08 PROCEDURE — 94003 VENT MGMT INPAT SUBQ DAY: CPT

## 2018-06-08 PROCEDURE — 37799 UNLISTED PX VASCULAR SURGERY: CPT

## 2018-06-08 RX ORDER — LACTULOSE 20 G/30ML
30 SOLUTION ORAL 3 TIMES DAILY
Status: DISCONTINUED | OUTPATIENT
Start: 2018-06-08 | End: 2018-06-18

## 2018-06-08 RX ADMIN — IPRATROPIUM BROMIDE AND ALBUTEROL SULFATE 3 ML: .5; 3 SOLUTION RESPIRATORY (INHALATION) at 11:15

## 2018-06-08 RX ADMIN — PROPOFOL 30 MCG/KG/MIN: 10 INJECTION, EMULSION INTRAVENOUS at 00:05

## 2018-06-08 RX ADMIN — CEFEPIME 2 G: 2 INJECTION, POWDER, FOR SOLUTION INTRAMUSCULAR; INTRAVENOUS at 12:50

## 2018-06-08 RX ADMIN — CELECOXIB 200 MG: 200 CAPSULE ORAL at 08:28

## 2018-06-08 RX ADMIN — SODIUM BICARBONATE 3 ML: 84 INJECTION, SOLUTION INTRAVENOUS at 02:40

## 2018-06-08 RX ADMIN — ENOXAPARIN SODIUM 30 MG: 100 INJECTION SUBCUTANEOUS at 21:20

## 2018-06-08 RX ADMIN — CEFEPIME 2 G: 2 INJECTION, POWDER, FOR SOLUTION INTRAMUSCULAR; INTRAVENOUS at 06:04

## 2018-06-08 RX ADMIN — CEFEPIME 2 G: 2 INJECTION, POWDER, FOR SOLUTION INTRAMUSCULAR; INTRAVENOUS at 21:22

## 2018-06-08 RX ADMIN — OXYCODONE HYDROCHLORIDE 15 MG: 5 TABLET ORAL at 14:00

## 2018-06-08 RX ADMIN — SODIUM BICARBONATE 3 ML: 84 INJECTION, SOLUTION INTRAVENOUS at 18:47

## 2018-06-08 RX ADMIN — LACTULOSE 30 ML: 20 SOLUTION ORAL at 14:00

## 2018-06-08 RX ADMIN — METOCLOPRAMIDE 10 MG: 5 INJECTION, SOLUTION INTRAMUSCULAR; INTRAVENOUS at 14:00

## 2018-06-08 RX ADMIN — IPRATROPIUM BROMIDE AND ALBUTEROL SULFATE 3 ML: .5; 3 SOLUTION RESPIRATORY (INHALATION) at 23:04

## 2018-06-08 RX ADMIN — IPRATROPIUM BROMIDE AND ALBUTEROL SULFATE 3 ML: .5; 3 SOLUTION RESPIRATORY (INHALATION) at 14:24

## 2018-06-08 RX ADMIN — POLYETHYLENE GLYCOL 3350 1 PACKET: 17 POWDER, FOR SOLUTION ORAL at 08:28

## 2018-06-08 RX ADMIN — FAMOTIDINE 20 MG: 20 TABLET ORAL at 08:28

## 2018-06-08 RX ADMIN — METOCLOPRAMIDE 10 MG: 5 INJECTION, SOLUTION INTRAMUSCULAR; INTRAVENOUS at 21:20

## 2018-06-08 RX ADMIN — IPRATROPIUM BROMIDE AND ALBUTEROL SULFATE 3 ML: .5; 3 SOLUTION RESPIRATORY (INHALATION) at 06:46

## 2018-06-08 RX ADMIN — DOCUSATE SODIUM 100 MG: 50 LIQUID ORAL at 08:28

## 2018-06-08 RX ADMIN — OXYCODONE HYDROCHLORIDE 15 MG: 5 TABLET ORAL at 08:31

## 2018-06-08 RX ADMIN — PROPOFOL 40 MCG/KG/MIN: 10 INJECTION, EMULSION INTRAVENOUS at 08:29

## 2018-06-08 RX ADMIN — SODIUM BICARBONATE 3 ML: 84 INJECTION, SOLUTION INTRAVENOUS at 14:24

## 2018-06-08 RX ADMIN — VANCOMYCIN HYDROCHLORIDE 2000 MG: 100 INJECTION, POWDER, LYOPHILIZED, FOR SOLUTION INTRAVENOUS at 12:30

## 2018-06-08 RX ADMIN — PROPOFOL 30 MCG/KG/MIN: 10 INJECTION, EMULSION INTRAVENOUS at 21:20

## 2018-06-08 RX ADMIN — IPRATROPIUM BROMIDE AND ALBUTEROL SULFATE 3 ML: .5; 3 SOLUTION RESPIRATORY (INHALATION) at 18:47

## 2018-06-08 RX ADMIN — ENOXAPARIN SODIUM 30 MG: 100 INJECTION SUBCUTANEOUS at 08:28

## 2018-06-08 RX ADMIN — SODIUM BICARBONATE 3 ML: 84 INJECTION, SOLUTION INTRAVENOUS at 06:47

## 2018-06-08 RX ADMIN — ACETAMINOPHEN 650 MG: 325 TABLET, FILM COATED ORAL at 06:04

## 2018-06-08 RX ADMIN — POLYETHYLENE GLYCOL 3350 1 PACKET: 17 POWDER, FOR SOLUTION ORAL at 21:20

## 2018-06-08 RX ADMIN — DOCUSATE SODIUM 100 MG: 50 LIQUID ORAL at 21:20

## 2018-06-08 RX ADMIN — IPRATROPIUM BROMIDE AND ALBUTEROL SULFATE 3 ML: .5; 3 SOLUTION RESPIRATORY (INHALATION) at 02:40

## 2018-06-08 RX ADMIN — PROPOFOL 30 MCG/KG/MIN: 10 INJECTION, EMULSION INTRAVENOUS at 05:00

## 2018-06-08 RX ADMIN — LACTULOSE 30 ML: 20 SOLUTION ORAL at 21:20

## 2018-06-08 RX ADMIN — SODIUM BICARBONATE 3 ML: 84 INJECTION, SOLUTION INTRAVENOUS at 11:15

## 2018-06-08 RX ADMIN — PROPOFOL 35 MCG/KG/MIN: 10 INJECTION, EMULSION INTRAVENOUS at 17:34

## 2018-06-08 RX ADMIN — SENNOSIDES AND DOCUSATE SODIUM 1 TABLET: 8.6; 5 TABLET ORAL at 21:20

## 2018-06-08 RX ADMIN — ACETAMINOPHEN 650 MG: 325 TABLET, FILM COATED ORAL at 00:05

## 2018-06-08 RX ADMIN — SODIUM BICARBONATE 3 ML: 84 INJECTION, SOLUTION INTRAVENOUS at 23:04

## 2018-06-08 RX ADMIN — METOCLOPRAMIDE 10 MG: 5 INJECTION, SOLUTION INTRAMUSCULAR; INTRAVENOUS at 08:28

## 2018-06-08 RX ADMIN — FAMOTIDINE 20 MG: 10 INJECTION, SOLUTION INTRAVENOUS at 21:20

## 2018-06-08 RX ADMIN — PROPOFOL 25 MCG/KG/MIN: 10 INJECTION, EMULSION INTRAVENOUS at 13:35
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
## 2018-06-08 NOTE — CARE PLAN
Problem: Safety  Goal: Will remain free from injury  Educated pt of fall precautions. Reminded to call for assistance and not to get out of bed without staff present. Bed alarm on. Pt verbalized understanding.     Problem: Pain Management  Goal: Pain level will decrease to patient's comfort goal  Will continue to monitor pain throughout this shift. PRN meds to be given if needed    Problem: Skin Integrity  Goal: Risk for impaired skin integrity will decrease  Turn q2h to prevent skin breakdown.  Skin assessed.

## 2018-06-08 NOTE — CARE PLAN
Problem: Respiratory:  Goal: Respiratory status will improve  Q2 hour oral care or PRN. Collaboration with RT in place    Problem: Urinary Elimination:  Goal: Ability to reestablish a normal urinary elimination pattern will improve  Roberts catheter in place. Output documented in flowsheets.

## 2018-06-08 NOTE — RESPIRATORY CARE
COPD EDUCATION by COPD CLINICAL EDUCATOR  6/8/2018 at 9:24 AM by Kaitlin Estrada     Patient reviewed by COPD education team. Patient does not qualify for COPD program.

## 2018-06-08 NOTE — CARE PLAN
Problem: Ventilation Defect:  Goal: Ability to achieve and maintain unassisted ventilation or tolerate decreased levels of ventilator support    Intervention: Support and monitor invasive and noninvasive mechanical ventilation  Adult Ventilation Update    Total Vent Days: 8  Patient Lines/Drains/Airways Status    Active Airway     Name: Placement date: Placement time: Site: Days:    Airway Group Portex Trach Tracheostomy 8.0 06/07/18   1055   Tracheostomy   2              Barriers to Wean: Other (Comments) (APRV) (06/06/18 0658)    Cough: Productive (06/08/18 0241)  Sputum Amount: Moderate (06/08/18 0241)  Sputum Color: Tan;Yellow;Brown (06/08/18 0241)  Sputum Consistency: Thick (06/08/18 0241)    Mobility  Activity Performed: Unable to mobilize (06/07/18 2000)  Reason Not Mobilized: Unstable condition (06/07/18 2000)    Events/Summary/Plan: decreased fio2 to 80% (06/08/18 0241)

## 2018-06-08 NOTE — PROGRESS NOTES
"Patient seen and examined  Still critically ill    Blood pressure 138/100, pulse 90, temperature 37.1 °C (98.7 °F), resp. rate (!) 33, height 1.88 m (6' 2\"), weight (!) 129 kg (284 lb 6.3 oz), SpO2 97 %.    Recent Labs      06/06/18   0510  06/07/18   0440   WBC  14.8*  17.6*   RBC  2.94*  3.03*   HEMOGLOBIN  9.1*  9.4*   HEMATOCRIT  28.5*  29.5*   MCV  96.9  97.4   MCH  31.0  31.0   MCHC  31.9*  31.9*   RDW  48.2  49.8   PLATELETCT  232  270   MPV  11.5  11.2       Plan:  DVT Prophylaxis- TEDS/SCDs  Weight Bearing Status-WBAT  Sling  No surgery required          "

## 2018-06-09 ENCOUNTER — APPOINTMENT (OUTPATIENT)
Dept: RADIOLOGY | Facility: MEDICAL CENTER | Age: 63
DRG: 003 | End: 2018-06-09
Attending: SURGERY
Payer: COMMERCIAL

## 2018-06-09 PROBLEM — J32.9 SINUSITIS: Status: ACTIVE | Noted: 2018-06-09

## 2018-06-09 PROBLEM — R41.82 ALTERED MENTAL STATUS, UNSPECIFIED: Status: ACTIVE | Noted: 2018-06-09

## 2018-06-09 LAB
ACTION RANGE TRIGGERED IACRT: NO
ALBUMIN SERPL BCP-MCNC: 2.4 G/DL (ref 3.2–4.9)
ALBUMIN/GLOB SERPL: 0.7 G/DL
ALP SERPL-CCNC: 111 U/L (ref 30–99)
ALT SERPL-CCNC: 143 U/L (ref 2–50)
AMMONIA PLAS-SCNC: 118 UMOL/L (ref 11–45)
ANION GAP SERPL CALC-SCNC: 6 MMOL/L (ref 0–11.9)
AST SERPL-CCNC: 163 U/L (ref 12–45)
BASE EXCESS BLDA CALC-SCNC: 3 MMOL/L (ref -4–3)
BASOPHILS # BLD AUTO: 0.9 % (ref 0–1.8)
BASOPHILS # BLD: 0.21 K/UL (ref 0–0.12)
BILIRUB SERPL-MCNC: 2.1 MG/DL (ref 0.1–1.5)
BODY TEMPERATURE: ABNORMAL DEGREES
BUN SERPL-MCNC: 52 MG/DL (ref 8–22)
CALCIUM SERPL-MCNC: 8.3 MG/DL (ref 8.5–10.5)
CHLORIDE SERPL-SCNC: 110 MMOL/L (ref 96–112)
CO2 BLDA-SCNC: 30 MMOL/L (ref 20–33)
CO2 SERPL-SCNC: 28 MMOL/L (ref 20–33)
CREAT SERPL-MCNC: 0.98 MG/DL (ref 0.5–1.4)
EOSINOPHIL # BLD AUTO: 0.21 K/UL (ref 0–0.51)
EOSINOPHIL NFR BLD: 0.9 % (ref 0–6.9)
ERYTHROCYTE [DISTWIDTH] IN BLOOD BY AUTOMATED COUNT: 53.6 FL (ref 35.9–50)
GLOBULIN SER CALC-MCNC: 3.5 G/DL (ref 1.9–3.5)
GLUCOSE BLD-MCNC: 134 MG/DL (ref 65–99)
GLUCOSE SERPL-MCNC: 144 MG/DL (ref 65–99)
HCO3 BLDA-SCNC: 28.3 MMOL/L (ref 17–25)
HCT VFR BLD AUTO: 30.1 % (ref 42–52)
HGB BLD-MCNC: 9.1 G/DL (ref 14–18)
INR PPP: 1.67 (ref 0.87–1.13)
INST. QUALIFIED PATIENT IIQPT: YES
LYMPHOCYTES # BLD AUTO: 0.23 K/UL (ref 1–4.8)
LYMPHOCYTES NFR BLD: 1 % (ref 22–41)
MANUAL DIFF BLD: NORMAL
MCH RBC QN AUTO: 30.4 PG (ref 27–33)
MCHC RBC AUTO-ENTMCNC: 30.2 G/DL (ref 33.7–35.3)
MCV RBC AUTO: 100.7 FL (ref 81.4–97.8)
MONOCYTES # BLD AUTO: 1.11 K/UL (ref 0–0.85)
MONOCYTES NFR BLD AUTO: 4.8 % (ref 0–13.4)
MORPHOLOGY BLD-IMP: NORMAL
MYELOCYTES NFR BLD MANUAL: 2.9 %
NEUTROPHILS # BLD AUTO: 20.67 K/UL (ref 1.82–7.42)
NEUTROPHILS NFR BLD: 89.5 % (ref 44–72)
NRBC # BLD AUTO: 0.04 K/UL
NRBC BLD-RTO: 0.2 /100 WBC
O2/TOTAL GAS SETTING VFR VENT: 65 %
PCO2 BLDA: 45.5 MMHG (ref 26–37)
PCO2 TEMP ADJ BLDA: 46.9 MMHG (ref 26–37)
PH BLDA: 7.4 [PH] (ref 7.4–7.5)
PH TEMP ADJ BLDA: 7.39 [PH] (ref 7.4–7.5)
PLATELET # BLD AUTO: 308 K/UL (ref 164–446)
PLATELET BLD QL SMEAR: NORMAL
PMV BLD AUTO: 11.3 FL (ref 9–12.9)
PO2 BLDA: 88 MMHG (ref 64–87)
PO2 TEMP ADJ BLDA: 92 MMHG (ref 64–87)
POTASSIUM SERPL-SCNC: 4.9 MMOL/L (ref 3.6–5.5)
PROT SERPL-MCNC: 5.9 G/DL (ref 6–8.2)
PROTHROMBIN TIME: 19.4 SEC (ref 12–14.6)
RBC # BLD AUTO: 2.99 M/UL (ref 4.7–6.1)
RBC BLD AUTO: NORMAL
SAO2 % BLDA: 97 % (ref 93–99)
SODIUM SERPL-SCNC: 144 MMOL/L (ref 135–145)
SPECIMEN DRAWN FROM PATIENT: ABNORMAL
TRIGL SERPL-MCNC: 500 MG/DL (ref 0–149)
WBC # BLD AUTO: 23.1 K/UL (ref 4.8–10.8)

## 2018-06-09 PROCEDURE — 700105 HCHG RX REV CODE 258: Performed by: SURGERY

## 2018-06-09 PROCEDURE — 0B968ZZ DRAINAGE OF RIGHT LOWER LOBE BRONCHUS, VIA NATURAL OR ARTIFICIAL OPENING ENDOSCOPIC: ICD-10-PCS | Performed by: SURGERY

## 2018-06-09 PROCEDURE — A9270 NON-COVERED ITEM OR SERVICE: HCPCS | Performed by: SURGERY

## 2018-06-09 PROCEDURE — 85007 BL SMEAR W/DIFF WBC COUNT: CPT

## 2018-06-09 PROCEDURE — 700111 HCHG RX REV CODE 636 W/ 250 OVERRIDE (IP): Performed by: SURGERY

## 2018-06-09 PROCEDURE — 85027 COMPLETE CBC AUTOMATED: CPT

## 2018-06-09 PROCEDURE — 70450 CT HEAD/BRAIN W/O DYE: CPT

## 2018-06-09 PROCEDURE — 94003 VENT MGMT INPAT SUBQ DAY: CPT

## 2018-06-09 PROCEDURE — 31624 DX BRONCHOSCOPE/LAVAGE: CPT | Performed by: SURGERY

## 2018-06-09 PROCEDURE — 85610 PROTHROMBIN TIME: CPT

## 2018-06-09 PROCEDURE — 770022 HCHG ROOM/CARE - ICU (200)

## 2018-06-09 PROCEDURE — 84478 ASSAY OF TRIGLYCERIDES: CPT

## 2018-06-09 PROCEDURE — 0B9D8ZZ DRAINAGE OF RIGHT MIDDLE LUNG LOBE, VIA NATURAL OR ARTIFICIAL OPENING ENDOSCOPIC: ICD-10-PCS | Performed by: SURGERY

## 2018-06-09 PROCEDURE — 82962 GLUCOSE BLOOD TEST: CPT

## 2018-06-09 PROCEDURE — 36600 WITHDRAWAL OF ARTERIAL BLOOD: CPT

## 2018-06-09 PROCEDURE — 82803 BLOOD GASES ANY COMBINATION: CPT

## 2018-06-09 PROCEDURE — 0B9B8ZZ DRAINAGE OF LEFT LOWER LOBE BRONCHUS, VIA NATURAL OR ARTIFICIAL OPENING ENDOSCOPIC: ICD-10-PCS | Performed by: SURGERY

## 2018-06-09 PROCEDURE — 82140 ASSAY OF AMMONIA: CPT

## 2018-06-09 PROCEDURE — 94640 AIRWAY INHALATION TREATMENT: CPT

## 2018-06-09 PROCEDURE — 700102 HCHG RX REV CODE 250 W/ 637 OVERRIDE(OP): Performed by: SURGERY

## 2018-06-09 PROCEDURE — 0B998ZZ DRAINAGE OF LINGULA BRONCHUS, VIA NATURAL OR ARTIFICIAL OPENING ENDOSCOPIC: ICD-10-PCS | Performed by: SURGERY

## 2018-06-09 PROCEDURE — 302977 HCHG BRONCHOSCOPY PROC-THERAPEUTIC

## 2018-06-09 PROCEDURE — P9047 ALBUMIN (HUMAN), 25%, 50ML: HCPCS | Performed by: SURGERY

## 2018-06-09 PROCEDURE — 99152 MOD SED SAME PHYS/QHP 5/>YRS: CPT

## 2018-06-09 PROCEDURE — 700101 HCHG RX REV CODE 250: Performed by: SURGERY

## 2018-06-09 PROCEDURE — 700112 HCHG RX REV CODE 229: Performed by: SURGERY

## 2018-06-09 PROCEDURE — 71045 X-RAY EXAM CHEST 1 VIEW: CPT

## 2018-06-09 PROCEDURE — 99291 CRITICAL CARE FIRST HOUR: CPT | Mod: 25 | Performed by: SURGERY

## 2018-06-09 PROCEDURE — 80053 COMPREHEN METABOLIC PANEL: CPT

## 2018-06-09 PROCEDURE — 0B9G8ZZ DRAINAGE OF LEFT UPPER LUNG LOBE, VIA NATURAL OR ARTIFICIAL OPENING ENDOSCOPIC: ICD-10-PCS | Performed by: SURGERY

## 2018-06-09 RX ORDER — CEFAZOLIN SODIUM 2 G/100ML
2 INJECTION, SOLUTION INTRAVENOUS EVERY 8 HOURS
Status: DISCONTINUED | OUTPATIENT
Start: 2018-06-09 | End: 2018-06-09

## 2018-06-09 RX ORDER — ENALAPRILAT 1.25 MG/ML
1.25 INJECTION INTRAVENOUS EVERY 6 HOURS PRN
Status: DISCONTINUED | OUTPATIENT
Start: 2018-06-09 | End: 2018-06-10

## 2018-06-09 RX ORDER — FUROSEMIDE 10 MG/ML
40 INJECTION INTRAMUSCULAR; INTRAVENOUS 2 TIMES DAILY
Status: COMPLETED | OUTPATIENT
Start: 2018-06-09 | End: 2018-06-10

## 2018-06-09 RX ORDER — MIDAZOLAM HYDROCHLORIDE 1 MG/ML
1-5 INJECTION INTRAMUSCULAR; INTRAVENOUS
Status: DISCONTINUED | OUTPATIENT
Start: 2018-06-09 | End: 2018-06-10

## 2018-06-09 RX ORDER — ALBUMIN (HUMAN) 12.5 G/50ML
50 SOLUTION INTRAVENOUS ONCE
Status: COMPLETED | OUTPATIENT
Start: 2018-06-09 | End: 2018-06-09

## 2018-06-09 RX ORDER — LABETALOL HYDROCHLORIDE 5 MG/ML
10 INJECTION, SOLUTION INTRAVENOUS EVERY 4 HOURS PRN
Status: DISCONTINUED | OUTPATIENT
Start: 2018-06-09 | End: 2018-07-04 | Stop reason: HOSPADM

## 2018-06-09 RX ORDER — ROCURONIUM BROMIDE 10 MG/ML
70 INJECTION, SOLUTION INTRAVENOUS ONCE
Status: COMPLETED | OUTPATIENT
Start: 2018-06-09 | End: 2018-06-09

## 2018-06-09 RX ADMIN — DOCUSATE SODIUM 100 MG: 50 LIQUID ORAL at 21:40

## 2018-06-09 RX ADMIN — SODIUM BICARBONATE 3 ML: 84 INJECTION, SOLUTION INTRAVENOUS at 22:33

## 2018-06-09 RX ADMIN — LABETALOL HYDROCHLORIDE 10 MG: 5 INJECTION INTRAVENOUS at 11:16

## 2018-06-09 RX ADMIN — CEFEPIME 2 G: 2 INJECTION, POWDER, FOR SOLUTION INTRAMUSCULAR; INTRAVENOUS at 05:26

## 2018-06-09 RX ADMIN — LACTULOSE 30 ML: 20 SOLUTION ORAL at 08:24

## 2018-06-09 RX ADMIN — OXYCODONE HYDROCHLORIDE 15 MG: 5 TABLET ORAL at 21:40

## 2018-06-09 RX ADMIN — PROPOFOL 40 MCG/KG/MIN: 10 INJECTION, EMULSION INTRAVENOUS at 05:00

## 2018-06-09 RX ADMIN — FAMOTIDINE 20 MG: 20 TABLET ORAL at 08:18

## 2018-06-09 RX ADMIN — PIPERACILLIN SODIUM AND TAZOBACTAM SODIUM 3.38 G: 3; .375 INJECTION, POWDER, FOR SOLUTION INTRAVENOUS at 18:00

## 2018-06-09 RX ADMIN — SODIUM BICARBONATE 3 ML: 84 INJECTION, SOLUTION INTRAVENOUS at 14:08

## 2018-06-09 RX ADMIN — LACTULOSE 30 ML: 20 SOLUTION ORAL at 15:30

## 2018-06-09 RX ADMIN — METOCLOPRAMIDE 10 MG: 5 INJECTION, SOLUTION INTRAMUSCULAR; INTRAVENOUS at 08:19

## 2018-06-09 RX ADMIN — PROPOFOL 40 MCG/KG/MIN: 10 INJECTION, EMULSION INTRAVENOUS at 02:00

## 2018-06-09 RX ADMIN — ENOXAPARIN SODIUM 30 MG: 100 INJECTION SUBCUTANEOUS at 21:40

## 2018-06-09 RX ADMIN — ENOXAPARIN SODIUM 30 MG: 100 INJECTION SUBCUTANEOUS at 08:18

## 2018-06-09 RX ADMIN — POLYETHYLENE GLYCOL 3350 1 PACKET: 17 POWDER, FOR SOLUTION ORAL at 21:40

## 2018-06-09 RX ADMIN — RIFAXIMIN 550 MG: 550 TABLET ORAL at 15:44

## 2018-06-09 RX ADMIN — DOCUSATE SODIUM 100 MG: 50 LIQUID ORAL at 08:18

## 2018-06-09 RX ADMIN — OXYCODONE HYDROCHLORIDE 15 MG: 5 TABLET ORAL at 07:31

## 2018-06-09 RX ADMIN — IPRATROPIUM BROMIDE AND ALBUTEROL SULFATE 3 ML: .5; 3 SOLUTION RESPIRATORY (INHALATION) at 06:34

## 2018-06-09 RX ADMIN — LACTULOSE 30 ML: 20 SOLUTION ORAL at 21:40

## 2018-06-09 RX ADMIN — FUROSEMIDE 40 MG: 10 INJECTION, SOLUTION INTRAMUSCULAR; INTRAVENOUS at 18:00

## 2018-06-09 RX ADMIN — OXYCODONE HYDROCHLORIDE 15 MG: 5 TABLET ORAL at 15:55

## 2018-06-09 RX ADMIN — IPRATROPIUM BROMIDE AND ALBUTEROL SULFATE 3 ML: .5; 3 SOLUTION RESPIRATORY (INHALATION) at 03:05

## 2018-06-09 RX ADMIN — SENNOSIDES AND DOCUSATE SODIUM 1 TABLET: 8.6; 5 TABLET ORAL at 21:40

## 2018-06-09 RX ADMIN — ALBUMIN (HUMAN) 50 G: 12.5 SOLUTION INTRAVENOUS at 15:42

## 2018-06-09 RX ADMIN — IPRATROPIUM BROMIDE AND ALBUTEROL SULFATE 3 ML: .5; 3 SOLUTION RESPIRATORY (INHALATION) at 11:48

## 2018-06-09 RX ADMIN — SODIUM BICARBONATE 3 ML: 84 INJECTION, SOLUTION INTRAVENOUS at 18:18

## 2018-06-09 RX ADMIN — SODIUM BICARBONATE 3 ML: 84 INJECTION, SOLUTION INTRAVENOUS at 03:05

## 2018-06-09 RX ADMIN — FAMOTIDINE 20 MG: 20 TABLET ORAL at 21:40

## 2018-06-09 RX ADMIN — CELECOXIB 200 MG: 200 CAPSULE ORAL at 08:18

## 2018-06-09 RX ADMIN — PIPERACILLIN SODIUM AND TAZOBACTAM SODIUM 3.38 G: 3; .375 INJECTION, POWDER, FOR SOLUTION INTRAVENOUS at 15:43

## 2018-06-09 RX ADMIN — IPRATROPIUM BROMIDE AND ALBUTEROL SULFATE 3 ML: .5; 3 SOLUTION RESPIRATORY (INHALATION) at 18:18

## 2018-06-09 RX ADMIN — ROCURONIUM BROMIDE 70 MG: 10 INJECTION, SOLUTION INTRAVENOUS at 11:21

## 2018-06-09 RX ADMIN — LABETALOL HYDROCHLORIDE 10 MG: 5 INJECTION INTRAVENOUS at 21:41

## 2018-06-09 RX ADMIN — POLYETHYLENE GLYCOL 3350 1 PACKET: 17 POWDER, FOR SOLUTION ORAL at 08:18

## 2018-06-09 RX ADMIN — IPRATROPIUM BROMIDE AND ALBUTEROL SULFATE 3 ML: .5; 3 SOLUTION RESPIRATORY (INHALATION) at 22:33

## 2018-06-09 RX ADMIN — SODIUM BICARBONATE 2 ML: 84 INJECTION, SOLUTION INTRAVENOUS at 06:34

## 2018-06-09 RX ADMIN — RIFAXIMIN 550 MG: 550 TABLET ORAL at 22:30

## 2018-06-09 RX ADMIN — SODIUM BICARBONATE 3 ML: 84 INJECTION, SOLUTION INTRAVENOUS at 11:48

## 2018-06-09 RX ADMIN — IPRATROPIUM BROMIDE AND ALBUTEROL SULFATE 3 ML: .5; 3 SOLUTION RESPIRATORY (INHALATION) at 14:08

## 2018-06-09 NOTE — CARE PLAN
Problem: Ventilation Defect:  Goal: Ability to achieve and maintain unassisted ventilation or tolerate decreased levels of ventilator support    Intervention: Monitor ventilator weaning response  Vent day 8, trach day 2. Pt on APRV :P-high: 26, P-low:0, T-high: 4.0, T-low 0.6. Pt FiO2 titrated to 65% this shift. No wean. Small/mod thick brown bloody secretions noted during shift. Portex #8.0 trach.
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
## 2018-06-10 ENCOUNTER — APPOINTMENT (OUTPATIENT)
Dept: RADIOLOGY | Facility: MEDICAL CENTER | Age: 63
DRG: 003 | End: 2018-06-10
Attending: SURGERY
Payer: COMMERCIAL

## 2018-06-10 LAB
ACTION RANGE TRIGGERED IACRT: YES
ALBUMIN SERPL BCP-MCNC: 2.8 G/DL (ref 3.2–4.9)
ALBUMIN/GLOB SERPL: 0.8 G/DL
ALP SERPL-CCNC: 117 U/L (ref 30–99)
ALT SERPL-CCNC: 210 U/L (ref 2–50)
AMMONIA PLAS-SCNC: 63 UMOL/L (ref 11–45)
AMMONIA PLAS-SCNC: 63 UMOL/L (ref 11–45)
ANION GAP SERPL CALC-SCNC: 6 MMOL/L (ref 0–11.9)
ANION GAP SERPL CALC-SCNC: 7 MMOL/L (ref 0–11.9)
AST SERPL-CCNC: 316 U/L (ref 12–45)
BASE EXCESS BLDA CALC-SCNC: 8 MMOL/L (ref -4–3)
BASOPHILS # BLD AUTO: 0 % (ref 0–1.8)
BASOPHILS # BLD: 0 K/UL (ref 0–0.12)
BILIRUB SERPL-MCNC: 2.4 MG/DL (ref 0.1–1.5)
BODY TEMPERATURE: ABNORMAL DEGREES
BUN SERPL-MCNC: 68 MG/DL (ref 8–22)
BUN SERPL-MCNC: 69 MG/DL (ref 8–22)
CALCIUM SERPL-MCNC: 8.4 MG/DL (ref 8.5–10.5)
CALCIUM SERPL-MCNC: 8.5 MG/DL (ref 8.5–10.5)
CHLORIDE SERPL-SCNC: 111 MMOL/L (ref 96–112)
CHLORIDE SERPL-SCNC: 112 MMOL/L (ref 96–112)
CO2 BLDA-SCNC: 35 MMOL/L (ref 20–33)
CO2 SERPL-SCNC: 30 MMOL/L (ref 20–33)
CO2 SERPL-SCNC: 32 MMOL/L (ref 20–33)
CREAT SERPL-MCNC: 1.36 MG/DL (ref 0.5–1.4)
CREAT SERPL-MCNC: 1.42 MG/DL (ref 0.5–1.4)
EOSINOPHIL # BLD AUTO: 0 K/UL (ref 0–0.51)
EOSINOPHIL NFR BLD: 0 % (ref 0–6.9)
ERYTHROCYTE [DISTWIDTH] IN BLOOD BY AUTOMATED COUNT: 54.7 FL (ref 35.9–50)
GLOBULIN SER CALC-MCNC: 3.5 G/DL (ref 1.9–3.5)
GLUCOSE SERPL-MCNC: 136 MG/DL (ref 65–99)
GLUCOSE SERPL-MCNC: 141 MG/DL (ref 65–99)
HCO3 BLDA-SCNC: 33.3 MMOL/L (ref 17–25)
HCT VFR BLD AUTO: 28.6 % (ref 42–52)
HGB BLD-MCNC: 8.7 G/DL (ref 14–18)
INST. QUALIFIED PATIENT IIQPT: YES
LYMPHOCYTES # BLD AUTO: 1.64 K/UL (ref 1–4.8)
LYMPHOCYTES NFR BLD: 7 % (ref 22–41)
MANUAL DIFF BLD: NORMAL
MCH RBC QN AUTO: 30.7 PG (ref 27–33)
MCHC RBC AUTO-ENTMCNC: 30.4 G/DL (ref 33.7–35.3)
MCV RBC AUTO: 101.1 FL (ref 81.4–97.8)
MONOCYTES # BLD AUTO: 1.24 K/UL (ref 0–0.85)
MONOCYTES NFR BLD AUTO: 5.3 % (ref 0–13.4)
MORPHOLOGY BLD-IMP: NORMAL
MYELOCYTES NFR BLD MANUAL: 0.9 %
NEUTROPHILS # BLD AUTO: 20.31 K/UL (ref 1.82–7.42)
NEUTROPHILS NFR BLD: 86.8 % (ref 44–72)
NRBC # BLD AUTO: 0.06 K/UL
NRBC BLD-RTO: 0.3 /100 WBC
O2/TOTAL GAS SETTING VFR VENT: 70 %
PCO2 BLDA: 52.4 MMHG (ref 26–37)
PCO2 TEMP ADJ BLDA: 55 MMHG (ref 26–37)
PH BLDA: 7.41 [PH] (ref 7.4–7.5)
PH TEMP ADJ BLDA: 7.4 [PH] (ref 7.4–7.5)
PLATELET # BLD AUTO: 303 K/UL (ref 164–446)
PLATELET BLD QL SMEAR: NORMAL
PMV BLD AUTO: 11.4 FL (ref 9–12.9)
PO2 BLDA: 133 MMHG (ref 64–87)
PO2 TEMP ADJ BLDA: 140 MMHG (ref 64–87)
POLYCHROMASIA BLD QL SMEAR: NORMAL
POTASSIUM SERPL-SCNC: 4.2 MMOL/L (ref 3.6–5.5)
POTASSIUM SERPL-SCNC: 4.8 MMOL/L (ref 3.6–5.5)
PROT SERPL-MCNC: 6.3 G/DL (ref 6–8.2)
RBC # BLD AUTO: 2.83 M/UL (ref 4.7–6.1)
RBC BLD AUTO: PRESENT
SAO2 % BLDA: 99 % (ref 93–99)
SODIUM SERPL-SCNC: 149 MMOL/L (ref 135–145)
SODIUM SERPL-SCNC: 149 MMOL/L (ref 135–145)
SPECIMEN DRAWN FROM PATIENT: ABNORMAL
WBC # BLD AUTO: 23.4 K/UL (ref 4.8–10.8)

## 2018-06-10 PROCEDURE — 80048 BASIC METABOLIC PNL TOTAL CA: CPT

## 2018-06-10 PROCEDURE — 85027 COMPLETE CBC AUTOMATED: CPT

## 2018-06-10 PROCEDURE — 82803 BLOOD GASES ANY COMBINATION: CPT | Mod: 91

## 2018-06-10 PROCEDURE — 80053 COMPREHEN METABOLIC PANEL: CPT

## 2018-06-10 PROCEDURE — 74176 CT ABD & PELVIS W/O CONTRAST: CPT

## 2018-06-10 PROCEDURE — 700111 HCHG RX REV CODE 636 W/ 250 OVERRIDE (IP): Performed by: SURGERY

## 2018-06-10 PROCEDURE — 700105 HCHG RX REV CODE 258: Performed by: SURGERY

## 2018-06-10 PROCEDURE — 94640 AIRWAY INHALATION TREATMENT: CPT

## 2018-06-10 PROCEDURE — 85007 BL SMEAR W/DIFF WBC COUNT: CPT

## 2018-06-10 PROCEDURE — A9270 NON-COVERED ITEM OR SERVICE: HCPCS | Performed by: SURGERY

## 2018-06-10 PROCEDURE — 94003 VENT MGMT INPAT SUBQ DAY: CPT

## 2018-06-10 PROCEDURE — P9047 ALBUMIN (HUMAN), 25%, 50ML: HCPCS | Performed by: SURGERY

## 2018-06-10 PROCEDURE — 700102 HCHG RX REV CODE 250 W/ 637 OVERRIDE(OP): Performed by: SURGERY

## 2018-06-10 PROCEDURE — 71045 X-RAY EXAM CHEST 1 VIEW: CPT

## 2018-06-10 PROCEDURE — 82140 ASSAY OF AMMONIA: CPT | Mod: 91

## 2018-06-10 PROCEDURE — 700112 HCHG RX REV CODE 229: Performed by: SURGERY

## 2018-06-10 PROCEDURE — 770022 HCHG ROOM/CARE - ICU (200)

## 2018-06-10 PROCEDURE — 36600 WITHDRAWAL OF ARTERIAL BLOOD: CPT

## 2018-06-10 PROCEDURE — 99291 CRITICAL CARE FIRST HOUR: CPT | Performed by: SURGERY

## 2018-06-10 PROCEDURE — 76705 ECHO EXAM OF ABDOMEN: CPT

## 2018-06-10 PROCEDURE — 700101 HCHG RX REV CODE 250: Performed by: SURGERY

## 2018-06-10 RX ORDER — SODIUM CHLORIDE, SODIUM LACTATE, POTASSIUM CHLORIDE, CALCIUM CHLORIDE 600; 310; 30; 20 MG/100ML; MG/100ML; MG/100ML; MG/100ML
INJECTION, SOLUTION INTRAVENOUS CONTINUOUS
Status: DISCONTINUED | OUTPATIENT
Start: 2018-06-10 | End: 2018-06-11

## 2018-06-10 RX ORDER — ALBUMIN (HUMAN) 12.5 G/50ML
50 SOLUTION INTRAVENOUS ONCE
Status: COMPLETED | OUTPATIENT
Start: 2018-06-10 | End: 2018-06-10

## 2018-06-10 RX ADMIN — SODIUM BICARBONATE 3 ML: 84 INJECTION, SOLUTION INTRAVENOUS at 15:11

## 2018-06-10 RX ADMIN — PIPERACILLIN SODIUM AND TAZOBACTAM SODIUM 3.38 G: 3; .375 INJECTION, POWDER, FOR SOLUTION INTRAVENOUS at 04:15

## 2018-06-10 RX ADMIN — SODIUM BICARBONATE 3 ML: 84 INJECTION, SOLUTION INTRAVENOUS at 11:04

## 2018-06-10 RX ADMIN — FUROSEMIDE 40 MG: 10 INJECTION, SOLUTION INTRAMUSCULAR; INTRAVENOUS at 03:51

## 2018-06-10 RX ADMIN — RIFAXIMIN 550 MG: 550 TABLET ORAL at 20:09

## 2018-06-10 RX ADMIN — ENOXAPARIN SODIUM 30 MG: 100 INJECTION SUBCUTANEOUS at 11:55

## 2018-06-10 RX ADMIN — RIFAXIMIN 550 MG: 550 TABLET ORAL at 11:56

## 2018-06-10 RX ADMIN — ENOXAPARIN SODIUM 30 MG: 100 INJECTION SUBCUTANEOUS at 20:09

## 2018-06-10 RX ADMIN — LACTULOSE 30 ML: 20 SOLUTION ORAL at 11:57

## 2018-06-10 RX ADMIN — OXYCODONE HYDROCHLORIDE 15 MG: 5 TABLET ORAL at 16:30

## 2018-06-10 RX ADMIN — SODIUM BICARBONATE 3 ML: 84 INJECTION, SOLUTION INTRAVENOUS at 02:37

## 2018-06-10 RX ADMIN — PIPERACILLIN SODIUM AND TAZOBACTAM SODIUM 3.38 G: 3; .375 INJECTION, POWDER, FOR SOLUTION INTRAVENOUS at 20:09

## 2018-06-10 RX ADMIN — SODIUM BICARBONATE 3 ML: 84 INJECTION, SOLUTION INTRAVENOUS at 18:57

## 2018-06-10 RX ADMIN — DOCUSATE SODIUM 100 MG: 50 LIQUID ORAL at 11:49

## 2018-06-10 RX ADMIN — FAMOTIDINE 20 MG: 20 TABLET ORAL at 11:49

## 2018-06-10 RX ADMIN — IPRATROPIUM BROMIDE AND ALBUTEROL SULFATE 3 ML: .5; 3 SOLUTION RESPIRATORY (INHALATION) at 18:57

## 2018-06-10 RX ADMIN — IPRATROPIUM BROMIDE AND ALBUTEROL SULFATE 3 ML: .5; 3 SOLUTION RESPIRATORY (INHALATION) at 02:37

## 2018-06-10 RX ADMIN — SODIUM BICARBONATE 3 ML: 84 INJECTION, SOLUTION INTRAVENOUS at 22:57

## 2018-06-10 RX ADMIN — ALBUMIN (HUMAN) 50 G: 12.5 SOLUTION INTRAVENOUS at 11:48

## 2018-06-10 RX ADMIN — IPRATROPIUM BROMIDE AND ALBUTEROL SULFATE 3 ML: .5; 3 SOLUTION RESPIRATORY (INHALATION) at 15:11

## 2018-06-10 RX ADMIN — SODIUM CHLORIDE, POTASSIUM CHLORIDE, SODIUM LACTATE AND CALCIUM CHLORIDE: 600; 310; 30; 20 INJECTION, SOLUTION INTRAVENOUS at 15:40

## 2018-06-10 RX ADMIN — IPRATROPIUM BROMIDE AND ALBUTEROL SULFATE 3 ML: .5; 3 SOLUTION RESPIRATORY (INHALATION) at 06:54

## 2018-06-10 RX ADMIN — LACTULOSE 30 ML: 20 SOLUTION ORAL at 20:08

## 2018-06-10 RX ADMIN — LACTULOSE 30 ML: 20 SOLUTION ORAL at 16:17

## 2018-06-10 RX ADMIN — FAMOTIDINE 20 MG: 20 TABLET ORAL at 20:09

## 2018-06-10 RX ADMIN — PIPERACILLIN SODIUM AND TAZOBACTAM SODIUM 3.38 G: 3; .375 INJECTION, POWDER, FOR SOLUTION INTRAVENOUS at 13:37

## 2018-06-10 RX ADMIN — SODIUM BICARBONATE 2 ML: 84 INJECTION, SOLUTION INTRAVENOUS at 06:56

## 2018-06-10 RX ADMIN — IPRATROPIUM BROMIDE AND ALBUTEROL SULFATE 3 ML: .5; 3 SOLUTION RESPIRATORY (INHALATION) at 11:03

## 2018-06-10 RX ADMIN — IPRATROPIUM BROMIDE AND ALBUTEROL SULFATE 3 ML: .5; 3 SOLUTION RESPIRATORY (INHALATION) at 22:57

## 2018-06-10 NOTE — CARE PLAN
Problem: Pain Management  Goal: Pain level will decrease to patient's comfort goal  Pt medicated per MAR and active MD order    Problem: Mobility  Goal: Risk for activity intolerance will decrease  Pt currently unstable for mobility, will continue to assess

## 2018-06-10 NOTE — CARE PLAN
Problem: Ventilation Defect:  Goal: Ability to achieve and maintain unassisted ventilation or tolerate decreased levels of ventilator support    Intervention: Support and monitor invasive and noninvasive mechanical ventilation  Adult Ventilation Update    Total Vent Days: 10    Patient Lines/Drains/Airways Status    Active Airway     Name: Placement date: Placement time: Site: Days:    Airway Group Portex Trach Tracheostomy 8.0 06/07/18   1055   Tracheostomy   4              APRV 28/0 4/.6           Plateau Pressure (Q Shift): 28 (06/09/18 0634)  Static Compliance (ml / cm H2O): 55 (06/10/18 1511)      Events/Summary/Plan:  No changes made, will continue to monitor.

## 2018-06-10 NOTE — CARE PLAN
Problem: Ventilation Defect:  Goal: Ability to achieve and maintain unassisted ventilation or tolerate decreased levels of ventilator support    Intervention: Support and monitor invasive and noninvasive mechanical ventilation  Adult Ventilation Update    Total Vent Days: 10    Patient Lines/Drains/Airways Status    Active Airway     Name: Placement date: Placement time: Site: Days:    Airway Group Portex Trach Tracheostomy 8.0 06/07/18   1055   Tracheostomy   4              Barriers to Wean: FiO2 >60% or PEEP >10 CM H2O (06/09/18 0800)    Cough: Productive (06/10/18 0400)  Sputum Amount: Small (06/10/18 0400)  Sputum Color: Tan;Yellow (06/10/18 0400)  Sputum Consistency: Thick (06/10/18 0400)    Mobility  Activity Performed: Unable to mobilize (06/09/18 2000)  Reason Not Mobilized: Unstable condition (06/09/18 2000)

## 2018-06-11 ENCOUNTER — APPOINTMENT (OUTPATIENT)
Dept: RADIOLOGY | Facility: MEDICAL CENTER | Age: 63
DRG: 003 | End: 2018-06-11
Attending: SURGERY
Payer: COMMERCIAL

## 2018-06-11 PROBLEM — E87.0 HYPERNATREMIA: Status: ACTIVE | Noted: 2018-06-11

## 2018-06-11 LAB
ACTION RANGE TRIGGERED IACRT: NO
ACTION RANGE TRIGGERED IACRT: YES
ALBUMIN SERPL BCP-MCNC: 2.8 G/DL (ref 3.2–4.9)
ALBUMIN/GLOB SERPL: 0.9 G/DL
ALP SERPL-CCNC: 96 U/L (ref 30–99)
ALT SERPL-CCNC: 215 U/L (ref 2–50)
AMMONIA PLAS-SCNC: 51 UMOL/L (ref 11–45)
ANION GAP SERPL CALC-SCNC: 10 MMOL/L (ref 0–11.9)
ANION GAP SERPL CALC-SCNC: 5 MMOL/L (ref 0–11.9)
AST SERPL-CCNC: 302 U/L (ref 12–45)
BACTERIA BLD CULT: NORMAL
BACTERIA BLD CULT: NORMAL
BASE EXCESS BLDA CALC-SCNC: 6 MMOL/L (ref -4–3)
BASE EXCESS BLDA CALC-SCNC: 8 MMOL/L (ref -4–3)
BASOPHILS # BLD AUTO: 0.9 % (ref 0–1.8)
BASOPHILS # BLD: 0.17 K/UL (ref 0–0.12)
BILIRUB SERPL-MCNC: 2.5 MG/DL (ref 0.1–1.5)
BODY TEMPERATURE: ABNORMAL DEGREES
BODY TEMPERATURE: ABNORMAL DEGREES
BUN SERPL-MCNC: 64 MG/DL (ref 8–22)
BUN SERPL-MCNC: 65 MG/DL (ref 8–22)
CALCIUM SERPL-MCNC: 8.2 MG/DL (ref 8.5–10.5)
CALCIUM SERPL-MCNC: 8.3 MG/DL (ref 8.5–10.5)
CFT BLD TEG: 11.1 MIN (ref 5–10)
CFT P HPASE BLD TEG: 10.1 MIN (ref 5–10)
CHLORIDE SERPL-SCNC: 111 MMOL/L (ref 96–112)
CHLORIDE SERPL-SCNC: 111 MMOL/L (ref 96–112)
CLOT ANGLE BLD TEG: 66.9 DEGREES (ref 53–72)
CLOT ANGLE P HPASE BLD TEG: 71.6 DEGREES (ref 53–72)
CLOT INIT P HPASE BLD TEG: 1.4 MIN (ref 1–3)
CLOT LYSIS 30M P MA LENFR BLD TEG: 0 % (ref 0–8)
CLOT LYSIS 30M P MA LENFR BLD TEG: 3.5 % (ref 0–8)
CO2 BLDA-SCNC: 32 MMOL/L (ref 20–33)
CO2 BLDA-SCNC: 35 MMOL/L (ref 20–33)
CO2 SERPL-SCNC: 29 MMOL/L (ref 20–33)
CO2 SERPL-SCNC: 33 MMOL/L (ref 20–33)
CREAT SERPL-MCNC: 1.37 MG/DL (ref 0.5–1.4)
CREAT SERPL-MCNC: 1.38 MG/DL (ref 0.5–1.4)
CRP SERPL HS-MCNC: 17.06 MG/DL (ref 0–0.75)
CT.EXTRINSIC BLD ROTEM: 1.7 MIN (ref 1–3)
EOSINOPHIL # BLD AUTO: 0 K/UL (ref 0–0.51)
EOSINOPHIL NFR BLD: 0 % (ref 0–6.9)
ERYTHROCYTE [DISTWIDTH] IN BLOOD BY AUTOMATED COUNT: 53.6 FL (ref 35.9–50)
GLOBULIN SER CALC-MCNC: 3.2 G/DL (ref 1.9–3.5)
GLUCOSE SERPL-MCNC: 128 MG/DL (ref 65–99)
GLUCOSE SERPL-MCNC: 142 MG/DL (ref 65–99)
HCO3 BLDA-SCNC: 31 MMOL/L (ref 17–25)
HCO3 BLDA-SCNC: 33.3 MMOL/L (ref 17–25)
HCT VFR BLD AUTO: 25.8 % (ref 42–52)
HGB BLD-MCNC: 7.7 G/DL (ref 14–18)
INST. QUALIFIED PATIENT IIQPT: YES
INST. QUALIFIED PATIENT IIQPT: YES
LG PLATELETS BLD QL SMEAR: NORMAL
LYMPHOCYTES # BLD AUTO: 1.42 K/UL (ref 1–4.8)
LYMPHOCYTES NFR BLD: 7.4 % (ref 22–41)
MAGNESIUM SERPL-MCNC: 2.5 MG/DL (ref 1.5–2.5)
MANUAL DIFF BLD: NORMAL
MCF BLD TEG: 77.9 MM (ref 50–70)
MCF P HPASE BLD TEG: 74 MM (ref 50–70)
MCH RBC QN AUTO: 30.2 PG (ref 27–33)
MCHC RBC AUTO-ENTMCNC: 29.8 G/DL (ref 33.7–35.3)
MCV RBC AUTO: 101.2 FL (ref 81.4–97.8)
METAMYELOCYTES NFR BLD MANUAL: 3.7 %
MONOCYTES # BLD AUTO: 0.71 K/UL (ref 0–0.85)
MONOCYTES NFR BLD AUTO: 3.7 % (ref 0–13.4)
MORPHOLOGY BLD-IMP: NORMAL
MYELOCYTES NFR BLD MANUAL: 0.9 %
NEUTROPHILS # BLD AUTO: 16.01 K/UL (ref 1.82–7.42)
NEUTROPHILS NFR BLD: 83.4 % (ref 44–72)
NRBC # BLD AUTO: 0.03 K/UL
NRBC BLD-RTO: 0.2 /100 WBC
O2/TOTAL GAS SETTING VFR VENT: 70 %
O2/TOTAL GAS SETTING VFR VENT: 70 %
PCO2 BLDA: 44 MMHG (ref 26–37)
PCO2 BLDA: 50.7 MMHG (ref 26–37)
PCO2 TEMP ADJ BLDA: 45.9 MMHG (ref 26–37)
PCO2 TEMP ADJ BLDA: 53 MMHG (ref 26–37)
PH BLDA: 7.42 [PH] (ref 7.4–7.5)
PH BLDA: 7.46 [PH] (ref 7.4–7.5)
PH TEMP ADJ BLDA: 7.41 [PH] (ref 7.4–7.5)
PH TEMP ADJ BLDA: 7.44 [PH] (ref 7.4–7.5)
PHOSPHATE SERPL-MCNC: 2.8 MG/DL (ref 2.5–4.5)
PLATELET # BLD AUTO: 281 K/UL (ref 164–446)
PLATELET BLD QL SMEAR: NORMAL
PMV BLD AUTO: 11.5 FL (ref 9–12.9)
PO2 BLDA: 123 MMHG (ref 64–87)
PO2 BLDA: 141 MMHG (ref 64–87)
PO2 TEMP ADJ BLDA: 130 MMHG (ref 64–87)
PO2 TEMP ADJ BLDA: 147 MMHG (ref 64–87)
POTASSIUM SERPL-SCNC: 3.9 MMOL/L (ref 3.6–5.5)
POTASSIUM SERPL-SCNC: 4.3 MMOL/L (ref 3.6–5.5)
PREALB SERPL-MCNC: 8 MG/DL (ref 18–38)
PROT SERPL-MCNC: 6 G/DL (ref 6–8.2)
RBC # BLD AUTO: 2.55 M/UL (ref 4.7–6.1)
RBC BLD AUTO: PRESENT
SAO2 % BLDA: 99 % (ref 93–99)
SAO2 % BLDA: 99 % (ref 93–99)
SIGNIFICANT IND 70042: NORMAL
SIGNIFICANT IND 70042: NORMAL
SITE SITE: NORMAL
SITE SITE: NORMAL
SODIUM SERPL-SCNC: 149 MMOL/L (ref 135–145)
SODIUM SERPL-SCNC: 150 MMOL/L (ref 135–145)
SOURCE SOURCE: NORMAL
SOURCE SOURCE: NORMAL
SPECIMEN DRAWN FROM PATIENT: ABNORMAL
SPECIMEN DRAWN FROM PATIENT: ABNORMAL
TEG ALGORITHM TGALG: ABNORMAL
TOXIC GRANULES BLD QL SMEAR: SLIGHT
WBC # BLD AUTO: 19.2 K/UL (ref 4.8–10.8)

## 2018-06-11 PROCEDURE — 700111 HCHG RX REV CODE 636 W/ 250 OVERRIDE (IP): Performed by: SURGERY

## 2018-06-11 PROCEDURE — 83735 ASSAY OF MAGNESIUM: CPT

## 2018-06-11 PROCEDURE — 80048 BASIC METABOLIC PNL TOTAL CA: CPT

## 2018-06-11 PROCEDURE — A9270 NON-COVERED ITEM OR SERVICE: HCPCS | Performed by: SURGERY

## 2018-06-11 PROCEDURE — 78227 HEPATOBIL SYST IMAGE W/DRUG: CPT

## 2018-06-11 PROCEDURE — 700102 HCHG RX REV CODE 250 W/ 637 OVERRIDE(OP): Performed by: SURGERY

## 2018-06-11 PROCEDURE — 94003 VENT MGMT INPAT SUBQ DAY: CPT

## 2018-06-11 PROCEDURE — 85384 FIBRINOGEN ACTIVITY: CPT

## 2018-06-11 PROCEDURE — 99291 CRITICAL CARE FIRST HOUR: CPT | Performed by: SURGERY

## 2018-06-11 PROCEDURE — 82803 BLOOD GASES ANY COMBINATION: CPT

## 2018-06-11 PROCEDURE — 85007 BL SMEAR W/DIFF WBC COUNT: CPT

## 2018-06-11 PROCEDURE — 700101 HCHG RX REV CODE 250: Performed by: SURGERY

## 2018-06-11 PROCEDURE — 700112 HCHG RX REV CODE 229: Performed by: SURGERY

## 2018-06-11 PROCEDURE — 36600 WITHDRAWAL OF ARTERIAL BLOOD: CPT

## 2018-06-11 PROCEDURE — 84100 ASSAY OF PHOSPHORUS: CPT

## 2018-06-11 PROCEDURE — 71045 X-RAY EXAM CHEST 1 VIEW: CPT

## 2018-06-11 PROCEDURE — 82140 ASSAY OF AMMONIA: CPT

## 2018-06-11 PROCEDURE — 94640 AIRWAY INHALATION TREATMENT: CPT

## 2018-06-11 PROCEDURE — 86140 C-REACTIVE PROTEIN: CPT

## 2018-06-11 PROCEDURE — 84134 ASSAY OF PREALBUMIN: CPT

## 2018-06-11 PROCEDURE — 85576 BLOOD PLATELET AGGREGATION: CPT | Mod: 91

## 2018-06-11 PROCEDURE — 770022 HCHG ROOM/CARE - ICU (200)

## 2018-06-11 PROCEDURE — 85347 COAGULATION TIME ACTIVATED: CPT

## 2018-06-11 PROCEDURE — 80053 COMPREHEN METABOLIC PANEL: CPT

## 2018-06-11 PROCEDURE — 700111 HCHG RX REV CODE 636 W/ 250 OVERRIDE (IP)

## 2018-06-11 PROCEDURE — 74018 RADEX ABDOMEN 1 VIEW: CPT

## 2018-06-11 PROCEDURE — 700105 HCHG RX REV CODE 258: Performed by: SURGERY

## 2018-06-11 PROCEDURE — 93971 EXTREMITY STUDY: CPT

## 2018-06-11 PROCEDURE — 85027 COMPLETE CBC AUTOMATED: CPT

## 2018-06-11 RX ORDER — DEXTROSE AND SODIUM CHLORIDE 5; .45 G/100ML; G/100ML
INJECTION, SOLUTION INTRAVENOUS CONTINUOUS
Status: DISCONTINUED | OUTPATIENT
Start: 2018-06-11 | End: 2018-06-12

## 2018-06-11 RX ORDER — MORPHINE SULFATE 10 MG/ML
INJECTION, SOLUTION INTRAMUSCULAR; INTRAVENOUS
Status: COMPLETED
Start: 2018-06-11 | End: 2018-06-11

## 2018-06-11 RX ADMIN — IPRATROPIUM BROMIDE AND ALBUTEROL SULFATE 3 ML: .5; 3 SOLUTION RESPIRATORY (INHALATION) at 10:56

## 2018-06-11 RX ADMIN — FAMOTIDINE 20 MG: 20 TABLET ORAL at 20:37

## 2018-06-11 RX ADMIN — SENNOSIDES AND DOCUSATE SODIUM 1 TABLET: 8.6; 5 TABLET ORAL at 20:37

## 2018-06-11 RX ADMIN — LACTULOSE 30 ML: 20 SOLUTION ORAL at 08:56

## 2018-06-11 RX ADMIN — ENOXAPARIN SODIUM 30 MG: 100 INJECTION SUBCUTANEOUS at 20:37

## 2018-06-11 RX ADMIN — ENOXAPARIN SODIUM 30 MG: 100 INJECTION SUBCUTANEOUS at 08:56

## 2018-06-11 RX ADMIN — SODIUM BICARBONATE 3 ML: 84 INJECTION, SOLUTION INTRAVENOUS at 03:04

## 2018-06-11 RX ADMIN — DEXTROSE AND SODIUM CHLORIDE: 5; 450 INJECTION, SOLUTION INTRAVENOUS at 10:49

## 2018-06-11 RX ADMIN — RIFAXIMIN 550 MG: 550 TABLET ORAL at 21:00

## 2018-06-11 RX ADMIN — IPRATROPIUM BROMIDE AND ALBUTEROL SULFATE 3 ML: .5; 3 SOLUTION RESPIRATORY (INHALATION) at 07:06

## 2018-06-11 RX ADMIN — LACTULOSE 30 ML: 20 SOLUTION ORAL at 20:44

## 2018-06-11 RX ADMIN — PIPERACILLIN SODIUM AND TAZOBACTAM SODIUM 3.38 G: 3; .375 INJECTION, POWDER, FOR SOLUTION INTRAVENOUS at 04:14

## 2018-06-11 RX ADMIN — POLYETHYLENE GLYCOL 3350 1 PACKET: 17 POWDER, FOR SOLUTION ORAL at 20:38

## 2018-06-11 RX ADMIN — PIPERACILLIN SODIUM AND TAZOBACTAM SODIUM 3.38 G: 3; .375 INJECTION, POWDER, FOR SOLUTION INTRAVENOUS at 12:40

## 2018-06-11 RX ADMIN — IPRATROPIUM BROMIDE AND ALBUTEROL SULFATE 3 ML: .5; 3 SOLUTION RESPIRATORY (INHALATION) at 16:25

## 2018-06-11 RX ADMIN — IPRATROPIUM BROMIDE AND ALBUTEROL SULFATE 3 ML: .5; 3 SOLUTION RESPIRATORY (INHALATION) at 23:00

## 2018-06-11 RX ADMIN — IPRATROPIUM BROMIDE AND ALBUTEROL SULFATE 3 ML: .5; 3 SOLUTION RESPIRATORY (INHALATION) at 03:05

## 2018-06-11 RX ADMIN — LACTULOSE 30 ML: 20 SOLUTION ORAL at 16:15

## 2018-06-11 RX ADMIN — DOCUSATE SODIUM 100 MG: 50 LIQUID ORAL at 20:37

## 2018-06-11 RX ADMIN — IPRATROPIUM BROMIDE AND ALBUTEROL SULFATE 3 ML: .5; 3 SOLUTION RESPIRATORY (INHALATION) at 19:04

## 2018-06-11 RX ADMIN — RIFAXIMIN 550 MG: 550 TABLET ORAL at 08:56

## 2018-06-11 RX ADMIN — SODIUM BICARBONATE 3 ML: 84 INJECTION, SOLUTION INTRAVENOUS at 07:06

## 2018-06-11 RX ADMIN — SODIUM CHLORIDE, POTASSIUM CHLORIDE, SODIUM LACTATE AND CALCIUM CHLORIDE: 600; 310; 30; 20 INJECTION, SOLUTION INTRAVENOUS at 03:28

## 2018-06-11 RX ADMIN — SODIUM BICARBONATE 3 ML: 84 INJECTION, SOLUTION INTRAVENOUS at 19:04

## 2018-06-11 RX ADMIN — SODIUM BICARBONATE 2 ML: 84 INJECTION, SOLUTION INTRAVENOUS at 16:25

## 2018-06-11 RX ADMIN — MORPHINE SULFATE 3 MG: 10 INJECTION INTRAVENOUS at 14:36

## 2018-06-11 RX ADMIN — SODIUM BICARBONATE 3 ML: 84 INJECTION, SOLUTION INTRAVENOUS at 10:54

## 2018-06-11 RX ADMIN — FAMOTIDINE 20 MG: 20 TABLET ORAL at 08:56

## 2018-06-11 RX ADMIN — PIPERACILLIN SODIUM AND TAZOBACTAM SODIUM 3.38 G: 3; .375 INJECTION, POWDER, FOR SOLUTION INTRAVENOUS at 20:40

## 2018-06-11 RX ADMIN — SODIUM BICARBONATE 3 ML: 84 INJECTION, SOLUTION INTRAVENOUS at 23:00

## 2018-06-11 NOTE — CARE PLAN
Problem: Ventilation Defect:  Goal: Ability to achieve and maintain unassisted ventilation or tolerate decreased levels of ventilator support    Intervention: Support and monitor invasive and noninvasive mechanical ventilation  Adult Ventilation Update    Total Vent Days: 11    Patient Lines/Drains/Airways Status    Active Airway     Name: Placement date: Placement time: Site: Days:    Airway Group Portex Trach Tracheostomy 8.0 06/07/18   1055   Tracheostomy   5              APRV  28/0  4/ .6 40%    Events/Summary/Plan: Titrating FIO2 as tolerated. No other changes made this shift.

## 2018-06-11 NOTE — CARE PLAN
Problem: Communication  Goal: The ability to communicate needs accurately and effectively will improve    Intervention: Educate patient and significant other/support system about the plan of care, procedures, treatments, medications and allow for questions  RN educates pt's SO regarding POC, meds, and procedures. MD comes to bedside and provides education and understanding regarding POC and prognosis. SO verbalizes understanding and is tearful.  RN provides emotional support, one-one communication, and encourages SO to verbalize feelings.      Problem: Infection  Goal: Will remain free from infection  Pt has elevated temperature. This RN places ice packs to axillary, abdomen, and groin. Temperature monitored by delgado Q 1 hour, monitoring closely. Family educated on infection prevention protocol. Family verbalizes and demonstrates understanding. Delgado care complete.    Problem: Pain Management  Goal: Pain level will decrease to patient's comfort goal    Intervention: Follow pain managment plan developed in collaboration with patient and Interdisciplinary Team  Pt has elevated BP and nonverbal signs of discomfort. This RN administers PO oxy PRN per MAR and repositions to comfort Q2 hours. Partial bed bath provided.

## 2018-06-11 NOTE — CARE PLAN
Problem: Safety  Goal: Will remain free from falls  Pt mobility assessed. Pt is a standby assist with a staggering gait. This RN reinforces need to call for assistance before getting OOB. Pt educated on call light/phone system. Pt verbalizes understanding. Pt calls appropriately. Bed alarm on. Fall precautions in place. Bed is locked and in lowest position. Pt has treaded slippers in place.    Problem: Discharge Barriers/Planning  Goal: Patient's continuum of care needs will be met  Pt reports that he wants to quit drinking and reports severe depression. This RN consults social work. Pt lives in [place], social work states pt needs local resources and insurance established.    Problem: Pain Management  Goal: Pain level will decrease to patient's comfort goal    Intervention: Follow pain managment plan developed in collaboration with patient and Interdisciplinary Team  Pt reports pain to throat and R eyebrow laceration site. This RN administers IV morphine and PO tylenol PRN per MAR.
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
## 2018-06-11 NOTE — CARE PLAN
Problem: Knowledge Deficit  Goal: Knowledge of disease process/condition, treatment plan, diagnostic tests, and medications will improve  Pt and family will understand condition and treatment protocols.    Problem: Pain Management  Goal: Pain level will decrease to patient's comfort goal  Outcome: PROGRESSING AS EXPECTED  Pt's pain will be assessed with the CPOT scale and treated with rest, repositioning, quiet environment, and PRN pain medications.
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
## 2018-06-11 NOTE — CARE PLAN
Problem: Ventilation Defect:  Goal: Ability to achieve and maintain unassisted ventilation or tolerate decreased levels of ventilator support    Intervention: Support and monitor invasive and noninvasive mechanical ventilation  Adult Ventilation Update    Total Vent Days: 11    Patient Lines/Drains/Airways Status    Active Airway     Name: Placement date: Placement time: Site: Days:    Airway Group Portex Trach Tracheostomy 8.0 06/07/18   1055   Tracheostomy   5              Barriers to Wean: FiO2 >60% or PEEP >10 CM H2O (06/10/18 0656)    Cough: Productive (06/11/18 0400)  Sputum Amount: Moderate;Large (06/11/18 0400)  Sputum Color: Yellow;Brown (06/11/18 0400)  Sputum Consistency: Thick;Thin (06/11/18 0400)    Mobility  Level of Mobility: Level I (06/10/18 2000)  Reason Not Mobilized: Unstable condition (06/10/18 2000)  Mobilization Comments:  (RAAS -5) (06/10/18 2000)

## 2018-06-11 NOTE — CARE PLAN
Problem: Respiratory:  Goal: Respiratory status will improve    Intervention: Assess and monitor pulmonary status  Continuous vital sign and pulse oximetry monitoring, collaboration with RT, suctioning as needed, Q2 hour turns      Problem: Skin Integrity  Goal: Risk for impaired skin integrity will decrease    Intervention: Assess risk factors for impaired skin integrity and/or pressure ulcers  Q2 hour turns, pillows used for positioning, sheet changes as needed, range of motion

## 2018-06-12 ENCOUNTER — APPOINTMENT (OUTPATIENT)
Dept: RADIOLOGY | Facility: MEDICAL CENTER | Age: 63
DRG: 003 | End: 2018-06-12
Attending: SURGERY
Payer: COMMERCIAL

## 2018-06-12 PROBLEM — G93.40 ENCEPHALOPATHY ACUTE: Status: ACTIVE | Noted: 2018-06-09

## 2018-06-12 PROBLEM — I63.9 EMBOLIC STROKE (HCC): Status: ACTIVE | Noted: 2018-06-12

## 2018-06-12 LAB
ACTION RANGE TRIGGERED IACRT: NO
ALBUMIN SERPL BCP-MCNC: 2.7 G/DL (ref 3.2–4.9)
ALBUMIN/GLOB SERPL: 0.8 G/DL
ALP SERPL-CCNC: 84 U/L (ref 30–99)
ALT SERPL-CCNC: 252 U/L (ref 2–50)
AMMONIA PLAS-SCNC: 53 UMOL/L (ref 11–45)
ANION GAP SERPL CALC-SCNC: 11 MMOL/L (ref 0–11.9)
ANION GAP SERPL CALC-SCNC: 9 MMOL/L (ref 0–11.9)
AST SERPL-CCNC: 322 U/L (ref 12–45)
BASE EXCESS BLDA CALC-SCNC: 7 MMOL/L (ref -4–3)
BASOPHILS # BLD AUTO: 0.3 % (ref 0–1.8)
BASOPHILS # BLD: 0.05 K/UL (ref 0–0.12)
BILIRUB SERPL-MCNC: 2.4 MG/DL (ref 0.1–1.5)
BODY TEMPERATURE: ABNORMAL DEGREES
BUN SERPL-MCNC: 57 MG/DL (ref 8–22)
BUN SERPL-MCNC: 58 MG/DL (ref 8–22)
CALCIUM SERPL-MCNC: 8 MG/DL (ref 8.5–10.5)
CALCIUM SERPL-MCNC: 8 MG/DL (ref 8.5–10.5)
CHLORIDE SERPL-SCNC: 110 MMOL/L (ref 96–112)
CHLORIDE SERPL-SCNC: 111 MMOL/L (ref 96–112)
CO2 BLDA-SCNC: 32 MMOL/L (ref 20–33)
CO2 SERPL-SCNC: 28 MMOL/L (ref 20–33)
CO2 SERPL-SCNC: 31 MMOL/L (ref 20–33)
COMMENT 1642: NORMAL
CREAT SERPL-MCNC: 1.09 MG/DL (ref 0.5–1.4)
CREAT SERPL-MCNC: 1.18 MG/DL (ref 0.5–1.4)
EOSINOPHIL # BLD AUTO: 0.09 K/UL (ref 0–0.51)
EOSINOPHIL NFR BLD: 0.5 % (ref 0–6.9)
ERYTHROCYTE [DISTWIDTH] IN BLOOD BY AUTOMATED COUNT: 51 FL (ref 35.9–50)
GLOBULIN SER CALC-MCNC: 3.3 G/DL (ref 1.9–3.5)
GLUCOSE SERPL-MCNC: 139 MG/DL (ref 65–99)
GLUCOSE SERPL-MCNC: 149 MG/DL (ref 65–99)
HCO3 BLDA-SCNC: 31 MMOL/L (ref 17–25)
HCT VFR BLD AUTO: 25.2 % (ref 42–52)
HGB BLD-MCNC: 8 G/DL (ref 14–18)
IMM GRANULOCYTES # BLD AUTO: 1.17 K/UL (ref 0–0.11)
IMM GRANULOCYTES NFR BLD AUTO: 6.8 % (ref 0–0.9)
INR PPP: 1.59 (ref 0.87–1.13)
INST. QUALIFIED PATIENT IIQPT: YES
LYMPHOCYTES # BLD AUTO: 1.97 K/UL (ref 1–4.8)
LYMPHOCYTES NFR BLD: 11.4 % (ref 22–41)
MCH RBC QN AUTO: 30.8 PG (ref 27–33)
MCHC RBC AUTO-ENTMCNC: 31.7 G/DL (ref 33.7–35.3)
MCV RBC AUTO: 96.9 FL (ref 81.4–97.8)
MONOCYTES # BLD AUTO: 1.5 K/UL (ref 0–0.85)
MONOCYTES NFR BLD AUTO: 8.7 % (ref 0–13.4)
MORPHOLOGY BLD-IMP: NORMAL
NEUTROPHILS # BLD AUTO: 12.43 K/UL (ref 1.82–7.42)
NEUTROPHILS NFR BLD: 72.3 % (ref 44–72)
NRBC # BLD AUTO: 0.05 K/UL
NRBC BLD-RTO: 0.3 /100 WBC
O2/TOTAL GAS SETTING VFR VENT: 40 %
PCO2 BLDA: 39.9 MMHG (ref 26–37)
PCO2 TEMP ADJ BLDA: 42.1 MMHG (ref 26–37)
PH BLDA: 7.5 [PH] (ref 7.4–7.5)
PH TEMP ADJ BLDA: 7.48 [PH] (ref 7.4–7.5)
PLATELET # BLD AUTO: 346 K/UL (ref 164–446)
PMV BLD AUTO: 11.6 FL (ref 9–12.9)
PO2 BLDA: 75 MMHG (ref 64–87)
PO2 TEMP ADJ BLDA: 81 MMHG (ref 64–87)
POTASSIUM SERPL-SCNC: 3.4 MMOL/L (ref 3.6–5.5)
POTASSIUM SERPL-SCNC: 3.5 MMOL/L (ref 3.6–5.5)
PROT SERPL-MCNC: 6 G/DL (ref 6–8.2)
PROTHROMBIN TIME: 18.6 SEC (ref 12–14.6)
RBC # BLD AUTO: 2.6 M/UL (ref 4.7–6.1)
SAO2 % BLDA: 96 % (ref 93–99)
SODIUM SERPL-SCNC: 149 MMOL/L (ref 135–145)
SODIUM SERPL-SCNC: 151 MMOL/L (ref 135–145)
SPECIMEN DRAWN FROM PATIENT: ABNORMAL
WBC # BLD AUTO: 17.2 K/UL (ref 4.8–10.8)

## 2018-06-12 PROCEDURE — 770022 HCHG ROOM/CARE - ICU (200)

## 2018-06-12 PROCEDURE — A9270 NON-COVERED ITEM OR SERVICE: HCPCS | Performed by: SURGERY

## 2018-06-12 PROCEDURE — 700102 HCHG RX REV CODE 250 W/ 637 OVERRIDE(OP): Performed by: SURGERY

## 2018-06-12 PROCEDURE — 70551 MRI BRAIN STEM W/O DYE: CPT

## 2018-06-12 PROCEDURE — 94640 AIRWAY INHALATION TREATMENT: CPT

## 2018-06-12 PROCEDURE — 95951 EEG: CPT | Mod: 52

## 2018-06-12 PROCEDURE — 82140 ASSAY OF AMMONIA: CPT

## 2018-06-12 PROCEDURE — 85025 COMPLETE CBC W/AUTO DIFF WBC: CPT

## 2018-06-12 PROCEDURE — 85610 PROTHROMBIN TIME: CPT

## 2018-06-12 PROCEDURE — 72141 MRI NECK SPINE W/O DYE: CPT

## 2018-06-12 PROCEDURE — 700111 HCHG RX REV CODE 636 W/ 250 OVERRIDE (IP): Performed by: SURGERY

## 2018-06-12 PROCEDURE — 36600 WITHDRAWAL OF ARTERIAL BLOOD: CPT

## 2018-06-12 PROCEDURE — 700101 HCHG RX REV CODE 250: Performed by: SURGERY

## 2018-06-12 PROCEDURE — 82803 BLOOD GASES ANY COMBINATION: CPT

## 2018-06-12 PROCEDURE — 700105 HCHG RX REV CODE 258: Performed by: SURGERY

## 2018-06-12 PROCEDURE — 71045 X-RAY EXAM CHEST 1 VIEW: CPT

## 2018-06-12 PROCEDURE — 80048 BASIC METABOLIC PNL TOTAL CA: CPT

## 2018-06-12 PROCEDURE — 99291 CRITICAL CARE FIRST HOUR: CPT | Performed by: SURGERY

## 2018-06-12 PROCEDURE — 80053 COMPREHEN METABOLIC PANEL: CPT

## 2018-06-12 PROCEDURE — 94003 VENT MGMT INPAT SUBQ DAY: CPT

## 2018-06-12 RX ADMIN — LACTULOSE 30 ML: 20 SOLUTION ORAL at 08:14

## 2018-06-12 RX ADMIN — PIPERACILLIN SODIUM AND TAZOBACTAM SODIUM 3.38 G: 3; .375 INJECTION, POWDER, FOR SOLUTION INTRAVENOUS at 20:15

## 2018-06-12 RX ADMIN — PIPERACILLIN SODIUM AND TAZOBACTAM SODIUM 3.38 G: 3; .375 INJECTION, POWDER, FOR SOLUTION INTRAVENOUS at 05:45

## 2018-06-12 RX ADMIN — LABETALOL HYDROCHLORIDE 10 MG: 5 INJECTION INTRAVENOUS at 07:23

## 2018-06-12 RX ADMIN — IPRATROPIUM BROMIDE AND ALBUTEROL SULFATE 3 ML: .5; 3 SOLUTION RESPIRATORY (INHALATION) at 14:20

## 2018-06-12 RX ADMIN — RIFAXIMIN 550 MG: 550 TABLET ORAL at 08:03

## 2018-06-12 RX ADMIN — SODIUM BICARBONATE 3 ML: 84 INJECTION, SOLUTION INTRAVENOUS at 07:29

## 2018-06-12 RX ADMIN — FAMOTIDINE 20 MG: 20 TABLET ORAL at 20:15

## 2018-06-12 RX ADMIN — FAMOTIDINE 20 MG: 20 TABLET ORAL at 08:03

## 2018-06-12 RX ADMIN — IPRATROPIUM BROMIDE AND ALBUTEROL SULFATE 3 ML: .5; 3 SOLUTION RESPIRATORY (INHALATION) at 07:28

## 2018-06-12 RX ADMIN — RIFAXIMIN 550 MG: 550 TABLET ORAL at 22:56

## 2018-06-12 RX ADMIN — IPRATROPIUM BROMIDE AND ALBUTEROL SULFATE 3 ML: .5; 3 SOLUTION RESPIRATORY (INHALATION) at 09:16

## 2018-06-12 RX ADMIN — ENOXAPARIN SODIUM 30 MG: 100 INJECTION SUBCUTANEOUS at 08:03

## 2018-06-12 RX ADMIN — SODIUM BICARBONATE 3 ML: 84 INJECTION, SOLUTION INTRAVENOUS at 18:51

## 2018-06-12 RX ADMIN — ENOXAPARIN SODIUM 30 MG: 100 INJECTION SUBCUTANEOUS at 20:15

## 2018-06-12 RX ADMIN — LACTULOSE 30 ML: 20 SOLUTION ORAL at 14:18

## 2018-06-12 RX ADMIN — IPRATROPIUM BROMIDE AND ALBUTEROL SULFATE 3 ML: .5; 3 SOLUTION RESPIRATORY (INHALATION) at 18:50

## 2018-06-12 RX ADMIN — IPRATROPIUM BROMIDE AND ALBUTEROL SULFATE 3 ML: .5; 3 SOLUTION RESPIRATORY (INHALATION) at 02:58

## 2018-06-12 RX ADMIN — SODIUM BICARBONATE 3 ML: 84 INJECTION, SOLUTION INTRAVENOUS at 09:16

## 2018-06-12 RX ADMIN — SODIUM BICARBONATE 3 ML: 84 INJECTION, SOLUTION INTRAVENOUS at 02:58

## 2018-06-12 RX ADMIN — PIPERACILLIN SODIUM AND TAZOBACTAM SODIUM 3.38 G: 3; .375 INJECTION, POWDER, FOR SOLUTION INTRAVENOUS at 14:18

## 2018-06-12 RX ADMIN — IPRATROPIUM BROMIDE AND ALBUTEROL SULFATE 3 ML: .5; 3 SOLUTION RESPIRATORY (INHALATION) at 22:47

## 2018-06-12 RX ADMIN — SODIUM BICARBONATE 3 ML: 84 INJECTION, SOLUTION INTRAVENOUS at 22:48

## 2018-06-12 NOTE — CARE PLAN
Problem: Safety  Goal: Will remain free from falls  Outcome: PROGRESSING AS EXPECTED  Patient will remain free of injury and all fall and safety precautions maintained throughout the shift. Will continue to monitor the patient closely and update the doctor upon any new changes

## 2018-06-12 NOTE — CARE PLAN
Problem: Infection  Goal: Will remain free from infection  Outcome: PROGRESSING SLOWER THAN EXPECTED  Assessed the patient the signs and symptoms of infections, all necessary precautions were maintained and implemented when assessing patient lines and delgado. Proper hand washing was performed prior to and post activities with the patient. Will continue to monitor the patient the possible risks of infections and help prevent new infections from occurring.

## 2018-06-12 NOTE — CARE PLAN
Problem: Ventilation Defect:  Goal: Ability to achieve and maintain unassisted ventilation or tolerate decreased levels of ventilator support    Intervention: Support and monitor invasive and noninvasive mechanical ventilation  Adult Ventilation Update    Total Vent Days: 12    Patient Lines/Drains/Airways Status    Active Airway     Name: Placement date: Placement time: Site: Days:    Airway Group Portex Trach Tracheostomy 8.0 06/07/18   1055   Tracheostomy   6              Cough: Productive (06/12/18 0259)  Sputum Amount: Moderate (06/12/18 0259)  Sputum Color: Yellow;Brown (06/12/18 0259)  Sputum Consistency: Thick;Thin (06/12/18 0259)    Events/Summary/Plan: decrease P-high to 24 post abg (06/11/18 0691)

## 2018-06-12 NOTE — CARE PLAN
Problem: Pain Management  Goal: Pain level will decrease to patient's comfort goal  Outcome: PROGRESSING AS EXPECTED  Pt's pain will continue to be assessed using the CPOT tool and will be treated appropriately with rest, repositioning, and pharmacological interventions.    Problem: Skin Integrity  Goal: Risk for impaired skin integrity will decrease  Outcome: PROGRESSING AS EXPECTED  Pt's skin will be vigilantly monitored for breakdown or risk for breakdown.
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
## 2018-06-12 NOTE — PROGRESS NOTES
"Discussed MRI results with Dr. Castillo. Per Dr. Castillo, ok for nursing staff to talk to family regarding MRI results. In terms of \"minor findings,\" \"no spinal cord damage,\" \"not what is causing problem, more likely related to toxic metabolic issues.\"      "

## 2018-06-13 ENCOUNTER — APPOINTMENT (OUTPATIENT)
Dept: RADIOLOGY | Facility: MEDICAL CENTER | Age: 63
DRG: 003 | End: 2018-06-13
Attending: SURGERY
Payer: COMMERCIAL

## 2018-06-13 LAB
ACTION RANGE TRIGGERED IACRT: YES
ALBUMIN SERPL BCP-MCNC: 2.5 G/DL (ref 3.2–4.9)
ALBUMIN/GLOB SERPL: 0.7 G/DL
ALP SERPL-CCNC: 79 U/L (ref 30–99)
ALT SERPL-CCNC: 239 U/L (ref 2–50)
ANION GAP SERPL CALC-SCNC: 9 MMOL/L (ref 0–11.9)
AST SERPL-CCNC: 202 U/L (ref 12–45)
BASE EXCESS BLDA CALC-SCNC: 7 MMOL/L (ref -4–3)
BASOPHILS # BLD AUTO: 0.4 % (ref 0–1.8)
BASOPHILS # BLD: 0.08 K/UL (ref 0–0.12)
BILIRUB SERPL-MCNC: 2.1 MG/DL (ref 0.1–1.5)
BODY TEMPERATURE: ABNORMAL DEGREES
BUN SERPL-MCNC: 55 MG/DL (ref 8–22)
CALCIUM SERPL-MCNC: 7.9 MG/DL (ref 8.5–10.5)
CHLORIDE SERPL-SCNC: 111 MMOL/L (ref 96–112)
CO2 BLDA-SCNC: 33 MMOL/L (ref 20–33)
CO2 SERPL-SCNC: 29 MMOL/L (ref 20–33)
CREAT SERPL-MCNC: 1 MG/DL (ref 0.5–1.4)
EOSINOPHIL # BLD AUTO: 0.1 K/UL (ref 0–0.51)
EOSINOPHIL NFR BLD: 0.5 % (ref 0–6.9)
ERYTHROCYTE [DISTWIDTH] IN BLOOD BY AUTOMATED COUNT: 52.3 FL (ref 35.9–50)
GLOBULIN SER CALC-MCNC: 3.8 G/DL (ref 1.9–3.5)
GLUCOSE SERPL-MCNC: 148 MG/DL (ref 65–99)
HCO3 BLDA-SCNC: 32 MMOL/L (ref 17–25)
HCT VFR BLD AUTO: 27.2 % (ref 42–52)
HGB BLD-MCNC: 8.2 G/DL (ref 14–18)
IMM GRANULOCYTES # BLD AUTO: 1.01 K/UL (ref 0–0.11)
IMM GRANULOCYTES NFR BLD AUTO: 5.5 % (ref 0–0.9)
INST. QUALIFIED PATIENT IIQPT: YES
LYMPHOCYTES # BLD AUTO: 2.27 K/UL (ref 1–4.8)
LYMPHOCYTES NFR BLD: 12.3 % (ref 22–41)
MCH RBC QN AUTO: 29.5 PG (ref 27–33)
MCHC RBC AUTO-ENTMCNC: 30.1 G/DL (ref 33.7–35.3)
MCV RBC AUTO: 97.8 FL (ref 81.4–97.8)
MONOCYTES # BLD AUTO: 1.43 K/UL (ref 0–0.85)
MONOCYTES NFR BLD AUTO: 7.8 % (ref 0–13.4)
NEUTROPHILS # BLD AUTO: 13.51 K/UL (ref 1.82–7.42)
NEUTROPHILS NFR BLD: 73.5 % (ref 44–72)
NRBC # BLD AUTO: 0.02 K/UL
NRBC BLD-RTO: 0.1 /100 WBC
O2/TOTAL GAS SETTING VFR VENT: 50 %
PCO2 BLDA: 48.6 MMHG (ref 26–37)
PCO2 TEMP ADJ BLDA: 50.2 MMHG (ref 26–37)
PH BLDA: 7.43 [PH] (ref 7.4–7.5)
PH TEMP ADJ BLDA: 7.42 [PH] (ref 7.4–7.5)
PLATELET # BLD AUTO: 404 K/UL (ref 164–446)
PMV BLD AUTO: 11.7 FL (ref 9–12.9)
PO2 BLDA: 90 MMHG (ref 64–87)
PO2 TEMP ADJ BLDA: 95 MMHG (ref 64–87)
POTASSIUM SERPL-SCNC: 3.6 MMOL/L (ref 3.6–5.5)
PROT SERPL-MCNC: 6.3 G/DL (ref 6–8.2)
RBC # BLD AUTO: 2.78 M/UL (ref 4.7–6.1)
SAO2 % BLDA: 97 % (ref 93–99)
SODIUM SERPL-SCNC: 149 MMOL/L (ref 135–145)
SPECIMEN DRAWN FROM PATIENT: ABNORMAL
WBC # BLD AUTO: 18.4 K/UL (ref 4.8–10.8)

## 2018-06-13 PROCEDURE — 700102 HCHG RX REV CODE 250 W/ 637 OVERRIDE(OP): Performed by: SURGERY

## 2018-06-13 PROCEDURE — A9270 NON-COVERED ITEM OR SERVICE: HCPCS | Performed by: SURGERY

## 2018-06-13 PROCEDURE — 85025 COMPLETE CBC W/AUTO DIFF WBC: CPT

## 2018-06-13 PROCEDURE — 700105 HCHG RX REV CODE 258: Performed by: SURGERY

## 2018-06-13 PROCEDURE — 700111 HCHG RX REV CODE 636 W/ 250 OVERRIDE (IP): Performed by: SURGERY

## 2018-06-13 PROCEDURE — 82803 BLOOD GASES ANY COMBINATION: CPT

## 2018-06-13 PROCEDURE — 80053 COMPREHEN METABOLIC PANEL: CPT

## 2018-06-13 PROCEDURE — 71045 X-RAY EXAM CHEST 1 VIEW: CPT

## 2018-06-13 PROCEDURE — 94640 AIRWAY INHALATION TREATMENT: CPT

## 2018-06-13 PROCEDURE — 700101 HCHG RX REV CODE 250: Performed by: SURGERY

## 2018-06-13 PROCEDURE — 770022 HCHG ROOM/CARE - ICU (200)

## 2018-06-13 PROCEDURE — 99291 CRITICAL CARE FIRST HOUR: CPT | Performed by: SURGERY

## 2018-06-13 PROCEDURE — 36600 WITHDRAWAL OF ARTERIAL BLOOD: CPT

## 2018-06-13 PROCEDURE — 94003 VENT MGMT INPAT SUBQ DAY: CPT

## 2018-06-13 RX ORDER — CEFAZOLIN SODIUM 2 G/100ML
2 INJECTION, SOLUTION INTRAVENOUS EVERY 8 HOURS
Status: COMPLETED | OUTPATIENT
Start: 2018-06-13 | End: 2018-06-19

## 2018-06-13 RX ADMIN — SODIUM BICARBONATE 3 ML: 84 INJECTION, SOLUTION INTRAVENOUS at 03:00

## 2018-06-13 RX ADMIN — LACTULOSE 30 ML: 20 SOLUTION ORAL at 14:06

## 2018-06-13 RX ADMIN — IPRATROPIUM BROMIDE AND ALBUTEROL SULFATE 3 ML: .5; 3 SOLUTION RESPIRATORY (INHALATION) at 23:47

## 2018-06-13 RX ADMIN — LACTULOSE 30 ML: 20 SOLUTION ORAL at 21:01

## 2018-06-13 RX ADMIN — CEFAZOLIN SODIUM 2 G: 2 INJECTION, SOLUTION INTRAVENOUS at 14:06

## 2018-06-13 RX ADMIN — FAMOTIDINE 20 MG: 20 TABLET ORAL at 21:01

## 2018-06-13 RX ADMIN — LACTULOSE 30 ML: 20 SOLUTION ORAL at 08:21

## 2018-06-13 RX ADMIN — IPRATROPIUM BROMIDE AND ALBUTEROL SULFATE 3 ML: .5; 3 SOLUTION RESPIRATORY (INHALATION) at 19:40

## 2018-06-13 RX ADMIN — RIFAXIMIN 550 MG: 550 TABLET ORAL at 08:21

## 2018-06-13 RX ADMIN — SODIUM BICARBONATE 3 ML: 84 INJECTION, SOLUTION INTRAVENOUS at 19:40

## 2018-06-13 RX ADMIN — IPRATROPIUM BROMIDE AND ALBUTEROL SULFATE 3 ML: .5; 3 SOLUTION RESPIRATORY (INHALATION) at 03:00

## 2018-06-13 RX ADMIN — CEFAZOLIN SODIUM 2 G: 2 INJECTION, SOLUTION INTRAVENOUS at 21:01

## 2018-06-13 RX ADMIN — IPRATROPIUM BROMIDE AND ALBUTEROL SULFATE 3 ML: .5; 3 SOLUTION RESPIRATORY (INHALATION) at 10:44

## 2018-06-13 RX ADMIN — SODIUM BICARBONATE 3 ML: 84 INJECTION, SOLUTION INTRAVENOUS at 10:44

## 2018-06-13 RX ADMIN — ENOXAPARIN SODIUM 30 MG: 100 INJECTION SUBCUTANEOUS at 08:21

## 2018-06-13 RX ADMIN — SODIUM BICARBONATE 3 ML: 84 INJECTION, SOLUTION INTRAVENOUS at 23:47

## 2018-06-13 RX ADMIN — IPRATROPIUM BROMIDE AND ALBUTEROL SULFATE 3 ML: .5; 3 SOLUTION RESPIRATORY (INHALATION) at 06:32

## 2018-06-13 RX ADMIN — FAMOTIDINE 20 MG: 20 TABLET ORAL at 08:21

## 2018-06-13 RX ADMIN — RIFAXIMIN 550 MG: 550 TABLET ORAL at 21:02

## 2018-06-13 RX ADMIN — SODIUM BICARBONATE 3 ML: 84 INJECTION, SOLUTION INTRAVENOUS at 06:33

## 2018-06-13 RX ADMIN — PIPERACILLIN SODIUM AND TAZOBACTAM SODIUM 3.38 G: 3; .375 INJECTION, POWDER, FOR SOLUTION INTRAVENOUS at 04:22

## 2018-06-13 RX ADMIN — IPRATROPIUM BROMIDE AND ALBUTEROL SULFATE 3 ML: .5; 3 SOLUTION RESPIRATORY (INHALATION) at 14:43

## 2018-06-13 RX ADMIN — OXYCODONE HYDROCHLORIDE 10 MG: 5 TABLET ORAL at 04:21

## 2018-06-13 RX ADMIN — ENOXAPARIN SODIUM 30 MG: 100 INJECTION SUBCUTANEOUS at 21:01

## 2018-06-13 NOTE — CARE PLAN
Problem: Bowel/Gastric:  Goal: Normal bowel function is maintained or improved  Outcome: PROGRESSING AS EXPECTED  Patient receiving scheduled lactulose for bowel movements to get rid of ammonia.     Problem: Skin Integrity  Goal: Risk for impaired skin integrity will decrease  Outcome: PROGRESSING AS EXPECTED  2 RN skin assessment completed at beginning of shift. Patient turned q2 hrs alternating pillows. Appropriate pressure ulcer prevention interventions in place.
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
## 2018-06-13 NOTE — CARE PLAN
Problem: Ventilation Defect:  Goal: Ability to achieve and maintain unassisted ventilation or tolerate decreased levels of ventilator support    Intervention: Support and monitor invasive and noninvasive mechanical ventilation  Adult Ventilation Update    Total Vent Days: 12    Patient Lines/Drains/Airways Status    Active Airway     Name: Placement date: Placement time: Site: Days:    Airway Group Portex Trach Tracheostomy 8.0 06/07/18   1055   Tracheostomy   6              APRV 24/0  4/.6  50%    Events/Summary/Plan: Patient remains stable on current vent settings. No changes made, will continue to monitor.

## 2018-06-13 NOTE — DISCHARGE PLANNING
Medical Social Work    MSW received call from RN stating that pt's daughter (Natalie Villalobos) needed a lodging letter and list.  MSW wrote letter and tubed it and lodging list to tube station #[phone].

## 2018-06-13 NOTE — PROGRESS NOTES
Dr. Covington at bedside to discuss patients status and POC with family. All questions answered, no further needs at this time.

## 2018-06-13 NOTE — EEG PROGRESS NOTE
VIDEO ELECTROENCEPHALOGRAM REPORT      Referring MD: Dr. Castillo.     DOS: 6/12/2018 (total recording of 25 minutes).     INDICATION:  Devonte Sotelo 63 y.o. male presenting with altered mental status.     CURRENT ANTIEPILEPTIC REGIMEN: None.     TECHNIQUE: 30 channel video electroencephalogram (EEG) was performed in accordance with the international 10-20 system. The study was reviewed in bipolar and referential montages. The recording examined an encephalopathic or comatose patient.     DESCRIPTION OF THE RECORD:  Diffuse 5 HZ theta activity, slower frequency suggestive of sleep with vertex waves. No changes in background with stimulation of the patient.     ACTIVATION PROCEDURES:   Not performed.     ICTAL AND/OR INTERICTAL FINDINGS:   No focal or generalized epileptiform activity noted. No regional slowing was seen during this routine study.  No clinical events or seizures were reported or recorded during the study.     EKG: sampling of the EKG recording demonstrated sinus rhythm.     EVENTS:  Unresponsive.     INTERPRETATION:  This is an abnormal video EEG recording in an encephalopathic or comatose patient. Moderate diffuse cerebral dysfunction, suggestive of an encephalopathy. There is no improvement of the eeg with stimulation of the patient. Clinical correlation is recommended.    Note: This EEG does not rule out epilepsy.  If the clinical suspicion remains high for seizures, a prolonged recording to capture clinical or subclinical events may be helpful.        Domenico Vanegas MD   Epilepsy and Neurodiagnostics.   Clinical  of Neurology [facility].   Diplomate in Neurology, Epilepsy, and Electrodiagnostic Medicine.   Office: [phone]  Fax: [phone]

## 2018-06-13 NOTE — CARE PLAN
Adult Ventilation Update    Total Vent Days: 13  Trach: 7    Patient Lines/Drains/Airways Status    Active Airway     Name: Placement date: Placement time: Site: Days:    Airway Group Portex Trach Tracheostomy 8.0 06/07/18   1055   Tracheostomy   7                   Plateau Pressure (Q Shift): 28 (06/09/18 0634)  Static Compliance (ml / cm H2O): 74.9 (06/13/18 0305)    Patient failed trials because of Barriers to Wean: FiO2 >60% or PEEP >10 CM H2O (06/10/18 0656)  Barriers to SBT    Length of Weaning Trial          Sputum/Suction   Cough: Productive (06/13/18 0400)  Sputum Amount: Moderate (06/13/18 0400)  Sputum Color: Yellow;Brown (06/13/18 0400)  Sputum Consistency: Thick;Thin (06/13/18 0400)    Mobility  Level of Mobility: Level I (06/12/18 2000)  Activity Performed: Unable to mobilize (06/12/18 2000)  Time Activity Tolerated: 45 min (05/31/18 2000)  Distance Per Occurrence (ft.): 6 feet (05/31/18 2000)  # of Times Distance was Traveled: 1 (05/31/18 2000)  Assistance / Tolerance: Assistance of Two or More (06/10/18 1600)  Pt Calls for Assistance: No (06/12/18 2000)  Staff Present for Mobilization: RN (06/10/18 1600)  Gait: Steady (06/01/18 0800)  Assistive Devices: Hand held assist (06/01/18 0800)  Reason Not Mobilized: Unstable condition (06/12/18 2000)  Mobilization Comments:  (RAAS -5) (06/10/18 2000)    Events/Summary/Plan: Patient remains stable on current vent settings. No changes made, will continue to monitor

## 2018-06-13 NOTE — PROCEDURES
VIDEO ELECTROENCEPHALOGRAM REPORT        Referring MD: Dr. Castillo.      DOS: 6/12/2018 (total recording of 25 minutes).      INDICATION:  Devonte Sotelo 63 y.o. male presenting with altered mental status.      CURRENT ANTIEPILEPTIC REGIMEN: None.      TECHNIQUE: 30 channel video electroencephalogram (EEG) was performed in accordance with the international 10-20 system. The study was reviewed in bipolar and referential montages. The recording examined an encephalopathic or comatose patient.      DESCRIPTION OF THE RECORD:  Diffuse 5 HZ theta activity, slower frequency suggestive of sleep with vertex waves. No changes in background with stimulation of the patient.      ACTIVATION PROCEDURES:   Not performed.      ICTAL AND/OR INTERICTAL FINDINGS:   No focal or generalized epileptiform activity noted. No regional slowing was seen during this routine study.  No clinical events or seizures were reported or recorded during the study.      EKG: sampling of the EKG recording demonstrated sinus rhythm.      EVENTS:  Unresponsive.      INTERPRETATION:  This is an abnormal video EEG recording in an encephalopathic or comatose patient. Moderate diffuse cerebral dysfunction, suggestive of an encephalopathy. There is no improvement of the eeg with stimulation of the patient. Clinical correlation is recommended.     Note: This EEG does not rule out epilepsy.  If the clinical suspicion remains high for seizures, a prolonged recording to capture clinical or subclinical events may be helpful.           Domenico Vanegas MD   Epilepsy and Neurodiagnostics.   Clinical  of Neurology [facility].   Diplomate in Neurology, Epilepsy, and Electrodiagnostic Medicine.   Office: [phone]  Fax: [phone]    ALEKSEY ANAND    DD:  06/12/2018 17:42:04  DT:  06/12/2018 17:57:18    D#:  [id number]  Job#:  [id number]
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
## 2018-06-14 ENCOUNTER — APPOINTMENT (OUTPATIENT)
Dept: RADIOLOGY | Facility: MEDICAL CENTER | Age: 63
DRG: 003 | End: 2018-06-14
Attending: PSYCHIATRY & NEUROLOGY
Payer: COMMERCIAL

## 2018-06-14 ENCOUNTER — APPOINTMENT (OUTPATIENT)
Dept: RADIOLOGY | Facility: MEDICAL CENTER | Age: 63
DRG: 003 | End: 2018-06-14
Attending: SURGERY
Payer: COMMERCIAL

## 2018-06-14 PROBLEM — J32.9 SINUSITIS: Status: RESOLVED | Noted: 2018-06-09 | Resolved: 2018-06-14

## 2018-06-14 LAB
ACTION RANGE TRIGGERED IACRT: NO
ALBUMIN SERPL BCP-MCNC: 2.5 G/DL (ref 3.2–4.9)
ALBUMIN/GLOB SERPL: 0.8 G/DL
ALP SERPL-CCNC: 75 U/L (ref 30–99)
ALT SERPL-CCNC: 153 U/L (ref 2–50)
ANION GAP SERPL CALC-SCNC: 8 MMOL/L (ref 0–11.9)
AST SERPL-CCNC: 77 U/L (ref 12–45)
BASE EXCESS BLDA CALC-SCNC: 7 MMOL/L (ref -4–3)
BILIRUB SERPL-MCNC: 1.2 MG/DL (ref 0.1–1.5)
BODY TEMPERATURE: ABNORMAL DEGREES
BUN SERPL-MCNC: 49 MG/DL (ref 8–22)
CALCIUM SERPL-MCNC: 7.6 MG/DL (ref 8.5–10.5)
CHLORIDE SERPL-SCNC: 116 MMOL/L (ref 96–112)
CO2 BLDA-SCNC: 32 MMOL/L (ref 20–33)
CO2 SERPL-SCNC: 30 MMOL/L (ref 20–33)
CREAT SERPL-MCNC: 1.11 MG/DL (ref 0.5–1.4)
GLOBULIN SER CALC-MCNC: 3.3 G/DL (ref 1.9–3.5)
GLUCOSE SERPL-MCNC: 134 MG/DL (ref 65–99)
HCO3 BLDA-SCNC: 30.8 MMOL/L (ref 17–25)
INST. QUALIFIED PATIENT IIQPT: YES
LV EJECT FRACT  99904: 70
LV EJECT FRACT MOD 2C 99903: 59.71
LV EJECT FRACT MOD 4C 99902: 71
LV EJECT FRACT MOD BP 99901: 69.48
MAGNESIUM SERPL-MCNC: 2.3 MG/DL (ref 1.5–2.5)
O2/TOTAL GAS SETTING VFR VENT: 50 %
PCO2 BLDA: 41 MMHG (ref 26–37)
PCO2 TEMP ADJ BLDA: 42.5 MMHG (ref 26–37)
PH BLDA: 7.48 [PH] (ref 7.4–7.5)
PH TEMP ADJ BLDA: 7.47 [PH] (ref 7.4–7.5)
PHOSPHATE SERPL-MCNC: 3.4 MG/DL (ref 2.5–4.5)
PO2 BLDA: 70 MMHG (ref 64–87)
PO2 TEMP ADJ BLDA: 74 MMHG (ref 64–87)
POTASSIUM SERPL-SCNC: 3 MMOL/L (ref 3.6–5.5)
PROT SERPL-MCNC: 5.8 G/DL (ref 6–8.2)
SAO2 % BLDA: 95 % (ref 93–99)
SODIUM SERPL-SCNC: 154 MMOL/L (ref 135–145)
SPECIMEN DRAWN FROM PATIENT: ABNORMAL

## 2018-06-14 PROCEDURE — 700102 HCHG RX REV CODE 250 W/ 637 OVERRIDE(OP): Performed by: SURGERY

## 2018-06-14 PROCEDURE — A9270 NON-COVERED ITEM OR SERVICE: HCPCS | Performed by: SURGERY

## 2018-06-14 PROCEDURE — 84100 ASSAY OF PHOSPHORUS: CPT

## 2018-06-14 PROCEDURE — 72146 MRI CHEST SPINE W/O DYE: CPT

## 2018-06-14 PROCEDURE — 99291 CRITICAL CARE FIRST HOUR: CPT | Performed by: SURGERY

## 2018-06-14 PROCEDURE — 94640 AIRWAY INHALATION TREATMENT: CPT

## 2018-06-14 PROCEDURE — 72141 MRI NECK SPINE W/O DYE: CPT

## 2018-06-14 PROCEDURE — 700111 HCHG RX REV CODE 636 W/ 250 OVERRIDE (IP): Performed by: SURGERY

## 2018-06-14 PROCEDURE — 700112 HCHG RX REV CODE 229: Performed by: SURGERY

## 2018-06-14 PROCEDURE — 80053 COMPREHEN METABOLIC PANEL: CPT

## 2018-06-14 PROCEDURE — 82803 BLOOD GASES ANY COMBINATION: CPT

## 2018-06-14 PROCEDURE — 93306 TTE W/DOPPLER COMPLETE: CPT

## 2018-06-14 PROCEDURE — 36600 WITHDRAWAL OF ARTERIAL BLOOD: CPT

## 2018-06-14 PROCEDURE — 71045 X-RAY EXAM CHEST 1 VIEW: CPT

## 2018-06-14 PROCEDURE — 93306 TTE W/DOPPLER COMPLETE: CPT | Mod: 26 | Performed by: INTERNAL MEDICINE

## 2018-06-14 PROCEDURE — 83735 ASSAY OF MAGNESIUM: CPT

## 2018-06-14 PROCEDURE — 700101 HCHG RX REV CODE 250: Performed by: SURGERY

## 2018-06-14 PROCEDURE — 94003 VENT MGMT INPAT SUBQ DAY: CPT

## 2018-06-14 PROCEDURE — 770022 HCHG ROOM/CARE - ICU (200)

## 2018-06-14 RX ORDER — POTASSIUM CHLORIDE 29.8 MG/ML
40 INJECTION INTRAVENOUS ONCE
Status: COMPLETED | OUTPATIENT
Start: 2018-06-14 | End: 2018-06-14

## 2018-06-14 RX ADMIN — IPRATROPIUM BROMIDE AND ALBUTEROL SULFATE 3 ML: .5; 3 SOLUTION RESPIRATORY (INHALATION) at 03:55

## 2018-06-14 RX ADMIN — ENOXAPARIN SODIUM 30 MG: 100 INJECTION SUBCUTANEOUS at 08:57

## 2018-06-14 RX ADMIN — ENOXAPARIN SODIUM 30 MG: 100 INJECTION SUBCUTANEOUS at 21:37

## 2018-06-14 RX ADMIN — CEFAZOLIN SODIUM 2 G: 2 INJECTION, SOLUTION INTRAVENOUS at 15:18

## 2018-06-14 RX ADMIN — CEFAZOLIN SODIUM 2 G: 2 INJECTION, SOLUTION INTRAVENOUS at 05:36

## 2018-06-14 RX ADMIN — POLYETHYLENE GLYCOL 3350 1 PACKET: 17 POWDER, FOR SOLUTION ORAL at 21:37

## 2018-06-14 RX ADMIN — LABETALOL HYDROCHLORIDE 10 MG: 5 INJECTION INTRAVENOUS at 02:17

## 2018-06-14 RX ADMIN — LABETALOL HYDROCHLORIDE 10 MG: 5 INJECTION INTRAVENOUS at 22:02

## 2018-06-14 RX ADMIN — ONDANSETRON HYDROCHLORIDE 4 MG: 2 INJECTION, SOLUTION INTRAMUSCULAR; INTRAVENOUS at 02:08

## 2018-06-14 RX ADMIN — IPRATROPIUM BROMIDE AND ALBUTEROL SULFATE 3 ML: .5; 3 SOLUTION RESPIRATORY (INHALATION) at 19:05

## 2018-06-14 RX ADMIN — POTASSIUM CHLORIDE 40 MEQ: 400 INJECTION, SOLUTION INTRAVENOUS at 15:16

## 2018-06-14 RX ADMIN — IPRATROPIUM BROMIDE AND ALBUTEROL SULFATE 3 ML: .5; 3 SOLUTION RESPIRATORY (INHALATION) at 22:38

## 2018-06-14 RX ADMIN — SODIUM BICARBONATE 3 ML: 84 INJECTION, SOLUTION INTRAVENOUS at 03:55

## 2018-06-14 RX ADMIN — FAMOTIDINE 20 MG: 20 TABLET ORAL at 08:57

## 2018-06-14 RX ADMIN — FAMOTIDINE 20 MG: 20 TABLET ORAL at 21:36

## 2018-06-14 RX ADMIN — IPRATROPIUM BROMIDE AND ALBUTEROL SULFATE 3 ML: .5; 3 SOLUTION RESPIRATORY (INHALATION) at 10:41

## 2018-06-14 RX ADMIN — LACTULOSE 30 ML: 20 SOLUTION ORAL at 09:04

## 2018-06-14 RX ADMIN — RIFAXIMIN 550 MG: 550 TABLET ORAL at 21:37

## 2018-06-14 RX ADMIN — DOCUSATE SODIUM 100 MG: 50 LIQUID ORAL at 21:36

## 2018-06-14 RX ADMIN — DOCUSATE SODIUM 100 MG: 50 LIQUID ORAL at 08:57

## 2018-06-14 RX ADMIN — OXYCODONE HYDROCHLORIDE 10 MG: 5 TABLET ORAL at 15:41

## 2018-06-14 RX ADMIN — RIFAXIMIN 550 MG: 550 TABLET ORAL at 08:57

## 2018-06-14 RX ADMIN — OXYCODONE HYDROCHLORIDE 10 MG: 5 TABLET ORAL at 08:00

## 2018-06-14 RX ADMIN — LACTULOSE 30 ML: 20 SOLUTION ORAL at 15:41

## 2018-06-14 RX ADMIN — SENNOSIDES AND DOCUSATE SODIUM 1 TABLET: 8.6; 5 TABLET ORAL at 21:36

## 2018-06-14 RX ADMIN — SODIUM BICARBONATE 2 ML: 84 INJECTION, SOLUTION INTRAVENOUS at 06:45

## 2018-06-14 RX ADMIN — LACTULOSE 30 ML: 20 SOLUTION ORAL at 21:36

## 2018-06-14 RX ADMIN — IPRATROPIUM BROMIDE AND ALBUTEROL SULFATE 3 ML: .5; 3 SOLUTION RESPIRATORY (INHALATION) at 06:45

## 2018-06-14 RX ADMIN — CEFAZOLIN SODIUM 2 G: 2 INJECTION, SOLUTION INTRAVENOUS at 21:37

## 2018-06-14 RX ADMIN — IPRATROPIUM BROMIDE AND ALBUTEROL SULFATE 3 ML: .5; 3 SOLUTION RESPIRATORY (INHALATION) at 15:25

## 2018-06-14 NOTE — CARE PLAN
Problem: Ventilation Defect:  Goal: Ability to achieve and maintain unassisted ventilation or tolerate decreased levels of ventilator support    Intervention: Support and monitor invasive and noninvasive mechanical ventilation  Adult Ventilation Update    Total Vent Days: 13  Total Trach Days: 7  Vent:  x 20, 60% +10    Patient Lines/Drains/Airways Status    Active Airway     Name: Placement date: Placement time: Site: Days:    Airway Group Portex Trach Tracheostomy 8.0 06/07/18   1055   Tracheostomy   7              Mobility  Activity Performed: Unable to mobilize     Events/Summary/Plan: Patient stable on vent. Changed from APRV to CMV today. Tolerating well. No SBT.

## 2018-06-14 NOTE — PROGRESS NOTES
Dr Covington at bedside to remove R chest tube. Pt tolerated Well. Pt's wife Reshma at [facility] and updated on plan of care by Dr Covington.
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
## 2018-06-14 NOTE — PROGRESS NOTES
Pt's wife Cam at bedside and updated on patient condition, Cam does not want to establish a password at this time but would like limited patient information given out to patients frequent visitors. She will be the point person if family and friends have questions and concerns. I informed Cam that we would do everything that we could to accommodate this and if it became an issue we could establish a password.

## 2018-06-14 NOTE — CARE PLAN
Problem: Pain Management  Goal: Pain level will decrease to patient's comfort goal  Pt medicated per MAR and active MD order. Pain assessment documented in flowsheets.     Problem: Skin Integrity  Goal: Risk for impaired skin integrity will decrease  Q 2 hour turing in place, pillows in use for support and positioning.tube feeds at goal.

## 2018-06-14 NOTE — CARE PLAN
Problem: Ventilation Defect:  Goal: Ability to achieve and maintain unassisted ventilation or tolerate decreased levels of ventilator support    Intervention: Support and monitor invasive and noninvasive mechanical ventilation  Adult Ventilation Update    Total Vent Days: 14    Patient Lines/Drains/Airways Status    Active Airway     Name: Placement date: Placement time: Site: Days:    Airway Group Portex Trach Tracheostomy 8.0 06/07/18   1055   Tracheostomy   8               Plateau Pressure (Q Shift): 22 (06/14/18 0356)  Static Compliance (ml / cm H2O): 42 (06/14/18 0356)    8 day post trache.      Sputum/Suction yes  Cough: Productive (06/14/18 0356)  Sputum Amount: Small (06/14/18 0356)  Sputum Color: Bloody;Brown (06/14/18 0356)  Sputum Consistency: Thick;Thin (06/14/18 0356)    Events/Summary/Plan: Inline svn done.  Pt remains on APV-CMV, still tolerating mode well. Will continue to monitor.        Problem: Bronchoconstriction:  Goal: Improve in air movement and diminished wheezing  Outcome: PROGRESSING AS EXPECTED  Pt is on duoneb Q4

## 2018-06-14 NOTE — CARE PLAN
Problem: Infection  Goal: Will remain free from infection  Outcome: PROGRESSING AS EXPECTED    Intervention: Implement standard precautions and perform hand washing before and after patient contact  Hand hygiene performed before and after assessing patient. Daily CHG bath completed. Scrub the hub prior to accessing IVs. Linens changed daily and PRN when soiled.       Problem: Skin Integrity  Goal: Risk for impaired skin integrity will decrease  Outcome: PROGRESSING AS EXPECTED    Intervention: Implement precautions to protect skin integrity in collaboration with the interdisciplinary team   06/12/18 2000 06/13/18 0800 06/13/18 1800   OTHER   Skin Preventative Measures --  Pillows in Use to Float Heels;Pillows in Use for Support / Positioning --    Bed Types --  Low Air Loss Mattress --    Friction Interventions --  Draw Sheet / Pad Used for Repositioning --    Moisturizers --  Moisturizer  --    Containment Devices --  Nasal Trumpet as Rectal Tube --    PT / OT Involved in Care Order has been Placed --  --    Activity  --  Bed --    Patient Turns / Repositioning --  --  Supine   Assistance / Tolerance for Turning/Repositioning --  --  Assistance of Two or More   Patient is Receiving Nutrition --  Nothing by Mouth;Tube Feeding Nutrition --    Nutrition Consult Ordered --  No, Consult has Already been Placed --    Vitamin Therapy in Use --  No --      Patient turned Q2H. Pillows used for support and repositioning. Draw sheet used to decrease friction. Mepilex in place on sacrum.
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
## 2018-06-15 ENCOUNTER — APPOINTMENT (OUTPATIENT)
Dept: RADIOLOGY | Facility: MEDICAL CENTER | Age: 63
DRG: 003 | End: 2018-06-15
Attending: SURGERY
Payer: COMMERCIAL

## 2018-06-15 LAB
ALBUMIN SERPL BCP-MCNC: 2.5 G/DL (ref 3.2–4.9)
ALBUMIN/GLOB SERPL: 0.7 G/DL
ALP SERPL-CCNC: 93 U/L (ref 30–99)
ALT SERPL-CCNC: 99 U/L (ref 2–50)
ANION GAP SERPL CALC-SCNC: 8 MMOL/L (ref 0–11.9)
AST SERPL-CCNC: 56 U/L (ref 12–45)
BASOPHILS # BLD AUTO: 0.3 % (ref 0–1.8)
BASOPHILS # BLD: 0.06 K/UL (ref 0–0.12)
BILIRUB SERPL-MCNC: 1 MG/DL (ref 0.1–1.5)
BUN SERPL-MCNC: 48 MG/DL (ref 8–22)
CALCIUM SERPL-MCNC: 8 MG/DL (ref 8.5–10.5)
CHLORIDE SERPL-SCNC: 116 MMOL/L (ref 96–112)
CK SERPL-CCNC: 161 U/L (ref 0–154)
CO2 SERPL-SCNC: 30 MMOL/L (ref 20–33)
CREAT SERPL-MCNC: 1.07 MG/DL (ref 0.5–1.4)
EOSINOPHIL # BLD AUTO: 0.14 K/UL (ref 0–0.51)
EOSINOPHIL NFR BLD: 0.8 % (ref 0–6.9)
ERYTHROCYTE [DISTWIDTH] IN BLOOD BY AUTOMATED COUNT: 53.8 FL (ref 35.9–50)
GLOBULIN SER CALC-MCNC: 3.5 G/DL (ref 1.9–3.5)
GLUCOSE SERPL-MCNC: 144 MG/DL (ref 65–99)
HCT VFR BLD AUTO: 26.7 % (ref 42–52)
HGB BLD-MCNC: 8.1 G/DL (ref 14–18)
IMM GRANULOCYTES # BLD AUTO: 0.3 K/UL (ref 0–0.11)
IMM GRANULOCYTES NFR BLD AUTO: 1.7 % (ref 0–0.9)
LYMPHOCYTES # BLD AUTO: 2.23 K/UL (ref 1–4.8)
LYMPHOCYTES NFR BLD: 12.7 % (ref 22–41)
MCH RBC QN AUTO: 30.5 PG (ref 27–33)
MCHC RBC AUTO-ENTMCNC: 30.3 G/DL (ref 33.7–35.3)
MCV RBC AUTO: 100.4 FL (ref 81.4–97.8)
MONOCYTES # BLD AUTO: 1.06 K/UL (ref 0–0.85)
MONOCYTES NFR BLD AUTO: 6 % (ref 0–13.4)
NEUTROPHILS # BLD AUTO: 13.81 K/UL (ref 1.82–7.42)
NEUTROPHILS NFR BLD: 78.5 % (ref 44–72)
NRBC # BLD AUTO: 0.03 K/UL
NRBC BLD-RTO: 0.2 /100 WBC
PLATELET # BLD AUTO: 479 K/UL (ref 164–446)
PMV BLD AUTO: 12.2 FL (ref 9–12.9)
POTASSIUM SERPL-SCNC: 3 MMOL/L (ref 3.6–5.5)
PROT SERPL-MCNC: 6 G/DL (ref 6–8.2)
RBC # BLD AUTO: 2.66 M/UL (ref 4.7–6.1)
SODIUM SERPL-SCNC: 154 MMOL/L (ref 135–145)
WBC # BLD AUTO: 17.6 K/UL (ref 4.8–10.8)

## 2018-06-15 PROCEDURE — 80053 COMPREHEN METABOLIC PANEL: CPT

## 2018-06-15 PROCEDURE — 700105 HCHG RX REV CODE 258: Performed by: SURGERY

## 2018-06-15 PROCEDURE — A9270 NON-COVERED ITEM OR SERVICE: HCPCS | Performed by: SURGERY

## 2018-06-15 PROCEDURE — 700102 HCHG RX REV CODE 250 W/ 637 OVERRIDE(OP): Performed by: SURGERY

## 2018-06-15 PROCEDURE — A6206 CONTACT LAYER <= 16 SQ IN: HCPCS | Performed by: SURGERY

## 2018-06-15 PROCEDURE — 82550 ASSAY OF CK (CPK): CPT

## 2018-06-15 PROCEDURE — 700112 HCHG RX REV CODE 229: Performed by: SURGERY

## 2018-06-15 PROCEDURE — 770022 HCHG ROOM/CARE - ICU (200)

## 2018-06-15 PROCEDURE — 99291 CRITICAL CARE FIRST HOUR: CPT | Performed by: SURGERY

## 2018-06-15 PROCEDURE — 700111 HCHG RX REV CODE 636 W/ 250 OVERRIDE (IP): Performed by: SURGERY

## 2018-06-15 PROCEDURE — 700101 HCHG RX REV CODE 250: Performed by: SURGERY

## 2018-06-15 PROCEDURE — 71045 X-RAY EXAM CHEST 1 VIEW: CPT

## 2018-06-15 PROCEDURE — 94003 VENT MGMT INPAT SUBQ DAY: CPT

## 2018-06-15 PROCEDURE — 85025 COMPLETE CBC W/AUTO DIFF WBC: CPT

## 2018-06-15 PROCEDURE — 94640 AIRWAY INHALATION TREATMENT: CPT

## 2018-06-15 RX ORDER — POTASSIUM CHLORIDE 29.8 MG/ML
40 INJECTION INTRAVENOUS 2 TIMES DAILY
Status: COMPLETED | OUTPATIENT
Start: 2018-06-15 | End: 2018-06-15

## 2018-06-15 RX ORDER — AMLODIPINE BESYLATE 10 MG/1
5 TABLET ORAL
Status: DISCONTINUED | OUTPATIENT
Start: 2018-06-15 | End: 2018-07-04 | Stop reason: HOSPADM

## 2018-06-15 RX ORDER — SODIUM CHLORIDE, SODIUM LACTATE, POTASSIUM CHLORIDE, CALCIUM CHLORIDE 600; 310; 30; 20 MG/100ML; MG/100ML; MG/100ML; MG/100ML
INJECTION, SOLUTION INTRAVENOUS CONTINUOUS
Status: DISCONTINUED | OUTPATIENT
Start: 2018-06-15 | End: 2018-06-17

## 2018-06-15 RX ADMIN — SODIUM CHLORIDE, POTASSIUM CHLORIDE, SODIUM LACTATE AND CALCIUM CHLORIDE: 600; 310; 30; 20 INJECTION, SOLUTION INTRAVENOUS at 19:53

## 2018-06-15 RX ADMIN — CEFAZOLIN SODIUM 2 G: 2 INJECTION, SOLUTION INTRAVENOUS at 23:47

## 2018-06-15 RX ADMIN — AMLODIPINE BESYLATE 5 MG: 10 TABLET ORAL at 12:15

## 2018-06-15 RX ADMIN — ENOXAPARIN SODIUM 30 MG: 100 INJECTION SUBCUTANEOUS at 19:40

## 2018-06-15 RX ADMIN — IPRATROPIUM BROMIDE AND ALBUTEROL SULFATE 3 ML: .5; 3 SOLUTION RESPIRATORY (INHALATION) at 14:32

## 2018-06-15 RX ADMIN — CEFAZOLIN SODIUM 2 G: 2 INJECTION, SOLUTION INTRAVENOUS at 05:26

## 2018-06-15 RX ADMIN — POTASSIUM CHLORIDE 40 MEQ: 400 INJECTION, SOLUTION INTRAVENOUS at 09:49

## 2018-06-15 RX ADMIN — IPRATROPIUM BROMIDE AND ALBUTEROL SULFATE 3 ML: .5; 3 SOLUTION RESPIRATORY (INHALATION) at 03:12

## 2018-06-15 RX ADMIN — FAMOTIDINE 20 MG: 20 TABLET ORAL at 09:52

## 2018-06-15 RX ADMIN — IPRATROPIUM BROMIDE AND ALBUTEROL SULFATE 3 ML: .5; 3 SOLUTION RESPIRATORY (INHALATION) at 09:18

## 2018-06-15 RX ADMIN — LACTULOSE 30 ML: 20 SOLUTION ORAL at 20:00

## 2018-06-15 RX ADMIN — LACTULOSE 30 ML: 20 SOLUTION ORAL at 09:57

## 2018-06-15 RX ADMIN — DOCUSATE SODIUM 100 MG: 50 LIQUID ORAL at 09:52

## 2018-06-15 RX ADMIN — RIFAXIMIN 550 MG: 550 TABLET ORAL at 09:55

## 2018-06-15 RX ADMIN — IPRATROPIUM BROMIDE AND ALBUTEROL SULFATE 3 ML: .5; 3 SOLUTION RESPIRATORY (INHALATION) at 07:09

## 2018-06-15 RX ADMIN — IPRATROPIUM BROMIDE AND ALBUTEROL SULFATE 3 ML: .5; 3 SOLUTION RESPIRATORY (INHALATION) at 18:34

## 2018-06-15 RX ADMIN — POTASSIUM CHLORIDE 40 MEQ: 400 INJECTION, SOLUTION INTRAVENOUS at 19:40

## 2018-06-15 RX ADMIN — LACTULOSE 30 ML: 20 SOLUTION ORAL at 15:00

## 2018-06-15 RX ADMIN — RIFAXIMIN 550 MG: 550 TABLET ORAL at 19:40

## 2018-06-15 RX ADMIN — ENOXAPARIN SODIUM 30 MG: 100 INJECTION SUBCUTANEOUS at 09:52

## 2018-06-15 RX ADMIN — OXYCODONE HYDROCHLORIDE 15 MG: 5 TABLET ORAL at 14:31

## 2018-06-15 RX ADMIN — IPRATROPIUM BROMIDE AND ALBUTEROL SULFATE 3 ML: .5; 3 SOLUTION RESPIRATORY (INHALATION) at 22:45

## 2018-06-15 RX ADMIN — OXYCODONE HYDROCHLORIDE 10 MG: 5 TABLET ORAL at 05:26

## 2018-06-15 RX ADMIN — CEFAZOLIN SODIUM 2 G: 2 INJECTION, SOLUTION INTRAVENOUS at 15:30

## 2018-06-15 RX ADMIN — FAMOTIDINE 20 MG: 20 TABLET ORAL at 19:40
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
## 2018-06-15 NOTE — CARE PLAN
Problem: Ventilation Defect:  Goal: Ability to achieve and maintain unassisted ventilation or tolerate decreased levels of ventilator support  Outcome: PROGRESSING AS EXPECTED    Intervention: Support and monitor invasive and noninvasive mechanical ventilation  Adult Ventilation Update    Total Vent Days: 15  Trach Day: 9    Patient Lines/Drains/Airways Status    Active Airway     Name: Placement date: Placement time: Site: Days:    Airway Group Portex Trach Tracheostomy 8.0 06/07/18   1055   Tracheostomy   7                  Plateau Pressure (Q Shift): 21 (06/14/18 1859)  Static Compliance (ml / cm H2O): 60.3 (06/15/18 0309)    Pt remains on vent with changes made overnight.
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
## 2018-06-15 NOTE — CARE PLAN
Problem: Ventilation Defect:  Goal: Ability to achieve and maintain unassisted ventilation or tolerate decreased levels of ventilator support    Intervention: Support and monitor invasive and noninvasive mechanical ventilation  Adult Ventilation Update    Total Vent Days: 14  Total Trach Days: 8  Vent:  x 20, 60% +10    Patient Lines/Drains/Airways Status    Active Airway     Name: Placement date: Placement time: Site: Days:    Airway Group Portex Trach Tracheostomy 8.0 06/07/18   1055   Tracheostomy   8              Mobility  Activity Performed: Unable to mobilize     Events/Summary/Plan: Patient stable on vent. Patient more alert today. MRI trip today.
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
## 2018-06-16 ENCOUNTER — APPOINTMENT (OUTPATIENT)
Dept: RADIOLOGY | Facility: MEDICAL CENTER | Age: 63
DRG: 003 | End: 2018-06-16
Attending: SURGERY
Payer: COMMERCIAL

## 2018-06-16 LAB
ALBUMIN SERPL BCP-MCNC: 2.6 G/DL (ref 3.2–4.9)
ALBUMIN/GLOB SERPL: 0.8 G/DL
ALP SERPL-CCNC: 94 U/L (ref 30–99)
ALT SERPL-CCNC: 56 U/L (ref 2–50)
ANION GAP SERPL CALC-SCNC: 8 MMOL/L (ref 0–11.9)
AST SERPL-CCNC: 40 U/L (ref 12–45)
BASOPHILS # BLD AUTO: 0.3 % (ref 0–1.8)
BASOPHILS # BLD: 0.06 K/UL (ref 0–0.12)
BILIRUB SERPL-MCNC: 1 MG/DL (ref 0.1–1.5)
BUN SERPL-MCNC: 46 MG/DL (ref 8–22)
CALCIUM SERPL-MCNC: 7.8 MG/DL (ref 8.5–10.5)
CHLORIDE SERPL-SCNC: 116 MMOL/L (ref 96–112)
CO2 SERPL-SCNC: 29 MMOL/L (ref 20–33)
CREAT SERPL-MCNC: 1.03 MG/DL (ref 0.5–1.4)
EOSINOPHIL # BLD AUTO: 0.21 K/UL (ref 0–0.51)
EOSINOPHIL NFR BLD: 1.1 % (ref 0–6.9)
ERYTHROCYTE [DISTWIDTH] IN BLOOD BY AUTOMATED COUNT: 51.9 FL (ref 35.9–50)
GLOBULIN SER CALC-MCNC: 3.4 G/DL (ref 1.9–3.5)
GLUCOSE SERPL-MCNC: 135 MG/DL (ref 65–99)
HCT VFR BLD AUTO: 26.4 % (ref 42–52)
HGB BLD-MCNC: 8.2 G/DL (ref 14–18)
IMM GRANULOCYTES # BLD AUTO: 0.25 K/UL (ref 0–0.11)
IMM GRANULOCYTES NFR BLD AUTO: 1.3 % (ref 0–0.9)
LYMPHOCYTES # BLD AUTO: 2.56 K/UL (ref 1–4.8)
LYMPHOCYTES NFR BLD: 13.4 % (ref 22–41)
MCH RBC QN AUTO: 30.7 PG (ref 27–33)
MCHC RBC AUTO-ENTMCNC: 31.1 G/DL (ref 33.7–35.3)
MCV RBC AUTO: 98.9 FL (ref 81.4–97.8)
MONOCYTES # BLD AUTO: 1.27 K/UL (ref 0–0.85)
MONOCYTES NFR BLD AUTO: 6.6 % (ref 0–13.4)
NEUTROPHILS # BLD AUTO: 14.75 K/UL (ref 1.82–7.42)
NEUTROPHILS NFR BLD: 77.3 % (ref 44–72)
NRBC # BLD AUTO: 0 K/UL
NRBC BLD-RTO: 0 /100 WBC
PLATELET # BLD AUTO: 446 K/UL (ref 164–446)
PMV BLD AUTO: 11.5 FL (ref 9–12.9)
POTASSIUM SERPL-SCNC: 3.4 MMOL/L (ref 3.6–5.5)
PROT SERPL-MCNC: 6 G/DL (ref 6–8.2)
RBC # BLD AUTO: 2.67 M/UL (ref 4.7–6.1)
SODIUM SERPL-SCNC: 153 MMOL/L (ref 135–145)
WBC # BLD AUTO: 19.1 K/UL (ref 4.8–10.8)

## 2018-06-16 PROCEDURE — 94640 AIRWAY INHALATION TREATMENT: CPT

## 2018-06-16 PROCEDURE — 700102 HCHG RX REV CODE 250 W/ 637 OVERRIDE(OP): Performed by: SURGERY

## 2018-06-16 PROCEDURE — 700112 HCHG RX REV CODE 229: Performed by: SURGERY

## 2018-06-16 PROCEDURE — A9270 NON-COVERED ITEM OR SERVICE: HCPCS | Performed by: SURGERY

## 2018-06-16 PROCEDURE — 71045 X-RAY EXAM CHEST 1 VIEW: CPT

## 2018-06-16 PROCEDURE — 85025 COMPLETE CBC W/AUTO DIFF WBC: CPT

## 2018-06-16 PROCEDURE — 770022 HCHG ROOM/CARE - ICU (200)

## 2018-06-16 PROCEDURE — 700111 HCHG RX REV CODE 636 W/ 250 OVERRIDE (IP): Performed by: SURGERY

## 2018-06-16 PROCEDURE — 94150 VITAL CAPACITY TEST: CPT

## 2018-06-16 PROCEDURE — 700105 HCHG RX REV CODE 258: Performed by: SURGERY

## 2018-06-16 PROCEDURE — 80053 COMPREHEN METABOLIC PANEL: CPT

## 2018-06-16 PROCEDURE — 94003 VENT MGMT INPAT SUBQ DAY: CPT

## 2018-06-16 PROCEDURE — 700101 HCHG RX REV CODE 250: Performed by: SURGERY

## 2018-06-16 PROCEDURE — 99291 CRITICAL CARE FIRST HOUR: CPT | Performed by: SURGERY

## 2018-06-16 RX ORDER — POTASSIUM CHLORIDE 29.8 MG/ML
40 INJECTION INTRAVENOUS ONCE
Status: COMPLETED | OUTPATIENT
Start: 2018-06-16 | End: 2018-06-16

## 2018-06-16 RX ADMIN — IPRATROPIUM BROMIDE AND ALBUTEROL SULFATE 3 ML: .5; 3 SOLUTION RESPIRATORY (INHALATION) at 11:02

## 2018-06-16 RX ADMIN — IPRATROPIUM BROMIDE AND ALBUTEROL SULFATE 3 ML: .5; 3 SOLUTION RESPIRATORY (INHALATION) at 22:36

## 2018-06-16 RX ADMIN — POTASSIUM CHLORIDE 40 MEQ: 29.8 INJECTION, SOLUTION INTRAVENOUS at 14:47

## 2018-06-16 RX ADMIN — ENOXAPARIN SODIUM 30 MG: 100 INJECTION SUBCUTANEOUS at 09:40

## 2018-06-16 RX ADMIN — LACTULOSE 30 ML: 20 SOLUTION ORAL at 15:00

## 2018-06-16 RX ADMIN — ENOXAPARIN SODIUM 30 MG: 100 INJECTION SUBCUTANEOUS at 20:57

## 2018-06-16 RX ADMIN — IPRATROPIUM BROMIDE AND ALBUTEROL SULFATE 3 ML: .5; 3 SOLUTION RESPIRATORY (INHALATION) at 15:41

## 2018-06-16 RX ADMIN — FAMOTIDINE 20 MG: 20 TABLET ORAL at 09:39

## 2018-06-16 RX ADMIN — IPRATROPIUM BROMIDE AND ALBUTEROL SULFATE 3 ML: .5; 3 SOLUTION RESPIRATORY (INHALATION) at 02:44

## 2018-06-16 RX ADMIN — CEFAZOLIN SODIUM 2 G: 2 INJECTION, SOLUTION INTRAVENOUS at 05:10

## 2018-06-16 RX ADMIN — DOCUSATE SODIUM 100 MG: 50 LIQUID ORAL at 09:39

## 2018-06-16 RX ADMIN — IPRATROPIUM BROMIDE AND ALBUTEROL SULFATE 3 ML: .5; 3 SOLUTION RESPIRATORY (INHALATION) at 07:12

## 2018-06-16 RX ADMIN — FAMOTIDINE 20 MG: 20 TABLET ORAL at 20:57

## 2018-06-16 RX ADMIN — OXYCODONE HYDROCHLORIDE 10 MG: 5 TABLET ORAL at 16:15

## 2018-06-16 RX ADMIN — RIFAXIMIN 550 MG: 550 TABLET ORAL at 20:57

## 2018-06-16 RX ADMIN — CEFAZOLIN SODIUM 2 G: 2 INJECTION, SOLUTION INTRAVENOUS at 15:00

## 2018-06-16 RX ADMIN — LACTULOSE 30 ML: 20 SOLUTION ORAL at 20:57

## 2018-06-16 RX ADMIN — LACTULOSE 30 ML: 20 SOLUTION ORAL at 09:39

## 2018-06-16 RX ADMIN — RIFAXIMIN 550 MG: 550 TABLET ORAL at 09:39

## 2018-06-16 RX ADMIN — SODIUM CHLORIDE, POTASSIUM CHLORIDE, SODIUM LACTATE AND CALCIUM CHLORIDE: 600; 310; 30; 20 INJECTION, SOLUTION INTRAVENOUS at 16:36

## 2018-06-16 RX ADMIN — SODIUM CHLORIDE, POTASSIUM CHLORIDE, SODIUM LACTATE AND CALCIUM CHLORIDE: 600; 310; 30; 20 INJECTION, SOLUTION INTRAVENOUS at 05:10

## 2018-06-16 RX ADMIN — IPRATROPIUM BROMIDE AND ALBUTEROL SULFATE 3 ML: .5; 3 SOLUTION RESPIRATORY (INHALATION) at 18:47

## 2018-06-16 RX ADMIN — CEFAZOLIN SODIUM 2 G: 2 INJECTION, SOLUTION INTRAVENOUS at 20:57

## 2018-06-16 RX ADMIN — AMLODIPINE BESYLATE 5 MG: 10 TABLET ORAL at 09:39

## 2018-06-16 ASSESSMENT — PULMONARY FUNCTION TESTS: FVC: 1
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
## 2018-06-16 NOTE — CARE PLAN
Problem: Ventilation Defect:  Goal: Ability to achieve and maintain unassisted ventilation or tolerate decreased levels of ventilator support    Intervention: Support and monitor invasive and noninvasive mechanical ventilation  Adult Ventilation Update    Total Vent Days: 16    Patient Lines/Drains/Airways Status    Active Airway     Name: Placement date: Placement time: Site: Days:    Airway Group Portex Trach Tracheostomy 8.0 06/07/18   1055   Tracheostomy   10              Barriers to Wean: FiO2 >60% or PEEP >10 CM H2O (06/10/18 0656)    (ASV) % MIN VOL: 130 (06/16/18 0244)    Cough: Productive (06/16/18 0244)  Sputum Amount: Small;Moderate (06/16/18 0244)  Sputum Color: Brown;Tan;Yellow (06/16/18 0244)  Sputum Consistency: Thick;Thin (06/16/18 0244)    Mobility  Level of Mobility: Level I (06/15/18 2000)  Activity Performed: Edge of bed (06/15/18 2000)  Time Activity Tolerated: 5 min (06/15/18 2000)  Assistance / Tolerance: Assistance of Two or More;Tolerates Well (06/15/18 2000)  Pt Calls for Assistance: No (06/15/18 2000)  Staff Present for Mobilization: CNA;RN (06/15/18 2000)  Assistive Devices: Hand held assist (06/15/18 2000)

## 2018-06-16 NOTE — CARE PLAN
Problem: Safety  Goal: Will remain free from falls  Fall precautions in place.     Problem: Pain Management  Goal: Pain level will decrease to patient's comfort goal  Continuing to assess pain q2hr and PRN. Multiple interventions in place. See MAR for medication details.

## 2018-06-16 NOTE — PROGRESS NOTES
Bedside report received from Floridalma MELTON. All questions and concerns addressed, neuro exam and 2 RN skin check performed. All gtt's verified.

## 2018-06-16 NOTE — CARE PLAN
Problem: Safety  Goal: Will remain free from injury  Bed in low locked position, room near nurse station.     Problem: Skin Integrity  Goal: Risk for impaired skin integrity will decrease  Q 2 hour turning, pillows in use

## 2018-06-16 NOTE — CARE PLAN
Problem: Mobility  Goal: Risk for activity intolerance will decrease  Outcome: PROGRESSING AS EXPECTED  Patient tolerated sitting edge of bed for 10min.     Problem: Skin Integrity  Goal: Risk for impaired skin integrity will decrease  Outcome: PROGRESSING SLOWER THAN EXPECTED  Small purple wound located in rectum of patient, wound consult placed and wound photos taken with appropriate LDA's opened. q2 turns of patient, all lines and devices checked with repositioning for pressure and skin breakdown, mepilex on sacrum, draw sheet in place for repositioning, heel float boots in place, pillows used for turning and under elbows/knees for pressure redistribution.

## 2018-06-17 ENCOUNTER — APPOINTMENT (OUTPATIENT)
Dept: RADIOLOGY | Facility: MEDICAL CENTER | Age: 63
DRG: 003 | End: 2018-06-17
Attending: SURGERY
Payer: COMMERCIAL

## 2018-06-17 PROBLEM — G82.50 ACUTE FLACCID QUADRIPLEGIA (HCC): Status: ACTIVE | Noted: 2018-06-17

## 2018-06-17 PROBLEM — K56.7 ILEUS (HCC): Status: RESOLVED | Noted: 2018-06-04 | Resolved: 2018-06-17

## 2018-06-17 LAB
ALBUMIN SERPL BCP-MCNC: 2.4 G/DL (ref 3.2–4.9)
ALBUMIN/GLOB SERPL: 0.7 G/DL
ALP SERPL-CCNC: 78 U/L (ref 30–99)
ALT SERPL-CCNC: 33 U/L (ref 2–50)
AMMONIA PLAS-SCNC: 36 UMOL/L (ref 11–45)
ANION GAP SERPL CALC-SCNC: 9 MMOL/L (ref 0–11.9)
AST SERPL-CCNC: 31 U/L (ref 12–45)
BASOPHILS # BLD AUTO: 0.3 % (ref 0–1.8)
BASOPHILS # BLD: 0.05 K/UL (ref 0–0.12)
BILIRUB SERPL-MCNC: 0.8 MG/DL (ref 0.1–1.5)
BUN SERPL-MCNC: 40 MG/DL (ref 8–22)
CALCIUM SERPL-MCNC: 7.5 MG/DL (ref 8.5–10.5)
CHLORIDE SERPL-SCNC: 114 MMOL/L (ref 96–112)
CO2 SERPL-SCNC: 29 MMOL/L (ref 20–33)
CREAT SERPL-MCNC: 0.94 MG/DL (ref 0.5–1.4)
EOSINOPHIL # BLD AUTO: 0.33 K/UL (ref 0–0.51)
EOSINOPHIL NFR BLD: 1.8 % (ref 0–6.9)
ERYTHROCYTE [DISTWIDTH] IN BLOOD BY AUTOMATED COUNT: 51.7 FL (ref 35.9–50)
GLOBULIN SER CALC-MCNC: 3.4 G/DL (ref 1.9–3.5)
GLUCOSE SERPL-MCNC: 136 MG/DL (ref 65–99)
HCT VFR BLD AUTO: 26.3 % (ref 42–52)
HGB BLD-MCNC: 7.9 G/DL (ref 14–18)
IMM GRANULOCYTES # BLD AUTO: 0.17 K/UL (ref 0–0.11)
IMM GRANULOCYTES NFR BLD AUTO: 0.9 % (ref 0–0.9)
LYMPHOCYTES # BLD AUTO: 2.47 K/UL (ref 1–4.8)
LYMPHOCYTES NFR BLD: 13.2 % (ref 22–41)
MCH RBC QN AUTO: 29.8 PG (ref 27–33)
MCHC RBC AUTO-ENTMCNC: 30 G/DL (ref 33.7–35.3)
MCV RBC AUTO: 99.2 FL (ref 81.4–97.8)
MONOCYTES # BLD AUTO: 1.26 K/UL (ref 0–0.85)
MONOCYTES NFR BLD AUTO: 6.7 % (ref 0–13.4)
NEUTROPHILS # BLD AUTO: 14.49 K/UL (ref 1.82–7.42)
NEUTROPHILS NFR BLD: 77.1 % (ref 44–72)
NRBC # BLD AUTO: 0 K/UL
NRBC BLD-RTO: 0 /100 WBC
PLATELET # BLD AUTO: 428 K/UL (ref 164–446)
PMV BLD AUTO: 12 FL (ref 9–12.9)
POTASSIUM SERPL-SCNC: 3.4 MMOL/L (ref 3.6–5.5)
PROT SERPL-MCNC: 5.8 G/DL (ref 6–8.2)
RBC # BLD AUTO: 2.65 M/UL (ref 4.7–6.1)
SODIUM SERPL-SCNC: 152 MMOL/L (ref 135–145)
WBC # BLD AUTO: 18.8 K/UL (ref 4.8–10.8)

## 2018-06-17 PROCEDURE — A9270 NON-COVERED ITEM OR SERVICE: HCPCS | Performed by: SURGERY

## 2018-06-17 PROCEDURE — 700102 HCHG RX REV CODE 250 W/ 637 OVERRIDE(OP): Performed by: SURGERY

## 2018-06-17 PROCEDURE — 770022 HCHG ROOM/CARE - ICU (200)

## 2018-06-17 PROCEDURE — 82140 ASSAY OF AMMONIA: CPT

## 2018-06-17 PROCEDURE — 99292 CRITICAL CARE ADDL 30 MIN: CPT | Performed by: SURGERY

## 2018-06-17 PROCEDURE — 700101 HCHG RX REV CODE 250: Performed by: SURGERY

## 2018-06-17 PROCEDURE — 700112 HCHG RX REV CODE 229: Performed by: SURGERY

## 2018-06-17 PROCEDURE — 700111 HCHG RX REV CODE 636 W/ 250 OVERRIDE (IP): Performed by: SURGERY

## 2018-06-17 PROCEDURE — 80053 COMPREHEN METABOLIC PANEL: CPT

## 2018-06-17 PROCEDURE — 71045 X-RAY EXAM CHEST 1 VIEW: CPT

## 2018-06-17 PROCEDURE — 94640 AIRWAY INHALATION TREATMENT: CPT

## 2018-06-17 PROCEDURE — 94150 VITAL CAPACITY TEST: CPT

## 2018-06-17 PROCEDURE — 700105 HCHG RX REV CODE 258: Performed by: SURGERY

## 2018-06-17 PROCEDURE — 94003 VENT MGMT INPAT SUBQ DAY: CPT

## 2018-06-17 PROCEDURE — 85025 COMPLETE CBC W/AUTO DIFF WBC: CPT

## 2018-06-17 PROCEDURE — 99291 CRITICAL CARE FIRST HOUR: CPT | Performed by: SURGERY

## 2018-06-17 RX ORDER — POTASSIUM CHLORIDE 29.8 MG/ML
40 INJECTION INTRAVENOUS ONCE
Status: COMPLETED | OUTPATIENT
Start: 2018-06-17 | End: 2018-06-17

## 2018-06-17 RX ORDER — SPIRONOLACTONE 50 MG/1
50 TABLET, FILM COATED ORAL
Status: DISCONTINUED | OUTPATIENT
Start: 2018-06-17 | End: 2018-06-30

## 2018-06-17 RX ORDER — PROPRANOLOL HYDROCHLORIDE 10 MG/1
10 TABLET ORAL EVERY 8 HOURS
Status: DISCONTINUED | OUTPATIENT
Start: 2018-06-17 | End: 2018-07-04 | Stop reason: HOSPADM

## 2018-06-17 RX ORDER — FUROSEMIDE 20 MG/1
20 TABLET ORAL
Status: DISCONTINUED | OUTPATIENT
Start: 2018-06-17 | End: 2018-06-19

## 2018-06-17 RX ADMIN — SODIUM CHLORIDE, POTASSIUM CHLORIDE, SODIUM LACTATE AND CALCIUM CHLORIDE: 600; 310; 30; 20 INJECTION, SOLUTION INTRAVENOUS at 03:49

## 2018-06-17 RX ADMIN — FAMOTIDINE 20 MG: 20 TABLET ORAL at 21:46

## 2018-06-17 RX ADMIN — ENOXAPARIN SODIUM 30 MG: 100 INJECTION SUBCUTANEOUS at 09:01

## 2018-06-17 RX ADMIN — FUROSEMIDE 20 MG: 20 TABLET ORAL at 10:54

## 2018-06-17 RX ADMIN — SENNOSIDES AND DOCUSATE SODIUM 1 TABLET: 8.6; 5 TABLET ORAL at 21:45

## 2018-06-17 RX ADMIN — SPIRONOLACTONE 50 MG: 50 TABLET ORAL at 17:01

## 2018-06-17 RX ADMIN — CEFAZOLIN SODIUM 2 G: 2 INJECTION, SOLUTION INTRAVENOUS at 04:57

## 2018-06-17 RX ADMIN — SPIRONOLACTONE 50 MG: 50 TABLET ORAL at 10:54

## 2018-06-17 RX ADMIN — ENOXAPARIN SODIUM 30 MG: 100 INJECTION SUBCUTANEOUS at 21:45

## 2018-06-17 RX ADMIN — LABETALOL HYDROCHLORIDE 10 MG: 5 INJECTION INTRAVENOUS at 20:14

## 2018-06-17 RX ADMIN — OXYCODONE HYDROCHLORIDE 10 MG: 5 TABLET ORAL at 22:34

## 2018-06-17 RX ADMIN — LABETALOL HYDROCHLORIDE 10 MG: 5 INJECTION INTRAVENOUS at 17:02

## 2018-06-17 RX ADMIN — IPRATROPIUM BROMIDE AND ALBUTEROL SULFATE 3 ML: .5; 3 SOLUTION RESPIRATORY (INHALATION) at 11:30

## 2018-06-17 RX ADMIN — CEFAZOLIN SODIUM 2 G: 2 INJECTION, SOLUTION INTRAVENOUS at 21:59

## 2018-06-17 RX ADMIN — FAMOTIDINE 20 MG: 20 TABLET ORAL at 09:01

## 2018-06-17 RX ADMIN — IPRATROPIUM BROMIDE AND ALBUTEROL SULFATE 3 ML: .5; 3 SOLUTION RESPIRATORY (INHALATION) at 02:37

## 2018-06-17 RX ADMIN — LACTULOSE 30 ML: 20 SOLUTION ORAL at 16:10

## 2018-06-17 RX ADMIN — AMLODIPINE BESYLATE 5 MG: 10 TABLET ORAL at 09:01

## 2018-06-17 RX ADMIN — IPRATROPIUM BROMIDE AND ALBUTEROL SULFATE 3 ML: .5; 3 SOLUTION RESPIRATORY (INHALATION) at 07:01

## 2018-06-17 RX ADMIN — POLYETHYLENE GLYCOL 3350 1 PACKET: 17 POWDER, FOR SOLUTION ORAL at 21:46

## 2018-06-17 RX ADMIN — IPRATROPIUM BROMIDE AND ALBUTEROL SULFATE 3 ML: .5; 3 SOLUTION RESPIRATORY (INHALATION) at 23:05

## 2018-06-17 RX ADMIN — CEFAZOLIN SODIUM 2 G: 2 INJECTION, SOLUTION INTRAVENOUS at 13:38

## 2018-06-17 RX ADMIN — LACTULOSE 30 ML: 20 SOLUTION ORAL at 21:59

## 2018-06-17 RX ADMIN — FUROSEMIDE 20 MG: 20 TABLET ORAL at 17:01

## 2018-06-17 RX ADMIN — PROPRANOLOL HYDROCHLORIDE 10 MG: 10 TABLET ORAL at 13:38

## 2018-06-17 RX ADMIN — PROPRANOLOL HYDROCHLORIDE 10 MG: 10 TABLET ORAL at 21:46

## 2018-06-17 RX ADMIN — DOCUSATE SODIUM 100 MG: 50 LIQUID ORAL at 21:46

## 2018-06-17 RX ADMIN — IPRATROPIUM BROMIDE AND ALBUTEROL SULFATE 3 ML: .5; 3 SOLUTION RESPIRATORY (INHALATION) at 15:32

## 2018-06-17 RX ADMIN — RIFAXIMIN 550 MG: 550 TABLET ORAL at 21:45

## 2018-06-17 RX ADMIN — LACTULOSE 30 ML: 20 SOLUTION ORAL at 09:01

## 2018-06-17 RX ADMIN — RIFAXIMIN 550 MG: 550 TABLET ORAL at 09:01

## 2018-06-17 RX ADMIN — IPRATROPIUM BROMIDE AND ALBUTEROL SULFATE 3 ML: .5; 3 SOLUTION RESPIRATORY (INHALATION) at 18:14

## 2018-06-17 RX ADMIN — POTASSIUM CHLORIDE 40 MEQ: 400 INJECTION, SOLUTION INTRAVENOUS at 10:54

## 2018-06-17 RX ADMIN — OXYCODONE HYDROCHLORIDE 5 MG: 5 TABLET ORAL at 14:38

## 2018-06-17 ASSESSMENT — PULMONARY FUNCTION TESTS: FVC: 1.1

## 2018-06-17 NOTE — CARE PLAN
Problem: Ventilation Defect:  Goal: Ability to achieve and maintain unassisted ventilation or tolerate decreased levels of ventilator support    Intervention: Support and monitor invasive and noninvasive mechanical ventilation  Adult Ventilation Update    Total Vent Days: 17      Patient Lines/Drains/Airways Status    Active Airway     Name: Placement date: Placement time: Site: Days:    Airway Group Portex Trach Tracheostomy 8.0 06/07/18   1055   Tracheostomy   11              In the last 24 hours, the patient tolerated SBT for 2 hour and returned to rest settings per protocol (06/16/18 1705)    (ASV) % MIN VOL: 120 (06/17/18 0238)    Cough: Productive (06/17/18 0238)  Sputum Amount: Small;Moderate (06/17/18 0238)  Sputum Color: Brown;Tan;Yellow (06/17/18 0238)  Sputum Consistency: Thick;Thin (06/17/18 0238)    Mobility  Level of Mobility: Level II (06/16/18 2000)  Activity Performed: Edge of bed (06/16/18 2000)  Time Activity Tolerated: 10 min (06/16/18 2000)  Assistance / Tolerance: Assistance of Two or More;Tolerates Well (06/16/18 2000)  Pt Calls for Assistance: No (06/16/18 2000)  Staff Present for Mobilization: CNA;RN (06/16/18 2000)  Assistive Devices: Hand held assist (06/16/18 2000)
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
## 2018-06-17 NOTE — CARE PLAN
Problem: Safety  Goal: Will remain free from injury  Outcome: PROGRESSING AS EXPECTED  Bed in lowest and locked position, call light within reach, bed alarm is on.     Problem: Pain Management  Goal: Pain level will decrease to patient's comfort goal  Outcome: PROGRESSING AS EXPECTED  Q2 assessments for pain, appropriate PRN medications on MAR
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
## 2018-06-18 ENCOUNTER — APPOINTMENT (OUTPATIENT)
Dept: RADIOLOGY | Facility: MEDICAL CENTER | Age: 63
DRG: 003 | End: 2018-06-18
Attending: SURGERY
Payer: COMMERCIAL

## 2018-06-18 LAB
AMMONIA PLAS-SCNC: 37 UMOL/L (ref 11–45)
ANION GAP SERPL CALC-SCNC: 9 MMOL/L (ref 0–11.9)
BASOPHILS # BLD AUTO: 0.3 % (ref 0–1.8)
BASOPHILS # BLD: 0.07 K/UL (ref 0–0.12)
BUN SERPL-MCNC: 44 MG/DL (ref 8–22)
BURR CELLS/RBC NFR CSF MANUAL: <1 %
CALCIUM SERPL-MCNC: 7.9 MG/DL (ref 8.5–10.5)
CHLORIDE SERPL-SCNC: 113 MMOL/L (ref 96–112)
CLARITY CSF: CLEAR
CO2 SERPL-SCNC: 28 MMOL/L (ref 20–33)
COLOR CSF: COLORLESS
COLOR SPUN CSF: COLORLESS
CREAT SERPL-MCNC: 0.92 MG/DL (ref 0.5–1.4)
CRP SERPL HS-MCNC: 5.67 MG/DL (ref 0–0.75)
CSF COMMENTS 1658: NORMAL
EOSINOPHIL # BLD AUTO: 0.28 K/UL (ref 0–0.51)
EOSINOPHIL NFR BLD: 1.4 % (ref 0–6.9)
ERYTHROCYTE [DISTWIDTH] IN BLOOD BY AUTOMATED COUNT: 52.5 FL (ref 35.9–50)
GLUCOSE CSF-MCNC: 73 MG/DL (ref 40–80)
GLUCOSE SERPL-MCNC: 133 MG/DL (ref 65–99)
GRAM STN SPEC: NORMAL
HCT VFR BLD AUTO: 27.1 % (ref 42–52)
HGB BLD-MCNC: 8.3 G/DL (ref 14–18)
IMM GRANULOCYTES # BLD AUTO: 0.21 K/UL (ref 0–0.11)
IMM GRANULOCYTES NFR BLD AUTO: 1 % (ref 0–0.9)
INR PPP: 1.38 (ref 0.87–1.13)
LYMPHOCYTES # BLD AUTO: 2.8 K/UL (ref 1–4.8)
LYMPHOCYTES NFR BLD: 14 % (ref 22–41)
LYMPHOCYTES NFR CSF: 80 %
MAGNESIUM SERPL-MCNC: 2.3 MG/DL (ref 1.5–2.5)
MCH RBC QN AUTO: 30.4 PG (ref 27–33)
MCHC RBC AUTO-ENTMCNC: 30.6 G/DL (ref 33.7–35.3)
MCV RBC AUTO: 99.3 FL (ref 81.4–97.8)
MONOCYTES # BLD AUTO: 1.26 K/UL (ref 0–0.85)
MONOCYTES NFR BLD AUTO: 6.3 % (ref 0–13.4)
MONONUC CELLS NFR CSF: 20 %
NEUTROPHILS # BLD AUTO: 15.44 K/UL (ref 1.82–7.42)
NEUTROPHILS NFR BLD: 77 % (ref 44–72)
NRBC # BLD AUTO: 0 K/UL
NRBC BLD-RTO: 0 /100 WBC
PHOSPHATE SERPL-MCNC: 3.2 MG/DL (ref 2.5–4.5)
PLATELET # BLD AUTO: 439 K/UL (ref 164–446)
PMV BLD AUTO: 11.9 FL (ref 9–12.9)
POTASSIUM SERPL-SCNC: 3.3 MMOL/L (ref 3.6–5.5)
PREALB SERPL-MCNC: 18 MG/DL (ref 18–38)
PROT CSF-MCNC: 67 MG/DL (ref 15–45)
PROTHROMBIN TIME: 16.7 SEC (ref 12–14.6)
RBC # BLD AUTO: 2.73 M/UL (ref 4.7–6.1)
RBC # CSF: 62 CELLS/UL
SIGNIFICANT IND 70042: NORMAL
SITE SITE: NORMAL
SODIUM SERPL-SCNC: 150 MMOL/L (ref 135–145)
SOURCE SOURCE: NORMAL
SPECIMEN VOL CSF: 11.9 ML
TUBE # CSF: 3
TUBE # CSF: 4
WBC # BLD AUTO: 20.1 K/UL (ref 4.8–10.8)
WBC # CSF: 3 CELLS/UL (ref 0–10)

## 2018-06-18 PROCEDURE — 36556 INSERT NON-TUNNEL CV CATH: CPT | Mod: RT | Performed by: SURGERY

## 2018-06-18 PROCEDURE — 700102 HCHG RX REV CODE 250 W/ 637 OVERRIDE(OP): Performed by: SURGERY

## 2018-06-18 PROCEDURE — 82945 GLUCOSE OTHER FLUID: CPT

## 2018-06-18 PROCEDURE — A9270 NON-COVERED ITEM OR SERVICE: HCPCS | Performed by: SURGERY

## 2018-06-18 PROCEDURE — 97535 SELF CARE MNGMENT TRAINING: CPT

## 2018-06-18 PROCEDURE — 700112 HCHG RX REV CODE 229: Performed by: SURGERY

## 2018-06-18 PROCEDURE — 99291 CRITICAL CARE FIRST HOUR: CPT | Mod: 25 | Performed by: SURGERY

## 2018-06-18 PROCEDURE — 6A551Z3 PHERESIS OF PLASMA, MULTIPLE: ICD-10-PCS | Performed by: INTERNAL MEDICINE

## 2018-06-18 PROCEDURE — 99152 MOD SED SAME PHYS/QHP 5/>YRS: CPT

## 2018-06-18 PROCEDURE — 94640 AIRWAY INHALATION TREATMENT: CPT

## 2018-06-18 PROCEDURE — 71045 X-RAY EXAM CHEST 1 VIEW: CPT

## 2018-06-18 PROCEDURE — 97167 OT EVAL HIGH COMPLEX 60 MIN: CPT

## 2018-06-18 PROCEDURE — 02HV33Z INSERTION OF INFUSION DEVICE INTO SUPERIOR VENA CAVA, PERCUTANEOUS APPROACH: ICD-10-PCS | Performed by: SURGERY

## 2018-06-18 PROCEDURE — 84157 ASSAY OF PROTEIN OTHER: CPT

## 2018-06-18 PROCEDURE — 85610 PROTHROMBIN TIME: CPT

## 2018-06-18 PROCEDURE — 86140 C-REACTIVE PROTEIN: CPT

## 2018-06-18 PROCEDURE — B548ZZA ULTRASONOGRAPHY OF SUPERIOR VENA CAVA, GUIDANCE: ICD-10-PCS | Performed by: SURGERY

## 2018-06-18 PROCEDURE — G8987 SELF CARE CURRENT STATUS: HCPCS | Mod: CN

## 2018-06-18 PROCEDURE — 700111 HCHG RX REV CODE 636 W/ 250 OVERRIDE (IP): Performed by: INTERNAL MEDICINE

## 2018-06-18 PROCEDURE — 84134 ASSAY OF PREALBUMIN: CPT

## 2018-06-18 PROCEDURE — 700111 HCHG RX REV CODE 636 W/ 250 OVERRIDE (IP): Performed by: SURGERY

## 2018-06-18 PROCEDURE — G8988 SELF CARE GOAL STATUS: HCPCS | Mod: CL

## 2018-06-18 PROCEDURE — 84100 ASSAY OF PHOSPHORUS: CPT

## 2018-06-18 PROCEDURE — 87205 SMEAR GRAM STAIN: CPT

## 2018-06-18 PROCEDURE — 009U3ZX DRAINAGE OF SPINAL CANAL, PERCUTANEOUS APPROACH, DIAGNOSTIC: ICD-10-PCS | Performed by: INTERNAL MEDICINE

## 2018-06-18 PROCEDURE — 89051 BODY FLUID CELL COUNT: CPT

## 2018-06-18 PROCEDURE — 36556 INSERT NON-TUNNEL CV CATH: CPT

## 2018-06-18 PROCEDURE — G8979 MOBILITY GOAL STATUS: HCPCS | Mod: CK

## 2018-06-18 PROCEDURE — 97163 PT EVAL HIGH COMPLEX 45 MIN: CPT

## 2018-06-18 PROCEDURE — C1751 CATH, INF, PER/CENT/MIDLINE: HCPCS

## 2018-06-18 PROCEDURE — 94003 VENT MGMT INPAT SUBQ DAY: CPT

## 2018-06-18 PROCEDURE — 62270 DX LMBR SPI PNXR: CPT | Performed by: INTERNAL MEDICINE

## 2018-06-18 PROCEDURE — 87070 CULTURE OTHR SPECIMN AEROBIC: CPT

## 2018-06-18 PROCEDURE — G8978 MOBILITY CURRENT STATUS: HCPCS | Mod: CN

## 2018-06-18 PROCEDURE — 85025 COMPLETE CBC W/AUTO DIFF WBC: CPT

## 2018-06-18 PROCEDURE — 82140 ASSAY OF AMMONIA: CPT

## 2018-06-18 PROCEDURE — 770022 HCHG ROOM/CARE - ICU (200)

## 2018-06-18 PROCEDURE — 700101 HCHG RX REV CODE 250: Performed by: SURGERY

## 2018-06-18 PROCEDURE — 80048 BASIC METABOLIC PNL TOTAL CA: CPT

## 2018-06-18 PROCEDURE — 95886 MUSC TEST DONE W/N TEST COMP: CPT

## 2018-06-18 PROCEDURE — 83735 ASSAY OF MAGNESIUM: CPT

## 2018-06-18 PROCEDURE — 62270 DX LMBR SPI PNXR: CPT

## 2018-06-18 PROCEDURE — 95910 NRV CNDJ TEST 7-8 STUDIES: CPT

## 2018-06-18 PROCEDURE — 99153 MOD SED SAME PHYS/QHP EA: CPT

## 2018-06-18 PROCEDURE — P9045 ALBUMIN (HUMAN), 5%, 250 ML: HCPCS | Performed by: INTERNAL MEDICINE

## 2018-06-18 PROCEDURE — 36514 APHERESIS PLASMA: CPT

## 2018-06-18 RX ORDER — HEPARIN SODIUM 1000 [USP'U]/ML
3300 INJECTION, SOLUTION INTRAVENOUS; SUBCUTANEOUS ONCE
Status: DISCONTINUED | OUTPATIENT
Start: 2018-06-18 | End: 2018-06-18

## 2018-06-18 RX ORDER — MIDAZOLAM HYDROCHLORIDE 1 MG/ML
INJECTION INTRAMUSCULAR; INTRAVENOUS
Status: ACTIVE
Start: 2018-06-18 | End: 2018-06-19

## 2018-06-18 RX ORDER — HEPARIN SODIUM 1000 [USP'U]/ML
3000 INJECTION, SOLUTION INTRAVENOUS; SUBCUTANEOUS PRN
Status: DISCONTINUED | OUTPATIENT
Start: 2018-06-18 | End: 2018-07-01

## 2018-06-18 RX ORDER — AMLODIPINE BESYLATE 10 MG/1
5 TABLET ORAL ONCE
Status: DISPENSED | OUTPATIENT
Start: 2018-06-18 | End: 2018-06-19

## 2018-06-18 RX ORDER — MIDAZOLAM HYDROCHLORIDE 1 MG/ML
1-5 INJECTION INTRAMUSCULAR; INTRAVENOUS ONCE
Status: COMPLETED | OUTPATIENT
Start: 2018-06-18 | End: 2018-06-18

## 2018-06-18 RX ORDER — HEPARIN SODIUM 1000 [USP'U]/ML
3000 INJECTION, SOLUTION INTRAVENOUS; SUBCUTANEOUS ONCE
Status: DISCONTINUED | OUTPATIENT
Start: 2018-06-18 | End: 2018-06-18

## 2018-06-18 RX ORDER — CALCIUM CHLORIDE 100 MG/ML
2331 INJECTION INTRAVENOUS; INTRAVENTRICULAR
Status: DISCONTINUED | OUTPATIENT
Start: 2018-06-18 | End: 2018-06-19

## 2018-06-18 RX ORDER — MIDAZOLAM HYDROCHLORIDE 1 MG/ML
5 INJECTION INTRAMUSCULAR; INTRAVENOUS ONCE
Status: COMPLETED | OUTPATIENT
Start: 2018-06-18 | End: 2018-06-18

## 2018-06-18 RX ORDER — ALBUMIN, HUMAN INJ 5% 5 %
6000 SOLUTION INTRAVENOUS
Status: DISCONTINUED | OUTPATIENT
Start: 2018-06-18 | End: 2018-06-19

## 2018-06-18 RX ADMIN — POTASSIUM BICARBONATE 25 MEQ: 25 TABLET, EFFERVESCENT ORAL at 20:57

## 2018-06-18 RX ADMIN — MIDAZOLAM HYDROCHLORIDE 5 MG: 1 INJECTION, SOLUTION INTRAMUSCULAR; INTRAVENOUS at 13:09

## 2018-06-18 RX ADMIN — FUROSEMIDE 20 MG: 20 TABLET ORAL at 17:48

## 2018-06-18 RX ADMIN — LACTULOSE 30 ML: 20 SOLUTION ORAL at 14:09

## 2018-06-18 RX ADMIN — CEFAZOLIN SODIUM 2 G: 2 INJECTION, SOLUTION INTRAVENOUS at 20:57

## 2018-06-18 RX ADMIN — FUROSEMIDE 20 MG: 20 TABLET ORAL at 05:12

## 2018-06-18 RX ADMIN — RIFAXIMIN 550 MG: 550 TABLET ORAL at 20:57

## 2018-06-18 RX ADMIN — MIDAZOLAM HYDROCHLORIDE 5 MG: 1 INJECTION, SOLUTION INTRAMUSCULAR; INTRAVENOUS at 12:14

## 2018-06-18 RX ADMIN — LACTULOSE 30 ML: 20 SOLUTION ORAL at 09:42

## 2018-06-18 RX ADMIN — CEFAZOLIN SODIUM 2 G: 2 INJECTION, SOLUTION INTRAVENOUS at 14:09

## 2018-06-18 RX ADMIN — FAMOTIDINE 20 MG: 20 TABLET ORAL at 09:42

## 2018-06-18 RX ADMIN — POTASSIUM BICARBONATE 50 MEQ: 25 TABLET, EFFERVESCENT ORAL at 10:50

## 2018-06-18 RX ADMIN — ALBUMIN (HUMAN) 300 G: 12.5 INJECTION, SOLUTION INTRAVENOUS at 16:47

## 2018-06-18 RX ADMIN — MAGNESIUM HYDROXIDE 30 ML: 400 SUSPENSION ORAL at 09:42

## 2018-06-18 RX ADMIN — IPRATROPIUM BROMIDE AND ALBUTEROL SULFATE 3 ML: .5; 3 SOLUTION RESPIRATORY (INHALATION) at 18:47

## 2018-06-18 RX ADMIN — FAMOTIDINE 20 MG: 20 TABLET ORAL at 20:57

## 2018-06-18 RX ADMIN — IPRATROPIUM BROMIDE AND ALBUTEROL SULFATE 3 ML: .5; 3 SOLUTION RESPIRATORY (INHALATION) at 07:22

## 2018-06-18 RX ADMIN — CALCIUM CHLORIDE 2.33 G: 100 INJECTION, SOLUTION INTRAVENOUS at 16:50

## 2018-06-18 RX ADMIN — CEFAZOLIN SODIUM 2 G: 2 INJECTION, SOLUTION INTRAVENOUS at 05:12

## 2018-06-18 RX ADMIN — PROPRANOLOL HYDROCHLORIDE 10 MG: 10 TABLET ORAL at 20:57

## 2018-06-18 RX ADMIN — HEPARIN SODIUM 3000 UNITS: 1000 INJECTION, SOLUTION INTRAVENOUS; SUBCUTANEOUS at 17:34

## 2018-06-18 RX ADMIN — MIDAZOLAM HYDROCHLORIDE 5 MG: 1 INJECTION, SOLUTION INTRAMUSCULAR; INTRAVENOUS at 13:21

## 2018-06-18 RX ADMIN — DOCUSATE SODIUM 100 MG: 50 LIQUID ORAL at 09:42

## 2018-06-18 RX ADMIN — OXYCODONE HYDROCHLORIDE 10 MG: 5 TABLET ORAL at 10:50

## 2018-06-18 RX ADMIN — SPIRONOLACTONE 50 MG: 50 TABLET ORAL at 17:48

## 2018-06-18 RX ADMIN — IPRATROPIUM BROMIDE AND ALBUTEROL SULFATE 3 ML: .5; 3 SOLUTION RESPIRATORY (INHALATION) at 13:57

## 2018-06-18 RX ADMIN — PROPRANOLOL HYDROCHLORIDE 10 MG: 10 TABLET ORAL at 05:12

## 2018-06-18 RX ADMIN — IPRATROPIUM BROMIDE AND ALBUTEROL SULFATE 3 ML: .5; 3 SOLUTION RESPIRATORY (INHALATION) at 21:56

## 2018-06-18 RX ADMIN — IPRATROPIUM BROMIDE AND ALBUTEROL SULFATE 3 ML: .5; 3 SOLUTION RESPIRATORY (INHALATION) at 02:55

## 2018-06-18 RX ADMIN — ENOXAPARIN SODIUM 30 MG: 100 INJECTION SUBCUTANEOUS at 20:57

## 2018-06-18 RX ADMIN — RIFAXIMIN 550 MG: 550 TABLET ORAL at 09:42

## 2018-06-18 RX ADMIN — SPIRONOLACTONE 50 MG: 50 TABLET ORAL at 05:12

## 2018-06-18 RX ADMIN — AMLODIPINE BESYLATE 5 MG: 10 TABLET ORAL at 09:53

## 2018-06-18 RX ADMIN — POLYETHYLENE GLYCOL 3350 1 PACKET: 17 POWDER, FOR SOLUTION ORAL at 09:42

## 2018-06-18 RX ADMIN — IPRATROPIUM BROMIDE AND ALBUTEROL SULFATE 3 ML: .5; 3 SOLUTION RESPIRATORY (INHALATION) at 09:31

## 2018-06-18 ASSESSMENT — COGNITIVE AND FUNCTIONAL STATUS - GENERAL
MOBILITY SCORE: 6
CLIMB 3 TO 5 STEPS WITH RAILING: TOTAL
EATING MEALS: TOTAL
TURNING FROM BACK TO SIDE WHILE IN FLAT BAD: UNABLE
SUGGESTED CMS G CODE MODIFIER MOBILITY: CN
DRESSING REGULAR UPPER BODY CLOTHING: TOTAL
STANDING UP FROM CHAIR USING ARMS: TOTAL
HELP NEEDED FOR BATHING: TOTAL
WALKING IN HOSPITAL ROOM: TOTAL
MOVING TO AND FROM BED TO CHAIR: UNABLE
SUGGESTED CMS G CODE MODIFIER DAILY ACTIVITY: CN
DAILY ACTIVITIY SCORE: 6
TOILETING: TOTAL
MOVING FROM LYING ON BACK TO SITTING ON SIDE OF FLAT BED: UNABLE
PERSONAL GROOMING: TOTAL
DRESSING REGULAR LOWER BODY CLOTHING: TOTAL

## 2018-06-18 ASSESSMENT — GAIT ASSESSMENTS: GAIT LEVEL OF ASSIST: UNABLE TO PARTICIPATE

## 2018-06-18 ASSESSMENT — ACTIVITIES OF DAILY LIVING (ADL): TOILETING: INDEPENDENT
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
## 2018-06-18 NOTE — PROCEDURES
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 The patient was placed in the trendelenburg position. The patient's right anterior neck was prepped and draped in the standard sterile fashion.  The right internal jugular vein was accessed with a needle using ultrasound guidance. Once the wire was inserted, placement into the IJ vein was confirmed with the ultrasound. The modified seldinger technique was used to place a dual lumen acute hemodialysis catheter. The catheter was secured to the skin with suture.   Sterile dressings were applied, including a biopatch. The patient tolerated the procedure well and heparin dwell was then placed by nursing staff.  A chest x-ray was ordered for verification.       Chris Puente MD  [facility]  [phone]
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
## 2018-06-18 NOTE — CARE PLAN
Problem: Ventilation Defect:  Goal: Ability to achieve and maintain unassisted ventilation or tolerate decreased levels of ventilator support  Outcome: PROGRESSING AS EXPECTED  %/ Peep +10/ FiO2 40%    Problem: Oxygenation:  Goal: Maintain adequate oxygenation dependent on patient condition  Outcome: NOT MET  Place pt on initial T-piece 4 hour trial.  Keep saturation about 92%    Problem: Bronchoconstriction:  Goal: Improve in air movement and diminished wheezing  Outcome: PROGRESSING AS EXPECTED  Duoneb Q4

## 2018-06-18 NOTE — CARE PLAN
Problem: Ventilation Defect:  Goal: Ability to achieve and maintain unassisted ventilation or tolerate decreased levels of ventilator support    Intervention: Support and monitor invasive and noninvasive mechanical ventilation  Adult Ventilation Update    Total Vent Days: 18    Patient Lines/Drains/Airways Status    Active Airway     Name: Placement date: Placement time: Site: Days:    Airway Group Portex Trach Tracheostomy 8.0 06/07/18   1055   Tracheostomy   12              In the last 24 hours, the patient tolerated SBT for 2 hours.    (ASV) % MIN VOL: 120 (06/18/18 0256)    Cough: Productive (06/18/18 0400)  Sputum Amount: Moderate (06/18/18 0400)  Sputum Color: Brown;Tan;Yellow (06/18/18 0400)  Sputum Consistency: Thick;Thin (06/18/18 0400)    Mobility  Level of Mobility: Level II (06/18/18 0255)  Activity Performed: Sitting up in bed (06/18/18 0255)  Time Activity Tolerated: 15 min (06/18/18 0255)  Assistance / Tolerance: Assistance of One (06/18/18 0255)  Staff Present for Mobilization: RN (06/18/18 0255)
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
## 2018-06-18 NOTE — CARE PLAN
Problem: Venous Thromboembolism (VTW)/Deep Vein Thrombosis (DVT) Prevention:  Goal: Patient will participate in Venous Thrombosis (VTE)/Deep Vein Thrombosis (DVT)Prevention Measures  SCDs in place; LMWH administered per MAR.     Problem: Mobility  Goal: Risk for activity intolerance will decrease  Patient mobilized to sit at edge of bed for 10 minutes; patient unable to participate in mobilization, tolerated well.
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
## 2018-06-18 NOTE — CARE PLAN
Problem: Venous Thromboembolism (VTW)/Deep Vein Thrombosis (DVT) Prevention:  Goal: Patient will participate in Venous Thrombosis (VTE)/Deep Vein Thrombosis (DVT)Prevention Measures  Outcome: PROGRESSING AS EXPECTED  SCD's on and inflating for VTE, anticoagulation medication given per mar

## 2018-06-18 NOTE — CARE PLAN
Problem: Safety  Goal: Will remain free from falls  Outcome: PROGRESSING AS EXPECTED  Bed in lowest position, bed alarm on, non-skid socks on, side rails up X2, call light within reach.
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
## 2018-06-18 NOTE — PROCEDURES
Procedure Note    Date: 6/18/2018  Time: 1315    Procedure: Lumbar puncture  Level: L3-4    Indication: LE weakness  Consent: Informed consent obtained from patient or designated decision maker after explaining the benefits/risks of the procedure including but not limited to bleeding, infection, nerve or other deep structure injury, paralysis, or death. Patient or surrogate expressed understanding and agreement and signed consent which can be found in the patient's chart.    Procedure: After obtaining consent, a time-out was performed. Appropriate site confirmed with landmarks and patient positioned sitting up after attempted lying on side unsusccessful, prepped, and draped in sterile fashion. All those present wearing cap and mask and those physically participating remained sterile with cap, mask, and gloves. 5 mL of local anesthetic injected (1% lidocaine without epinephrine) achieving appropriate comfort level for patient. Using a 20 gauge spinal needle, CSF was obtained. After obtaining an appropriate volume of CSF, needle removed with stylet in place and site bandaged. Patient instructed to lay flat for 30-60 minutes as tolerated. Patient tolerated procedure well without any difficulties and left in care of bedside nurse.     CSF appearance: clear  CSF amount collected: 10cc  Opening pressure: unable to obtain given upright positioning of patient     EBL: minimal  Complications: none    Sample will be sent to the lab for the appropriate studies.    Jeremy Gonda, MD  Critical Care Medicine

## 2018-06-19 ENCOUNTER — APPOINTMENT (OUTPATIENT)
Dept: RADIOLOGY | Facility: MEDICAL CENTER | Age: 63
DRG: 003 | End: 2018-06-19
Attending: SURGERY
Payer: COMMERCIAL

## 2018-06-19 PROBLEM — G62.89: Status: ACTIVE | Noted: 2018-06-17

## 2018-06-19 LAB
AMMONIA PLAS-SCNC: 30 UMOL/L (ref 11–45)
ANION GAP SERPL CALC-SCNC: 8 MMOL/L (ref 0–11.9)
APTT PPP: 34.3 SEC (ref 24.7–36)
BASOPHILS # BLD AUTO: 0.3 % (ref 0–1.8)
BASOPHILS # BLD: 0.05 K/UL (ref 0–0.12)
BUN SERPL-MCNC: 37 MG/DL (ref 8–22)
CALCIUM SERPL-MCNC: 8.2 MG/DL (ref 8.5–10.5)
CHLORIDE SERPL-SCNC: 110 MMOL/L (ref 96–112)
CO2 SERPL-SCNC: 28 MMOL/L (ref 20–33)
CREAT SERPL-MCNC: 0.89 MG/DL (ref 0.5–1.4)
EOSINOPHIL # BLD AUTO: 0.3 K/UL (ref 0–0.51)
EOSINOPHIL NFR BLD: 1.6 % (ref 0–6.9)
ERYTHROCYTE [DISTWIDTH] IN BLOOD BY AUTOMATED COUNT: 50.3 FL (ref 35.9–50)
FIBRINOGEN PPP-MCNC: 188 MG/DL (ref 215–460)
GLUCOSE SERPL-MCNC: 131 MG/DL (ref 65–99)
HAV IGM SERPL QL IA: NEGATIVE
HBV CORE IGM SER QL: NEGATIVE
HBV SURFACE AB SERPL IA-ACNC: 23.96 MIU/ML (ref 0–10)
HBV SURFACE AG SER QL: NEGATIVE
HCT VFR BLD AUTO: 26.8 % (ref 42–52)
HCV AB SER QL: NEGATIVE
HGB BLD-MCNC: 8.5 G/DL (ref 14–18)
IMM GRANULOCYTES # BLD AUTO: 0.17 K/UL (ref 0–0.11)
IMM GRANULOCYTES NFR BLD AUTO: 0.9 % (ref 0–0.9)
INR PPP: 1.54 (ref 0.87–1.13)
LYMPHOCYTES # BLD AUTO: 2.87 K/UL (ref 1–4.8)
LYMPHOCYTES NFR BLD: 14.8 % (ref 22–41)
MCH RBC QN AUTO: 30.6 PG (ref 27–33)
MCHC RBC AUTO-ENTMCNC: 31.7 G/DL (ref 33.7–35.3)
MCV RBC AUTO: 96.4 FL (ref 81.4–97.8)
MONOCYTES # BLD AUTO: 1.15 K/UL (ref 0–0.85)
MONOCYTES NFR BLD AUTO: 5.9 % (ref 0–13.4)
NEUTROPHILS # BLD AUTO: 14.79 K/UL (ref 1.82–7.42)
NEUTROPHILS NFR BLD: 76.5 % (ref 44–72)
NRBC # BLD AUTO: 0 K/UL
NRBC BLD-RTO: 0 /100 WBC
PLATELET # BLD AUTO: 476 K/UL (ref 164–446)
PMV BLD AUTO: 12.1 FL (ref 9–12.9)
POTASSIUM SERPL-SCNC: 3.5 MMOL/L (ref 3.6–5.5)
PROTHROMBIN TIME: 18.2 SEC (ref 12–14.6)
RBC # BLD AUTO: 2.78 M/UL (ref 4.7–6.1)
SODIUM SERPL-SCNC: 146 MMOL/L (ref 135–145)
WBC # BLD AUTO: 19.3 K/UL (ref 4.8–10.8)

## 2018-06-19 PROCEDURE — 700102 HCHG RX REV CODE 250 W/ 637 OVERRIDE(OP): Performed by: SURGERY

## 2018-06-19 PROCEDURE — 770022 HCHG ROOM/CARE - ICU (200)

## 2018-06-19 PROCEDURE — 700111 HCHG RX REV CODE 636 W/ 250 OVERRIDE (IP): Performed by: SURGERY

## 2018-06-19 PROCEDURE — 80074 ACUTE HEPATITIS PANEL: CPT

## 2018-06-19 PROCEDURE — 80048 BASIC METABOLIC PNL TOTAL CA: CPT

## 2018-06-19 PROCEDURE — 86706 HEP B SURFACE ANTIBODY: CPT

## 2018-06-19 PROCEDURE — 82140 ASSAY OF AMMONIA: CPT

## 2018-06-19 PROCEDURE — 85730 THROMBOPLASTIN TIME PARTIAL: CPT

## 2018-06-19 PROCEDURE — 36514 APHERESIS PLASMA: CPT

## 2018-06-19 PROCEDURE — A9270 NON-COVERED ITEM OR SERVICE: HCPCS | Performed by: SURGERY

## 2018-06-19 PROCEDURE — 85025 COMPLETE CBC W/AUTO DIFF WBC: CPT

## 2018-06-19 PROCEDURE — 85610 PROTHROMBIN TIME: CPT

## 2018-06-19 PROCEDURE — 700111 HCHG RX REV CODE 636 W/ 250 OVERRIDE (IP): Performed by: INTERNAL MEDICINE

## 2018-06-19 PROCEDURE — 700101 HCHG RX REV CODE 250: Performed by: SURGERY

## 2018-06-19 PROCEDURE — 99291 CRITICAL CARE FIRST HOUR: CPT | Performed by: SURGERY

## 2018-06-19 PROCEDURE — 700112 HCHG RX REV CODE 229: Performed by: SURGERY

## 2018-06-19 PROCEDURE — 85384 FIBRINOGEN ACTIVITY: CPT

## 2018-06-19 PROCEDURE — 94640 AIRWAY INHALATION TREATMENT: CPT

## 2018-06-19 PROCEDURE — P9045 ALBUMIN (HUMAN), 5%, 250 ML: HCPCS | Performed by: INTERNAL MEDICINE

## 2018-06-19 PROCEDURE — 94003 VENT MGMT INPAT SUBQ DAY: CPT

## 2018-06-19 PROCEDURE — 71045 X-RAY EXAM CHEST 1 VIEW: CPT

## 2018-06-19 RX ORDER — FUROSEMIDE 10 MG/ML
40 INJECTION INTRAMUSCULAR; INTRAVENOUS
Status: DISCONTINUED | OUTPATIENT
Start: 2018-06-19 | End: 2018-06-30

## 2018-06-19 RX ORDER — ALBUMIN, HUMAN INJ 5% 5 %
5000 SOLUTION INTRAVENOUS
Status: COMPLETED | OUTPATIENT
Start: 2018-06-19 | End: 2018-06-19

## 2018-06-19 RX ORDER — CALCIUM CHLORIDE 100 MG/ML
2331 INJECTION INTRAVENOUS; INTRAVENTRICULAR
Status: COMPLETED | OUTPATIENT
Start: 2018-06-19 | End: 2018-06-19

## 2018-06-19 RX ADMIN — DOCUSATE SODIUM 100 MG: 50 LIQUID ORAL at 10:09

## 2018-06-19 RX ADMIN — FAMOTIDINE 20 MG: 20 TABLET ORAL at 21:23

## 2018-06-19 RX ADMIN — MAGNESIUM HYDROXIDE 30 ML: 400 SUSPENSION ORAL at 10:08

## 2018-06-19 RX ADMIN — PROPRANOLOL HYDROCHLORIDE 10 MG: 10 TABLET ORAL at 05:20

## 2018-06-19 RX ADMIN — POTASSIUM BICARBONATE 50 MEQ: 25 TABLET, EFFERVESCENT ORAL at 10:08

## 2018-06-19 RX ADMIN — FUROSEMIDE 40 MG: 10 INJECTION, SOLUTION INTRAMUSCULAR; INTRAVENOUS at 13:48

## 2018-06-19 RX ADMIN — CEFAZOLIN SODIUM 2 G: 2 INJECTION, SOLUTION INTRAVENOUS at 05:20

## 2018-06-19 RX ADMIN — OXYCODONE HYDROCHLORIDE 5 MG: 5 TABLET ORAL at 13:48

## 2018-06-19 RX ADMIN — ENOXAPARIN SODIUM 30 MG: 100 INJECTION SUBCUTANEOUS at 21:24

## 2018-06-19 RX ADMIN — POLYETHYLENE GLYCOL 3350 1 PACKET: 17 POWDER, FOR SOLUTION ORAL at 10:08

## 2018-06-19 RX ADMIN — FUROSEMIDE 20 MG: 20 TABLET ORAL at 05:20

## 2018-06-19 RX ADMIN — IPRATROPIUM BROMIDE AND ALBUTEROL SULFATE 3 ML: .5; 3 SOLUTION RESPIRATORY (INHALATION) at 13:55

## 2018-06-19 RX ADMIN — IPRATROPIUM BROMIDE AND ALBUTEROL SULFATE 3 ML: .5; 3 SOLUTION RESPIRATORY (INHALATION) at 10:58

## 2018-06-19 RX ADMIN — SPIRONOLACTONE 50 MG: 50 TABLET ORAL at 16:30

## 2018-06-19 RX ADMIN — CALCIUM CHLORIDE 2.33 G: 100 INJECTION, SOLUTION INTRAVENOUS at 10:30

## 2018-06-19 RX ADMIN — RIFAXIMIN 550 MG: 550 TABLET ORAL at 10:09

## 2018-06-19 RX ADMIN — SPIRONOLACTONE 50 MG: 50 TABLET ORAL at 05:20

## 2018-06-19 RX ADMIN — PROPRANOLOL HYDROCHLORIDE 10 MG: 10 TABLET ORAL at 21:22

## 2018-06-19 RX ADMIN — AMLODIPINE BESYLATE 5 MG: 10 TABLET ORAL at 10:09

## 2018-06-19 RX ADMIN — IPRATROPIUM BROMIDE AND ALBUTEROL SULFATE 3 ML: .5; 3 SOLUTION RESPIRATORY (INHALATION) at 19:13

## 2018-06-19 RX ADMIN — IPRATROPIUM BROMIDE AND ALBUTEROL SULFATE 3 ML: .5; 3 SOLUTION RESPIRATORY (INHALATION) at 07:11

## 2018-06-19 RX ADMIN — RIFAXIMIN 550 MG: 550 TABLET ORAL at 21:24

## 2018-06-19 RX ADMIN — FAMOTIDINE 20 MG: 20 TABLET ORAL at 10:09

## 2018-06-19 RX ADMIN — ALBUMIN (HUMAN) 250 G: 2.5 SOLUTION INTRAVENOUS at 10:30

## 2018-06-19 RX ADMIN — IPRATROPIUM BROMIDE AND ALBUTEROL SULFATE 3 ML: .5; 3 SOLUTION RESPIRATORY (INHALATION) at 02:50

## 2018-06-19 RX ADMIN — POTASSIUM BICARBONATE 25 MEQ: 25 TABLET, EFFERVESCENT ORAL at 10:09

## 2018-06-19 RX ADMIN — IPRATROPIUM BROMIDE AND ALBUTEROL SULFATE 3 ML: .5; 3 SOLUTION RESPIRATORY (INHALATION) at 23:13

## 2018-06-19 RX ADMIN — POTASSIUM BICARBONATE 25 MEQ: 25 TABLET, EFFERVESCENT ORAL at 21:22

## 2018-06-19 RX ADMIN — ENOXAPARIN SODIUM 30 MG: 100 INJECTION SUBCUTANEOUS at 10:09

## 2018-06-19 RX ADMIN — PROPRANOLOL HYDROCHLORIDE 10 MG: 10 TABLET ORAL at 13:48
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
## 2018-06-19 NOTE — DISCHARGE PLANNING
Received TC from [facility] Sabrina. Sabrina requested contact from the pt's bedside RN for an update.     TC to bedside RN, Jillian requesting she contact Sabrina @ [phone] when she gets a chance.

## 2018-06-19 NOTE — CARE PLAN
Problem: Ventilation Defect:  Goal: Ability to achieve and maintain unassisted ventilation or tolerate decreased levels of ventilator support  Adult Ventilation Update    Total Vent Days: 19    Patient Lines/Drains/Airways Status    Active Airway     Name: Placement date: Placement time: Site: Days:    Airway Group Portex Trach Tracheostomy 8.0 06/07/18   1055   Tracheostomy   11              Patient is on trach day 9 and is tolerating ASV settings. Patient is planning to be put on t-piece tomorrow. Patient is receiving duo Q4.

## 2018-06-19 NOTE — CARE PLAN
Problem: Pain Management  Goal: Pain level will decrease to patient's comfort goal    Intervention: Follow pain managment plan developed in collaboration with patient and Interdisciplinary Team  Pain assessed and patient medicated per the MAR      Problem: Skin Integrity  Goal: Risk for impaired skin integrity will decrease    Intervention: Implement precautions to protect skin integrity in collaboration with the interdisciplinary team  Patient turned and repositioned every 2 hours with the use of pillows and the draw sheet. Extremities floated on pillows and full skin assessment completed ensuring no devices under skin. Pedraza boots in use bilaterally. Mepilex on sacrum.
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
## 2018-06-19 NOTE — PROGRESS NOTES
TPE treatment #2 ordered by Dr. Lafleur. Initiated treatment at 1029 and ended at 1200 total of 85minutes treatment. Patient tolerated treatment with no s/s of adverse reaction. Used albumin 5% as replacement fluid. Processed 1 plasma volume total of 5L albumin. Stable post treatment. Locked CVC with ACD. Dressing to RIJ intact.

## 2018-06-19 NOTE — PROGRESS NOTES
Nephrologist ordered Plasmapheresis tx # 1. Initiated at 1523 and completed at 1710 .  Tolerated the procedure well.See flow sheet for details.  Report given to Mary MELTON.

## 2018-06-19 NOTE — CARE PLAN
Problem: Skin Integrity  Goal: Risk for impaired skin integrity will decrease  Outcome: PROGRESSING AS EXPECTED    Intervention: Implement precautions to protect skin integrity in collaboration with the interdisciplinary team   06/16/18 0800 06/18/18 0800 06/18/18 1800   OTHER   Skin Preventative Measures --  Pillows in Use for Support / Positioning;Heel Float Boots in Use  --    Bed Types --  Low Air Loss Mattress --    Friction Interventions --  Draw Sheet / Pad Used for Repositioning --    Protocols --  Pressure Ulcer Prevention / Intervention Protocol in Place --    Moisturizers --  Barrier Wipes --    Containment Devices Nasal Trumpet as Rectal Tube --  --    PT / OT Involved in Care --  Occupational Therapy Involved --    Activity  --  Edge of bed --    Patient Turns / Repositioning --  --  Supine   Assistance / Tolerance for Turning/Repositioning --  --  Assistance of Two or More;Tolerates Well   Patient is Receiving Nutrition --  Tube Feeding Nutrition --    Nutrition Consult Ordered --  No, Consult has Already been Placed --    Vitamin Therapy in Use --  No --      Patient turned Q2H. Pillows used for support and repositioning. Draw sheet used to decrease friction. Mepilex in place on sacrum.

## 2018-06-19 NOTE — PROGRESS NOTES
Dr. Gonda at bedside for lumbar puncture. Medications given per MAR. Consent signed and placed in chart.

## 2018-06-19 NOTE — WOUND TEAM
[facility]  Inpatient Services  Initial Wound & Skin Care Evaluation    Admission Date:  5/30/2018   HPI, PMH, SH: Reviewed  Unit where seen by Wound Team: [id number]    WOUND CONSULT RELATED TO: pressure injury mgmt    SUBJECTIVE:   trach    Self Report / Pain Level:no c/o pain      OBJECTIVE: on ARLEEN bed, heels floated off pillows, Nsg just cleaned pt of BM and in process of finishing care  WOUND TYPE, LOCATION, CHARACTERISTICS (Pressure ulcers: location, stage, POA or date identified)  Pressure Ulcer  Deep Tissue Injury  Buttock Left 6/15/18  Periwound Skin: Intact  Drainage : None       Tissue Type and %:    Purple/scant reddish 100%  Wound Edges:    closed    Odor:     None   Exposed structure(s):   No   Signs and Symptoms of Infection: None     Measurements: 6/19/18  Length (cm): 1  Width (cm): 3  Depth (cm):  (nm intact)      Tracts/undermining: None     INTERVENTIONS BY WOUND TEAM: pt turned to L side, viewed and palpated wound, left MADAN.  Dressing Options: Open to Air    Interdisciplinary consultation:   With Nursing;With Patient     EVALUATION: pt has DTI inside L buttock adjacent to anus    Factors affecting wound healing: decreased mobility, stroke, cirrhosis, HTN, trauma  Goals: maintain intact skin until DTI reveals      NURSING PLAN OF CARE ORDERS (X):    Dressing changes: See Dressing Maintenance orders:   Skin care: See Skin Care orders: x  Rectal tube care: See Rectal Tube Care orders:   Other orders:    RSKIN: CURRENT (X) ORDERED (O)  Q shift Ricky:  X  Q shift pressure point assessments:  X  Pressure redistribution mattress  x      ARLEEN      Bariatric ARLEEN      Bariatric foam        Heel float boots       Heels floated off pillows    x  Barrier wipes   x   Barrier Cream      Barrier paste  x    Sacral silicone dressing  x    Padded O2 tubing      Anchorfast      Trach with Optifoam split foam  x     Waffle cushion      Rectal tube or BMS      Antifungal tx    Turn q 2 hours x  Up to  chair  Ambulate   PT/OT     Dietician   x   PO     TF x  TPN     PVN    NPO   # days   Other       WOUND TEAM PLAN OF CARE (X):   NPWT change 3 x week:        Dressing changes by wound team:       Follow up as needed:   x    Other (explain):    Anticipated discharge plans (X):  SNF:           Home Care:           Outpatient Wound Center:            Self Care:            Other:  tbd

## 2018-06-19 NOTE — PROCEDURES
NERVE CONDUCTION STUDIES AND ELECTROMYOGRAPHY REPORT           DOS: 06/18/18        Referring provider: Dr. Rush.         SUMMARY OF PATIENT'S CLINICAL HISTORY, AND RATIONALE FOR TESTING:     Mr. Devonte Sotelo 63 y.o. presenting with generalized weakness after sustaining polytrauma. Studies for work up of possible GBS.      Past Medical History is significant for :        Past Medical History:   Diagnosis Date   • BPH (benign prostatic hyperplasia)     • COPD (chronic obstructive pulmonary disease) (HCC)     • History of pulmonary embolus (PE)           ELECTRODIAGNOSTIC EXAMINATION:  Nerve conduction studies (NCS) and electromyography (EMG) are utilized to evaluate direct or indirect damage to the peripheral nervous system. NCS are performed to measure the nerve(s) response(s) to electrostimulation across a given nerve segment. EMG evaluates the passive and active electrical activity of the muscle(s) in question.  Muscles are innervated by specific peripheral nerves and roots. Often times, several nerves the muscle to be examined in order to determine the presence or absence of the disease process. Furthermore, nerves and muscles may need to be tested in a [id number] comparison, as well as in additional extremities, as this may be crucial in characterizing the extent of the disease process, which may be diffuse or isolated and of varying degree of severity. The extent of the neurodiagnostic exam is justified as it may help arrive to a proper diagnosis, which ultimately may contribute to better management of the patient. Therefore, the nerves to muscles examined during the study were medically necessary.     Unless otherwise noted, temperature of the extremity(s) study was monitored before and during the examination and remained between 32 and 36 degrees C for the upper extremities, and between 30 and 36 degrees C for the lower extremities.        NERVE CONDUCTION STUDIES:  Sensory nerves:   - Left Median  sensory nerve was examined. There was a mildly decreased sensory nerve action potential amplitude and mildly decreased conduction velocity.   - Left Ulnar sensory nerve was examined. The response was within normal limits.  - Left Sural nerve was tested. No responses could be obtained.      Motor nerves:   - Left Median motor nerve was examined. Recording electrode placed at the Abductor Pollicis Brevis muscle. There was mildly prolonged distal latency and a moderately decreased compound motor action potential amplitude.   - Left Ulnar motor nerve was examined. Recording electrode placed at the Abductor Digiti Minimi muscle. There was moderately decreased compound motor action potential amplitude.  - Left Tibial nerve was examined. Recording electrod placed at the Abductor Hallucis muscle. There was mildly decreased compound motor action potential amplitude (one should note there was edema underneath this recording electrode).   - Left Deep Peroneal motor nerve was examined. Recording electrode placed at the Extensor Digitorum Brevis muscle. There was a moderately prolonged latency and a severely decreased compound motor action potential amplitude (however there was significant edema on the patient's foot which may have obscured the results).      LATE RESPONSES:  F waves were assessed in the following nerves: left median, left ulnar.   The responses within normal limits for the patient's age.        ELECTROMYOGRAPHY:  The study was performed the concentric needle electrode. Fibrillation and fasciculation activity is graded by convention from none (0) to continuous (4+).  Needle electrode examination was performed in the following muscles: left vastus lateralis, left tibialis anterior, left medial gastrocnemius, left extensor digitorum brevis and left abductor hallucis. The muscles tested demonstrated normal inserctional activity, however no recruitment could be elicited. There were no elements suggestive of active  denervation.           Nerve Conduction Studies      Stim Site NR Peak (ms) Norm Peak (ms) O-P Amp (µV) Norm O-P Amp Site1 Site2 Delta-P (ms) Dist (cm) Saul (m/s) Norm Saul (m/s)   Left Median Anti Sensory (2nd Digit)   Wrist    3.2 <3.8 *9.0 >10 Wrist 2nd Digit 3.2 14.0 *44 >50   Left Sural Anti Sensory (Lat Mall)   Calf *NR       >3 Calf Lat Mall   14.0   >40   Left Ulnar Anti Sensory (5th Digit)   Wrist    2.7 <3.2 8.2 >5 Wrist 5th Digit 2.7 14.0 52 >50          Stim Site NR Onset (ms) Norm Onset (ms) O-P Amp (mV) Norm O-P Amp Site1 Site2 Delta-0 (ms) Dist (cm) Saul (m/s) Norm Saul (m/s)   Left Median Motor (Abd Poll Brev)   Wrist    *4.1 <4 *2.3 >5 Elbow Wrist 4.9 26.5 54 >50   Elbow    9.0   2.1                 Left Peroneal EDB Motor (Ext Dig Brev)   Ankle    *6.6 <6 *0.1 >2.5 B Fib Ankle 7.4 32.0 43 >40   B Fib    14.0   0.1   Poplt B Fib 9.4 10.0 11     Poplt    23.4   0.0                 Left Tibial Motor (Abd Domínguez Brev)   Ankle    4.9 <6 *3.4 >4 Knee Ankle 9.8 45.0 46 >40   Knee    14.7   2.0                 Left Ulnar Motor (Abd Dig Min)   Wrist    2.8 <3.1 *3.2 >7 B Elbow Wrist 3.4 20.0 59 >50   B Elbow    6.2   2.1   A Elbow B Elbow 1.5 10.0 67     A Elbow    7.7   2.8                    F Wave Studies      NR F-Lat (ms) Lat Norm (ms)   Left Median (Abd Poll Brev)      27.50 <31   Left Peroneal EDB (Ext Dig Brev)   *NR   <57   Left Ulnar (Abd Dig Min)      30.47 <32                                     Electromyography      Side Muscle Nerve Root Ins Act Fibs Psw Amp Dur Poly Recrt Int Pat Comment   Left VastusLat Femoral L2-4 Nml Nml Nml Nml Nml 0 *NoActivity Nml     Left AntTibialis Dp Br Fibular L4-5 Nml Nml Nml Nml Nml 0 *NoActivity Nml     Left Gastroc Tibial S1-2 Nml Nml Nml Nml Nml 0 *NoActivity Nml     Left Ext Dig Brev Dp Br Fibular L5, S1 Nml Nml Nml Nml Nml 0 *NoActivity Nml     Left AbdHallucis MedPlantar S1-2 Nml Nml Nml Nml Nml 0 *NoActivity Nml                 DIAGNOSTIC INTERPRETATION:    Extensive electrodiagnostic studies were performed to the left upper and left lower extremities. The studies were abnormal. The study was limited by edema.      DIAGNOSIS:   1)-Acute, moderate to severe, widespread, axonal, sensory and motor polyneuropathy.      DISCUSSION:   For this particular patient, the findings may represent either an Acute Motor and Sensory Axonal Neuropathy (AMSAN, which is an axonal form of GBS), versus Critical Illness Polyneuropathy (CIP). At this point in time, impossible to differentiate the two on the basis of this test, but overall favoring AMSAN over CIP.      Updates provided to Dr. Yang.         Domenico Vanegas MD  Medical Director, Epilepsy and Neurodiagnostics.   Clinical  of Neurology [facility].   Diplomate in Neurology, Epilepsy, and Electrodiagnostic Medicine.   Office: [phone]  Fax: [phone]      ALEKSEY ANAND    DD:  06/18/2018 17:29:45  DT:  06/18/2018 17:37:02    D#:  [id number]  Job#:  [id number]

## 2018-06-19 NOTE — PROGRESS NOTES
Dr. Puente at bedside for temporary dialysis catheter placement. Medications given per MAR. Consent signed and placed in chart.
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
## 2018-06-19 NOTE — CARE PLAN
Problem: Infection  Goal: Will remain free from infection  Outcome: PROGRESSING AS EXPECTED    Intervention: Implement standard precautions and perform hand washing before and after patient contact  Hand hygiene performed before and after assessing patient. Daily CHG bath completed. Scrub the hub prior to accessing IVs. Linens changed daily and PRN when soiled.

## 2018-06-20 ENCOUNTER — APPOINTMENT (OUTPATIENT)
Dept: RADIOLOGY | Facility: MEDICAL CENTER | Age: 63
DRG: 003 | End: 2018-06-20
Attending: SURGERY
Payer: COMMERCIAL

## 2018-06-20 LAB
ANION GAP SERPL CALC-SCNC: 8 MMOL/L (ref 0–11.9)
APTT PPP: 35.4 SEC (ref 24.7–36)
BASOPHILS # BLD AUTO: 0.3 % (ref 0–1.8)
BASOPHILS # BLD: 0.07 K/UL (ref 0–0.12)
BUN SERPL-MCNC: 36 MG/DL (ref 8–22)
CALCIUM SERPL-MCNC: 8.5 MG/DL (ref 8.5–10.5)
CHLORIDE SERPL-SCNC: 109 MMOL/L (ref 96–112)
CO2 SERPL-SCNC: 29 MMOL/L (ref 20–33)
CREAT SERPL-MCNC: 0.9 MG/DL (ref 0.5–1.4)
EOSINOPHIL # BLD AUTO: 0.32 K/UL (ref 0–0.51)
EOSINOPHIL NFR BLD: 1.6 % (ref 0–6.9)
ERYTHROCYTE [DISTWIDTH] IN BLOOD BY AUTOMATED COUNT: 51.7 FL (ref 35.9–50)
FIBRINOGEN PPP-MCNC: 159 MG/DL (ref 215–460)
GLUCOSE SERPL-MCNC: 128 MG/DL (ref 65–99)
HCT VFR BLD AUTO: 29.4 % (ref 42–52)
HGB BLD-MCNC: 9.1 G/DL (ref 14–18)
IMM GRANULOCYTES # BLD AUTO: 0.21 K/UL (ref 0–0.11)
IMM GRANULOCYTES NFR BLD AUTO: 1 % (ref 0–0.9)
INR PPP: 1.48 (ref 0.87–1.13)
LYMPHOCYTES # BLD AUTO: 3.31 K/UL (ref 1–4.8)
LYMPHOCYTES NFR BLD: 16.1 % (ref 22–41)
MCH RBC QN AUTO: 30 PG (ref 27–33)
MCHC RBC AUTO-ENTMCNC: 31 G/DL (ref 33.7–35.3)
MCV RBC AUTO: 97 FL (ref 81.4–97.8)
MONOCYTES # BLD AUTO: 1.29 K/UL (ref 0–0.85)
MONOCYTES NFR BLD AUTO: 6.3 % (ref 0–13.4)
NEUTROPHILS # BLD AUTO: 15.34 K/UL (ref 1.82–7.42)
NEUTROPHILS NFR BLD: 74.7 % (ref 44–72)
NRBC # BLD AUTO: 0 K/UL
NRBC BLD-RTO: 0 /100 WBC
PLATELET # BLD AUTO: 492 K/UL (ref 164–446)
PMV BLD AUTO: 11.9 FL (ref 9–12.9)
POTASSIUM SERPL-SCNC: 3.5 MMOL/L (ref 3.6–5.5)
PROTHROMBIN TIME: 17.6 SEC (ref 12–14.6)
RBC # BLD AUTO: 3.03 M/UL (ref 4.7–6.1)
SODIUM SERPL-SCNC: 146 MMOL/L (ref 135–145)
WBC # BLD AUTO: 20.5 K/UL (ref 4.8–10.8)

## 2018-06-20 PROCEDURE — 85610 PROTHROMBIN TIME: CPT

## 2018-06-20 PROCEDURE — 700111 HCHG RX REV CODE 636 W/ 250 OVERRIDE (IP): Performed by: INTERNAL MEDICINE

## 2018-06-20 PROCEDURE — 97530 THERAPEUTIC ACTIVITIES: CPT

## 2018-06-20 PROCEDURE — 700102 HCHG RX REV CODE 250 W/ 637 OVERRIDE(OP): Performed by: SURGERY

## 2018-06-20 PROCEDURE — 71045 X-RAY EXAM CHEST 1 VIEW: CPT

## 2018-06-20 PROCEDURE — 99291 CRITICAL CARE FIRST HOUR: CPT | Performed by: SURGERY

## 2018-06-20 PROCEDURE — A9270 NON-COVERED ITEM OR SERVICE: HCPCS | Performed by: SURGERY

## 2018-06-20 PROCEDURE — 770022 HCHG ROOM/CARE - ICU (200)

## 2018-06-20 PROCEDURE — P9045 ALBUMIN (HUMAN), 5%, 250 ML: HCPCS | Performed by: INTERNAL MEDICINE

## 2018-06-20 PROCEDURE — 85025 COMPLETE CBC W/AUTO DIFF WBC: CPT

## 2018-06-20 PROCEDURE — 94003 VENT MGMT INPAT SUBQ DAY: CPT

## 2018-06-20 PROCEDURE — 700112 HCHG RX REV CODE 229: Performed by: SURGERY

## 2018-06-20 PROCEDURE — 36514 APHERESIS PLASMA: CPT

## 2018-06-20 PROCEDURE — 80048 BASIC METABOLIC PNL TOTAL CA: CPT

## 2018-06-20 PROCEDURE — 85384 FIBRINOGEN ACTIVITY: CPT

## 2018-06-20 PROCEDURE — 97112 NEUROMUSCULAR REEDUCATION: CPT

## 2018-06-20 PROCEDURE — 85730 THROMBOPLASTIN TIME PARTIAL: CPT

## 2018-06-20 PROCEDURE — 94640 AIRWAY INHALATION TREATMENT: CPT

## 2018-06-20 PROCEDURE — 700111 HCHG RX REV CODE 636 W/ 250 OVERRIDE (IP): Performed by: SURGERY

## 2018-06-20 PROCEDURE — 700101 HCHG RX REV CODE 250: Performed by: SURGERY

## 2018-06-20 RX ORDER — CALCIUM CHLORIDE 100 MG/ML
1998 INJECTION INTRAVENOUS; INTRAVENTRICULAR ONCE
Status: COMPLETED | OUTPATIENT
Start: 2018-06-20 | End: 2018-06-20

## 2018-06-20 RX ORDER — ALBUMIN, HUMAN INJ 5% 5 %
5000 SOLUTION INTRAVENOUS ONCE
Status: COMPLETED | OUTPATIENT
Start: 2018-06-20 | End: 2018-06-21

## 2018-06-20 RX ADMIN — IPRATROPIUM BROMIDE AND ALBUTEROL SULFATE 3 ML: .5; 3 SOLUTION RESPIRATORY (INHALATION) at 19:25

## 2018-06-20 RX ADMIN — POLYETHYLENE GLYCOL 3350 1 PACKET: 17 POWDER, FOR SOLUTION ORAL at 20:26

## 2018-06-20 RX ADMIN — PROPRANOLOL HYDROCHLORIDE 10 MG: 10 TABLET ORAL at 14:33

## 2018-06-20 RX ADMIN — PROPRANOLOL HYDROCHLORIDE 10 MG: 10 TABLET ORAL at 22:17

## 2018-06-20 RX ADMIN — IPRATROPIUM BROMIDE AND ALBUTEROL SULFATE 3 ML: .5; 3 SOLUTION RESPIRATORY (INHALATION) at 07:27

## 2018-06-20 RX ADMIN — CALCIUM CHLORIDE 2 G: 100 INJECTION INTRAVENOUS; INTRAVENTRICULAR at 10:52

## 2018-06-20 RX ADMIN — SPIRONOLACTONE 50 MG: 50 TABLET ORAL at 15:33

## 2018-06-20 RX ADMIN — FAMOTIDINE 20 MG: 20 TABLET ORAL at 08:55

## 2018-06-20 RX ADMIN — IPRATROPIUM BROMIDE AND ALBUTEROL SULFATE 3 ML: .5; 3 SOLUTION RESPIRATORY (INHALATION) at 02:16

## 2018-06-20 RX ADMIN — IPRATROPIUM BROMIDE AND ALBUTEROL SULFATE 3 ML: .5; 3 SOLUTION RESPIRATORY (INHALATION) at 10:48

## 2018-06-20 RX ADMIN — RIFAXIMIN 550 MG: 550 TABLET ORAL at 20:25

## 2018-06-20 RX ADMIN — ENOXAPARIN SODIUM 30 MG: 100 INJECTION SUBCUTANEOUS at 08:55

## 2018-06-20 RX ADMIN — SENNOSIDES AND DOCUSATE SODIUM 1 TABLET: 8.6; 5 TABLET ORAL at 20:26

## 2018-06-20 RX ADMIN — AMLODIPINE BESYLATE 5 MG: 10 TABLET ORAL at 08:55

## 2018-06-20 RX ADMIN — SPIRONOLACTONE 50 MG: 50 TABLET ORAL at 05:37

## 2018-06-20 RX ADMIN — IPRATROPIUM BROMIDE AND ALBUTEROL SULFATE 3 ML: .5; 3 SOLUTION RESPIRATORY (INHALATION) at 13:47

## 2018-06-20 RX ADMIN — OXYCODONE HYDROCHLORIDE 10 MG: 5 TABLET ORAL at 14:33

## 2018-06-20 RX ADMIN — RIFAXIMIN 550 MG: 550 TABLET ORAL at 08:56

## 2018-06-20 RX ADMIN — FAMOTIDINE 20 MG: 20 TABLET ORAL at 20:26

## 2018-06-20 RX ADMIN — FUROSEMIDE 40 MG: 10 INJECTION, SOLUTION INTRAMUSCULAR; INTRAVENOUS at 08:55

## 2018-06-20 RX ADMIN — OXYCODONE HYDROCHLORIDE 10 MG: 5 TABLET ORAL at 08:55

## 2018-06-20 RX ADMIN — IPRATROPIUM BROMIDE AND ALBUTEROL SULFATE 3 ML: .5; 3 SOLUTION RESPIRATORY (INHALATION) at 23:00

## 2018-06-20 RX ADMIN — DOCUSATE SODIUM 100 MG: 50 LIQUID ORAL at 08:56

## 2018-06-20 RX ADMIN — POTASSIUM BICARBONATE 50 MEQ: 25 TABLET, EFFERVESCENT ORAL at 08:56

## 2018-06-20 RX ADMIN — POTASSIUM BICARBONATE 50 MEQ: 25 TABLET, EFFERVESCENT ORAL at 20:26

## 2018-06-20 RX ADMIN — PROPRANOLOL HYDROCHLORIDE 10 MG: 10 TABLET ORAL at 05:37

## 2018-06-20 RX ADMIN — ALBUMIN (HUMAN) 250 G: 2.5 SOLUTION INTRAVENOUS at 10:46

## 2018-06-20 RX ADMIN — ENOXAPARIN SODIUM 30 MG: 100 INJECTION SUBCUTANEOUS at 20:25

## 2018-06-20 RX ADMIN — DOCUSATE SODIUM 100 MG: 50 LIQUID ORAL at 20:26

## 2018-06-20 ASSESSMENT — COGNITIVE AND FUNCTIONAL STATUS - GENERAL
PERSONAL GROOMING: TOTAL
HELP NEEDED FOR BATHING: TOTAL
DAILY ACTIVITIY SCORE: 6
TURNING FROM BACK TO SIDE WHILE IN FLAT BAD: UNABLE
MOBILITY SCORE: 6
TOILETING: TOTAL
STANDING UP FROM CHAIR USING ARMS: TOTAL
WALKING IN HOSPITAL ROOM: TOTAL
SUGGESTED CMS G CODE MODIFIER MOBILITY: CN
MOVING FROM LYING ON BACK TO SITTING ON SIDE OF FLAT BED: UNABLE
MOVING TO AND FROM BED TO CHAIR: UNABLE
DRESSING REGULAR UPPER BODY CLOTHING: TOTAL
SUGGESTED CMS G CODE MODIFIER DAILY ACTIVITY: CN
EATING MEALS: TOTAL
DRESSING REGULAR LOWER BODY CLOTHING: TOTAL
CLIMB 3 TO 5 STEPS WITH RAILING: TOTAL

## 2018-06-20 ASSESSMENT — GAIT ASSESSMENTS: GAIT LEVEL OF ASSIST: UNABLE TO PARTICIPATE

## 2018-06-20 NOTE — CARE PLAN
Problem: Pain Management  Goal: Pain level will decrease to patient's comfort goal    Intervention: Follow pain managment plan developed in collaboration with patient and Interdisciplinary Team  Pain assessed and pain medication provided per MAR       Problem: Mobility  Goal: Risk for activity intolerance will decrease    Intervention: Encourage patient to increase activity level in collaboration with Interdisciplinary Team  Patient mobilized to edge of bed with team. ROM performed by RN. Patient encouraged with mobility.

## 2018-06-20 NOTE — THERAPY
"Physical Therapy Treatment completed.   Bed Mobility:  Supine to Sit: Total Assist X 2  Transfers: Sit to Stand: Unable to Participate  Gait: Level Of Assist: Unable to Participate with No Equipment Needed       Plan of Care: Will benefit from Physical Therapy 3 times per week  Discharge Recommendations: Equipment: Will Continue to Assess for Equipment Needs.    Pt more alert today, tracking with eyes better and able to turn to R/L with head when instructed by therapist. Emergence of toe/hand movement, however is very difficult to determine what movements are intentional and which are reactionary. 25% accurate on L foot for movements with verbal/tactile cues, 5% on R. Pt wiggled B toes on command x1 when supine. Able to actively PF  on L LE with tactile input, unable to do so on R. PROM of B hips/knees into flexion/extension patterns, absent contraction of musculature with movements. When sitting EOB absent LE movements on command. Therapist required to place feet flat on ground. Facilitated weight shift L/R when sitting EOB to improve core musculature and proprioceptive input. Pt demonstrated emergence of trunk activation with weight shift L, continutes to be absent when attempting to weight shift R. When sitting EOB, even with optimal positioning by therapist, pt keeps weight shifted onto L hip. Pt does have noticable brusing to R hip region around greater trochanter, may benefit from imaging to rule out R hip injury as he has been very reluctant to weight bear on this side. Continue to recommend post acute rehabiliation. Acute PT to continue to follow while in house.     See \"Rehab Therapy-Acute\" Patient Summary Report for complete documentation.       "

## 2018-06-20 NOTE — CARE PLAN
Problem: Pain Management  Goal: Pain level will decrease to patient's comfort goal    Intervention: Follow pain managment plan developed in collaboration with patient and Interdisciplinary Team  Pain assessed and patient medicated per the MAR       Problem: Skin Integrity  Goal: Risk for impaired skin integrity will decrease    Intervention: Implement precautions to protect skin integrity in collaboration with the interdisciplinary team  Patient turned and repositioned every 2 hours with the use of pillows and the draw sheet sheet. Full skin assessment performed ensuring no devices under skin. Heel float boots in use and upper extremities floated on pillows. ROM performed with all extremities. Mepilex on sacrum.
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
## 2018-06-20 NOTE — THERAPY
"Occupational Therapy Treatment completed with focus on ADLs, ADL transfers, cognition and upper extremity function.  Functional Status:  Observed pt in supine moving BUE distally, noted bilateral elbow flexion, increased head control flexion and extension, however when asked on command to move BUE did not consistently initiate movement. Completed PROM of BUE continued edema 2+ especially in RUE/hand. Fluctuating tone in R bicep w/quick stretch. Pt followed commands to close eyes and open mouth as well as look in therapist direction. Facilitated EOB sitting w/total assist x2, less lateral push, however as very poor postural control. No functional movement of BUE in sitting, attempted to facilitate bilateral scapular protraction and retraction, did grimace w/touch to R side. LUE w/continued inferior subluxation  (~3 fingers), tolerates joint approximation and distal support at elbow, no grimacing noted. Attempted to engage BUE w/WB in sitting no muscle contraction observed. Tolerated EOB sitting ~15 min and then began to appear fatigued, at which point pt did nodd his head yes when asked if he was tired and yes that he wanted to lay down. Difficult to assess pts consistently w/command following and gestures responses, ~50% consistent w/single step commands prior to fatigue. Total assist x2 sit>supine BTB, propped BUE on pillows w/proximal joint support at shoulders. Discussed w/RN and MD re: progress.   Plan of Care: Will benefit from Occupational Therapy 3 times per week   Discharge Recommendations:  Equipment Will Continue to Assess for Equipment Needs. Post-acute therapy Discharge to a transitional care facility for continued skilled therapy services.    See \"Rehab Therapy-Acute\" Patient Summary Report for complete documentation.   "

## 2018-06-20 NOTE — PROGRESS NOTES
TPE treatment # 3 today using Albumin replacement.RIJ temp CVC run reversed.Treatment tolerated well.CVC locked with ACD post treatment.Report given to primary Rn.

## 2018-06-20 NOTE — CARE PLAN
Problem: Mobility  Goal: Risk for activity intolerance will decrease  Outcome: PROGRESSING AS EXPECTED  Mobilized patient to edge of bed with assistance of 3 RNs. Patient sat at edge of bed for 10 minutes with no apparent increased work of breathing.

## 2018-06-21 ENCOUNTER — APPOINTMENT (OUTPATIENT)
Dept: RADIOLOGY | Facility: MEDICAL CENTER | Age: 63
DRG: 003 | End: 2018-06-21
Attending: SURGERY
Payer: COMMERCIAL

## 2018-06-21 LAB
ANION GAP SERPL CALC-SCNC: 9 MMOL/L (ref 0–11.9)
ANISOCYTOSIS BLD QL SMEAR: ABNORMAL
APTT PPP: 37.8 SEC (ref 24.7–36)
BACTERIA CSF CULT: NORMAL
BASOPHILS # BLD AUTO: 0 % (ref 0–1.8)
BASOPHILS # BLD: 0 K/UL (ref 0–0.12)
BUN SERPL-MCNC: 36 MG/DL (ref 8–22)
CALCIUM SERPL-MCNC: 8.6 MG/DL (ref 8.5–10.5)
CHLORIDE SERPL-SCNC: 109 MMOL/L (ref 96–112)
CO2 SERPL-SCNC: 28 MMOL/L (ref 20–33)
CREAT SERPL-MCNC: 0.88 MG/DL (ref 0.5–1.4)
EOSINOPHIL # BLD AUTO: 0.72 K/UL (ref 0–0.51)
EOSINOPHIL NFR BLD: 3.5 % (ref 0–6.9)
ERYTHROCYTE [DISTWIDTH] IN BLOOD BY AUTOMATED COUNT: 51.3 FL (ref 35.9–50)
FIBRINOGEN PPP-MCNC: 184 MG/DL (ref 215–460)
GLUCOSE SERPL-MCNC: 126 MG/DL (ref 65–99)
GRAM STN SPEC: NORMAL
HCT VFR BLD AUTO: 28.3 % (ref 42–52)
HGB BLD-MCNC: 9.2 G/DL (ref 14–18)
INR PPP: 1.48 (ref 0.87–1.13)
LYMPHOCYTES # BLD AUTO: 2.87 K/UL (ref 1–4.8)
LYMPHOCYTES NFR BLD: 14 % (ref 22–41)
MACROCYTES BLD QL SMEAR: ABNORMAL
MAGNESIUM SERPL-MCNC: 2.4 MG/DL (ref 1.5–2.5)
MANUAL DIFF BLD: NORMAL
MCH RBC QN AUTO: 31.2 PG (ref 27–33)
MCHC RBC AUTO-ENTMCNC: 32.5 G/DL (ref 33.7–35.3)
MCV RBC AUTO: 95.9 FL (ref 81.4–97.8)
MICROCYTES BLD QL SMEAR: ABNORMAL
MONOCYTES # BLD AUTO: 0.72 K/UL (ref 0–0.85)
MONOCYTES NFR BLD AUTO: 3.5 % (ref 0–13.4)
MORPHOLOGY BLD-IMP: NORMAL
NEUTROPHILS # BLD AUTO: 16.2 K/UL (ref 1.82–7.42)
NEUTROPHILS NFR BLD: 79 % (ref 44–72)
NRBC # BLD AUTO: 0 K/UL
NRBC BLD-RTO: 0 /100 WBC
PHOSPHATE SERPL-MCNC: 3.7 MG/DL (ref 2.5–4.5)
PLATELET # BLD AUTO: 481 K/UL (ref 164–446)
PLATELET BLD QL SMEAR: NORMAL
PMV BLD AUTO: 12 FL (ref 9–12.9)
POLYCHROMASIA BLD QL SMEAR: NORMAL
POTASSIUM SERPL-SCNC: 4.1 MMOL/L (ref 3.6–5.5)
PROTHROMBIN TIME: 17.6 SEC (ref 12–14.6)
RBC # BLD AUTO: 2.95 M/UL (ref 4.7–6.1)
RBC BLD AUTO: PRESENT
SIGNIFICANT IND 70042: NORMAL
SITE SITE: NORMAL
SODIUM SERPL-SCNC: 146 MMOL/L (ref 135–145)
SOURCE SOURCE: NORMAL
VARIANT LYMPHS BLD QL SMEAR: NORMAL
WBC # BLD AUTO: 20.5 K/UL (ref 4.8–10.8)

## 2018-06-21 PROCEDURE — 71045 X-RAY EXAM CHEST 1 VIEW: CPT

## 2018-06-21 PROCEDURE — A9270 NON-COVERED ITEM OR SERVICE: HCPCS | Performed by: SURGERY

## 2018-06-21 PROCEDURE — 700102 HCHG RX REV CODE 250 W/ 637 OVERRIDE(OP): Performed by: SURGERY

## 2018-06-21 PROCEDURE — 83735 ASSAY OF MAGNESIUM: CPT

## 2018-06-21 PROCEDURE — 92522 EVALUATE SPEECH PRODUCTION: CPT

## 2018-06-21 PROCEDURE — 84100 ASSAY OF PHOSPHORUS: CPT

## 2018-06-21 PROCEDURE — 85384 FIBRINOGEN ACTIVITY: CPT

## 2018-06-21 PROCEDURE — 85610 PROTHROMBIN TIME: CPT

## 2018-06-21 PROCEDURE — G9172 VOICE GOAL STATUS: HCPCS | Mod: CH

## 2018-06-21 PROCEDURE — 94640 AIRWAY INHALATION TREATMENT: CPT

## 2018-06-21 PROCEDURE — 94003 VENT MGMT INPAT SUBQ DAY: CPT

## 2018-06-21 PROCEDURE — 700111 HCHG RX REV CODE 636 W/ 250 OVERRIDE (IP): Performed by: SURGERY

## 2018-06-21 PROCEDURE — 85007 BL SMEAR W/DIFF WBC COUNT: CPT

## 2018-06-21 PROCEDURE — 305246 HCHG SPEAKING VALVE ADAPTER

## 2018-06-21 PROCEDURE — L8501 TRACHEOSTOMY SPEAKING VALVE: HCPCS

## 2018-06-21 PROCEDURE — 80048 BASIC METABOLIC PNL TOTAL CA: CPT

## 2018-06-21 PROCEDURE — 97763 ORTHC/PROSTC MGMT SBSQ ENC: CPT

## 2018-06-21 PROCEDURE — P9045 ALBUMIN (HUMAN), 5%, 250 ML: HCPCS | Performed by: INTERNAL MEDICINE

## 2018-06-21 PROCEDURE — 85730 THROMBOPLASTIN TIME PARTIAL: CPT

## 2018-06-21 PROCEDURE — G9171 VOICE CURRENT STATUS: HCPCS | Mod: CK

## 2018-06-21 PROCEDURE — 36514 APHERESIS PLASMA: CPT

## 2018-06-21 PROCEDURE — 85027 COMPLETE CBC AUTOMATED: CPT

## 2018-06-21 PROCEDURE — 770022 HCHG ROOM/CARE - ICU (200)

## 2018-06-21 PROCEDURE — 700111 HCHG RX REV CODE 636 W/ 250 OVERRIDE (IP): Performed by: INTERNAL MEDICINE

## 2018-06-21 PROCEDURE — 700101 HCHG RX REV CODE 250: Performed by: SURGERY

## 2018-06-21 PROCEDURE — 99291 CRITICAL CARE FIRST HOUR: CPT | Performed by: SURGERY

## 2018-06-21 RX ORDER — CALCIUM CHLORIDE 100 MG/ML
INJECTION INTRAVENOUS; INTRAVENTRICULAR
Status: DISCONTINUED
Start: 2018-06-21 | End: 2018-06-21

## 2018-06-21 RX ORDER — ALBUMIN, HUMAN INJ 5% 5 %
250 SOLUTION INTRAVENOUS ONCE
Status: COMPLETED | OUTPATIENT
Start: 2018-06-21 | End: 2018-06-21

## 2018-06-21 RX ORDER — CALCIUM CHLORIDE 100 MG/ML
2 INJECTION INTRAVENOUS; INTRAVENTRICULAR ONCE
Status: COMPLETED | OUTPATIENT
Start: 2018-06-21 | End: 2018-06-21

## 2018-06-21 RX ADMIN — CALCIUM CHLORIDE 2 G: 100 INJECTION, SOLUTION INTRAVENOUS at 12:00

## 2018-06-21 RX ADMIN — OXYCODONE HYDROCHLORIDE 5 MG: 5 TABLET ORAL at 08:32

## 2018-06-21 RX ADMIN — SPIRONOLACTONE 50 MG: 50 TABLET ORAL at 17:19

## 2018-06-21 RX ADMIN — PROPRANOLOL HYDROCHLORIDE 10 MG: 10 TABLET ORAL at 05:54

## 2018-06-21 RX ADMIN — FUROSEMIDE 40 MG: 10 INJECTION, SOLUTION INTRAMUSCULAR; INTRAVENOUS at 08:33

## 2018-06-21 RX ADMIN — AMLODIPINE BESYLATE 5 MG: 10 TABLET ORAL at 08:31

## 2018-06-21 RX ADMIN — OXYCODONE HYDROCHLORIDE 10 MG: 5 TABLET ORAL at 17:20

## 2018-06-21 RX ADMIN — ENOXAPARIN SODIUM 30 MG: 100 INJECTION SUBCUTANEOUS at 08:33

## 2018-06-21 RX ADMIN — FAMOTIDINE 20 MG: 20 TABLET ORAL at 21:09

## 2018-06-21 RX ADMIN — OXYCODONE HYDROCHLORIDE 5 MG: 5 TABLET ORAL at 14:02

## 2018-06-21 RX ADMIN — ALBUMIN (HUMAN) 125 G: 2.5 SOLUTION INTRAVENOUS at 09:45

## 2018-06-21 RX ADMIN — RIFAXIMIN 550 MG: 550 TABLET ORAL at 08:32

## 2018-06-21 RX ADMIN — OXYCODONE HYDROCHLORIDE 10 MG: 5 TABLET ORAL at 00:22

## 2018-06-21 RX ADMIN — RIFAXIMIN 550 MG: 550 TABLET ORAL at 21:09

## 2018-06-21 RX ADMIN — POTASSIUM BICARBONATE 50 MEQ: 25 TABLET, EFFERVESCENT ORAL at 08:32

## 2018-06-21 RX ADMIN — PROPRANOLOL HYDROCHLORIDE 10 MG: 10 TABLET ORAL at 13:55

## 2018-06-21 RX ADMIN — SPIRONOLACTONE 50 MG: 50 TABLET ORAL at 05:53

## 2018-06-21 RX ADMIN — PROPRANOLOL HYDROCHLORIDE 10 MG: 10 TABLET ORAL at 21:09

## 2018-06-21 RX ADMIN — IPRATROPIUM BROMIDE AND ALBUTEROL SULFATE 3 ML: .5; 3 SOLUTION RESPIRATORY (INHALATION) at 03:25

## 2018-06-21 RX ADMIN — ENOXAPARIN SODIUM 30 MG: 100 INJECTION SUBCUTANEOUS at 21:09

## 2018-06-21 RX ADMIN — POTASSIUM BICARBONATE 50 MEQ: 25 TABLET, EFFERVESCENT ORAL at 21:09

## 2018-06-21 RX ADMIN — OXYCODONE HYDROCHLORIDE 10 MG: 5 TABLET ORAL at 21:09

## 2018-06-21 RX ADMIN — FAMOTIDINE 20 MG: 20 TABLET ORAL at 08:31

## 2018-06-21 NOTE — PROGRESS NOTES
TPE treatment were ordered by Dr. Lafleur, pt was not able to tolerate treatment due to pt RIJ was very positional, pt line was flushed X10, access rotated without any success. Dr. Lafleur was notified that I was not going to be able to finish treatment due to patient access issue. Gave report to Camila MELTON, pt RIj dressing are intact, no swelling or redness were noted. Access were locked with ACD ml/ml, 1.5 ml each port. Pt was unable to voice complaint of pain and SOB due to medical condition, no fever or any distress were noted post tx. Treatment started at 1115 and ended at 1231, see flow sheets for further details.

## 2018-06-21 NOTE — CARE PLAN
Problem: Infection  Goal: Will remain free from infection  Outcome: PROGRESSING AS EXPECTED  Monitoring labs and performing assessments to ensure that patient is not at greater risk for infection. Frequent hand hygiene is performed. Continuous assessments for discontinuing lines and drains that may be routes for infection.

## 2018-06-21 NOTE — CARE PLAN
Problem: Mobility  Goal: Risk for activity intolerance will decrease  Outcome: PROGRESSING AS EXPECTED  Patient will continue to sit at edge of bed until he is stable to increase activity. Staff will give him adequate rest periods between mobilizations.

## 2018-06-21 NOTE — CARE PLAN
Problem: Ventilation Defect:  Goal: Ability to achieve and maintain unassisted ventilation or tolerate decreased levels of ventilator support    Intervention: Support and monitor invasive and noninvasive mechanical ventilation  Adult Ventilation Update    Total Vent Days: 21    Patient Lines/Drains/Airways Status    Active Airway     Name: Placement date: Placement time: Site: Days:    Airway Group Portex Trach Tracheostomy 8.0 06/07/18   1055   Tracheostomy   15              Plateau Pressure (Q Shift):  (unable to obtain) (06/21/18 0325)  Static Compliance (ml / cm H2O): 73 (06/21/18 0325)    15 day post trache.     Sputum/Suction yes  Cough: Productive (06/21/18 0325)  Sputum Amount: Moderate (06/21/18 0325)  Sputum Color: Tan;Yellow (06/21/18 0325)  Sputum Consistency: Thick (06/21/18 0325)    Events/Summary/Plan: Pt stable on vent. Proceed with 24 hr t-piece trial in am.        Problem: Bronchoconstriction:  Goal: Improve in air movement and diminished wheezing  Outcome: PROGRESSING AS EXPECTED  Pt is on duoneb Q4

## 2018-06-21 NOTE — PROGRESS NOTES
Discussed xray and plan for possible dialysis catheter replacement with Dr. Gregory. He was able to review the xray and will address tomorrow the need for intervention.
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
## 2018-06-21 NOTE — PROGRESS NOTES
Per Dr Lafleur and dialysis RN the dialysis catheter was not working for plasmapheresis procedure to be completed.     Informed Dr. Gregory that replacement is needed to not disrupt therapy per Dr. Lafleur.     Dr Gregory ordered xray to determine location and I checked the exterior and it doesn't look dislodged.

## 2018-06-21 NOTE — THERAPY
"Speech Language Therapy Evaluation completed to address communication  Functional Status:  The patient was seen for speaking valve evaluation this date. The patient was awake, alert but unable to follow commands. The patient's vitals were stable on 12L FiO2 45%. RT aware of speaking valve orders and in agreement. The patient tolerated tracheal suctioning, cuff deflation and finger occlusion with stable vitals but only \"moaning\" like noise was noted. Speaking valve placed with coughing initially with slight back pressure noted upon removal. Speaking valve was replaced with no improved upper airway patency and no changes in vitals or back pressure noted. Patient had speaking valve on for 25 minutes before he fell asleep so valve was removed. Although no verbalizations were noted, patient did produce vocalizations with clear/strong vocal quality appreciated. Education provided to patient and his wife regarding role of SLP, speaking valve and further SLP services. Family grateful for information. At this time, recommend trained staff place speaking valve when family present as tolerated. SLP following closely.     Recommendations:  1) Okay for trained medical staff (RN/RT/Therapists) to place speaking valve with direct supervision.     Plan of Care: Will benefit from Speech Therapy 5 times per week    Post-Acute Therapy: Discharge to a transitional care facility for continued skilled therapy services.    See \"Rehab Therapy-Acute\" Patient Summary Report for complete documentation.   "
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
## 2018-06-21 NOTE — CARE PLAN
Problem: Ventilation Defect:  Goal: Ability to achieve and maintain unassisted ventilation or tolerate decreased levels of ventilator support  Outcome: PROGRESSING AS EXPECTED  VD 20/ TD 14  %  PEEP +10  FiO2 40%  Intervention: Support and monitor invasive and noninvasive mechanical ventilation  T-PIECE 12 HOURS ON, REST FOR 12 HOURS ON VENT.  IF ALL IS WELL, T-PIECE WILL GO FOR 24 HOUR TRIAL  50% @ 15 L      Problem: Bronchoconstriction:  Goal: Improve in air movement and diminished wheezing  Outcome: PROGRESSING AS EXPECTED  DUONEB Q4

## 2018-06-22 ENCOUNTER — APPOINTMENT (OUTPATIENT)
Dept: RADIOLOGY | Facility: MEDICAL CENTER | Age: 63
DRG: 003 | End: 2018-06-22
Attending: SURGERY
Payer: COMMERCIAL

## 2018-06-22 LAB
ANION GAP SERPL CALC-SCNC: 9 MMOL/L (ref 0–11.9)
APTT PPP: 41.8 SEC (ref 24.7–36)
BASOPHILS # BLD AUTO: 0.3 % (ref 0–1.8)
BASOPHILS # BLD: 0.06 K/UL (ref 0–0.12)
BUN SERPL-MCNC: 36 MG/DL (ref 8–22)
CALCIUM SERPL-MCNC: 8.7 MG/DL (ref 8.5–10.5)
CHLORIDE SERPL-SCNC: 108 MMOL/L (ref 96–112)
CO2 SERPL-SCNC: 27 MMOL/L (ref 20–33)
CREAT SERPL-MCNC: 0.84 MG/DL (ref 0.5–1.4)
EOSINOPHIL # BLD AUTO: 0.41 K/UL (ref 0–0.51)
EOSINOPHIL NFR BLD: 2.3 % (ref 0–6.9)
ERYTHROCYTE [DISTWIDTH] IN BLOOD BY AUTOMATED COUNT: 52.2 FL (ref 35.9–50)
FIBRINOGEN PPP-MCNC: 209 MG/DL (ref 215–460)
GLUCOSE SERPL-MCNC: 129 MG/DL (ref 65–99)
HCT VFR BLD AUTO: 28.7 % (ref 42–52)
HGB BLD-MCNC: 9.1 G/DL (ref 14–18)
IMM GRANULOCYTES # BLD AUTO: 0.14 K/UL (ref 0–0.11)
IMM GRANULOCYTES NFR BLD AUTO: 0.8 % (ref 0–0.9)
INR PPP: 1.47 (ref 0.87–1.13)
LYMPHOCYTES # BLD AUTO: 3.16 K/UL (ref 1–4.8)
LYMPHOCYTES NFR BLD: 17.6 % (ref 22–41)
MCH RBC QN AUTO: 30.8 PG (ref 27–33)
MCHC RBC AUTO-ENTMCNC: 31.7 G/DL (ref 33.7–35.3)
MCV RBC AUTO: 97.3 FL (ref 81.4–97.8)
MONOCYTES # BLD AUTO: 1.37 K/UL (ref 0–0.85)
MONOCYTES NFR BLD AUTO: 7.6 % (ref 0–13.4)
NEUTROPHILS # BLD AUTO: 12.85 K/UL (ref 1.82–7.42)
NEUTROPHILS NFR BLD: 71.4 % (ref 44–72)
NRBC # BLD AUTO: 0 K/UL
NRBC BLD-RTO: 0 /100 WBC
PLATELET # BLD AUTO: 427 K/UL (ref 164–446)
PMV BLD AUTO: 11.9 FL (ref 9–12.9)
POTASSIUM SERPL-SCNC: 4.3 MMOL/L (ref 3.6–5.5)
PROTHROMBIN TIME: 17.5 SEC (ref 12–14.6)
RBC # BLD AUTO: 2.95 M/UL (ref 4.7–6.1)
SODIUM SERPL-SCNC: 144 MMOL/L (ref 135–145)
WBC # BLD AUTO: 18 K/UL (ref 4.8–10.8)

## 2018-06-22 PROCEDURE — 700111 HCHG RX REV CODE 636 W/ 250 OVERRIDE (IP): Performed by: SURGERY

## 2018-06-22 PROCEDURE — A9270 NON-COVERED ITEM OR SERVICE: HCPCS | Performed by: SURGERY

## 2018-06-22 PROCEDURE — 700102 HCHG RX REV CODE 250 W/ 637 OVERRIDE(OP): Performed by: SURGERY

## 2018-06-22 PROCEDURE — 97530 THERAPEUTIC ACTIVITIES: CPT

## 2018-06-22 PROCEDURE — 94640 AIRWAY INHALATION TREATMENT: CPT

## 2018-06-22 PROCEDURE — 85384 FIBRINOGEN ACTIVITY: CPT

## 2018-06-22 PROCEDURE — 97112 NEUROMUSCULAR REEDUCATION: CPT

## 2018-06-22 PROCEDURE — 36556 INSERT NON-TUNNEL CV CATH: CPT

## 2018-06-22 PROCEDURE — P9045 ALBUMIN (HUMAN), 5%, 250 ML: HCPCS | Performed by: INTERNAL MEDICINE

## 2018-06-22 PROCEDURE — 85025 COMPLETE CBC W/AUTO DIFF WBC: CPT

## 2018-06-22 PROCEDURE — 700111 HCHG RX REV CODE 636 W/ 250 OVERRIDE (IP): Performed by: INTERNAL MEDICINE

## 2018-06-22 PROCEDURE — 700101 HCHG RX REV CODE 250: Performed by: SURGERY

## 2018-06-22 PROCEDURE — 02HV33Z INSERTION OF INFUSION DEVICE INTO SUPERIOR VENA CAVA, PERCUTANEOUS APPROACH: ICD-10-PCS | Performed by: SURGERY

## 2018-06-22 PROCEDURE — 85730 THROMBOPLASTIN TIME PARTIAL: CPT

## 2018-06-22 PROCEDURE — 71045 X-RAY EXAM CHEST 1 VIEW: CPT

## 2018-06-22 PROCEDURE — 36556 INSERT NON-TUNNEL CV CATH: CPT | Mod: RT | Performed by: SURGERY

## 2018-06-22 PROCEDURE — 85610 PROTHROMBIN TIME: CPT

## 2018-06-22 PROCEDURE — 770022 HCHG ROOM/CARE - ICU (200)

## 2018-06-22 PROCEDURE — 99291 CRITICAL CARE FIRST HOUR: CPT | Mod: 25 | Performed by: SURGERY

## 2018-06-22 PROCEDURE — 36514 APHERESIS PLASMA: CPT

## 2018-06-22 PROCEDURE — 80048 BASIC METABOLIC PNL TOTAL CA: CPT

## 2018-06-22 PROCEDURE — 94003 VENT MGMT INPAT SUBQ DAY: CPT

## 2018-06-22 PROCEDURE — 94669 MECHANICAL CHEST WALL OSCILL: CPT

## 2018-06-22 PROCEDURE — C1751 CATH, INF, PER/CENT/MIDLINE: HCPCS

## 2018-06-22 PROCEDURE — 700112 HCHG RX REV CODE 229: Performed by: SURGERY

## 2018-06-22 RX ORDER — ALBUMIN, HUMAN INJ 5% 5 %
250 SOLUTION INTRAVENOUS ONCE
Status: COMPLETED | OUTPATIENT
Start: 2018-06-22 | End: 2018-06-22

## 2018-06-22 RX ORDER — LORAZEPAM 2 MG/ML
2 INJECTION INTRAMUSCULAR EVERY 4 HOURS PRN
Status: DISCONTINUED | OUTPATIENT
Start: 2018-06-22 | End: 2018-07-04 | Stop reason: HOSPADM

## 2018-06-22 RX ORDER — ALBUMIN, HUMAN INJ 5% 5 %
250 SOLUTION INTRAVENOUS ONCE
Status: DISCONTINUED | OUTPATIENT
Start: 2018-06-22 | End: 2018-06-22

## 2018-06-22 RX ORDER — CALCIUM CHLORIDE 100 MG/ML
2 INJECTION INTRAVENOUS; INTRAVENTRICULAR ONCE
Status: COMPLETED | OUTPATIENT
Start: 2018-06-22 | End: 2018-06-22

## 2018-06-22 RX ORDER — VECURONIUM BROMIDE 1 MG/ML
10 INJECTION, POWDER, LYOPHILIZED, FOR SOLUTION INTRAVENOUS ONCE
Status: DISCONTINUED | OUTPATIENT
Start: 2018-06-22 | End: 2018-06-22

## 2018-06-22 RX ADMIN — POTASSIUM BICARBONATE 50 MEQ: 25 TABLET, EFFERVESCENT ORAL at 09:30

## 2018-06-22 RX ADMIN — ALBUMIN (HUMAN) 250 G: 2.5 SOLUTION INTRAVENOUS at 22:41

## 2018-06-22 RX ADMIN — SPIRONOLACTONE 50 MG: 50 TABLET ORAL at 16:23

## 2018-06-22 RX ADMIN — RIFAXIMIN 550 MG: 550 TABLET ORAL at 09:41

## 2018-06-22 RX ADMIN — PROPRANOLOL HYDROCHLORIDE 10 MG: 10 TABLET ORAL at 14:43

## 2018-06-22 RX ADMIN — LORAZEPAM 2 MG: 2 INJECTION INTRAMUSCULAR; INTRAVENOUS at 09:39

## 2018-06-22 RX ADMIN — FAMOTIDINE 20 MG: 20 TABLET ORAL at 20:24

## 2018-06-22 RX ADMIN — SODIUM BICARBONATE 3 ML: 84 INJECTION, SOLUTION INTRAVENOUS at 19:53

## 2018-06-22 RX ADMIN — ALBUTEROL SULFATE 2.5 MG: 2.5 SOLUTION RESPIRATORY (INHALATION) at 19:53

## 2018-06-22 RX ADMIN — MAGNESIUM HYDROXIDE 30 ML: 400 SUSPENSION ORAL at 09:31

## 2018-06-22 RX ADMIN — RIFAXIMIN 550 MG: 550 TABLET ORAL at 20:39

## 2018-06-22 RX ADMIN — AMLODIPINE BESYLATE 5 MG: 10 TABLET ORAL at 09:31

## 2018-06-22 RX ADMIN — FUROSEMIDE 40 MG: 10 INJECTION, SOLUTION INTRAMUSCULAR; INTRAVENOUS at 09:40

## 2018-06-22 RX ADMIN — DOCUSATE SODIUM 100 MG: 50 LIQUID ORAL at 09:31

## 2018-06-22 RX ADMIN — LORAZEPAM 2 MG: 2 INJECTION INTRAMUSCULAR; INTRAVENOUS at 20:23

## 2018-06-22 RX ADMIN — SPIRONOLACTONE 50 MG: 50 TABLET ORAL at 05:57

## 2018-06-22 RX ADMIN — ALBUTEROL SULFATE 2.5 MG: 2.5 SOLUTION RESPIRATORY (INHALATION) at 22:15

## 2018-06-22 RX ADMIN — ENOXAPARIN SODIUM 30 MG: 100 INJECTION SUBCUTANEOUS at 20:24

## 2018-06-22 RX ADMIN — SENNOSIDES AND DOCUSATE SODIUM 1 TABLET: 8.6; 5 TABLET ORAL at 20:24

## 2018-06-22 RX ADMIN — POLYETHYLENE GLYCOL 3350 1 PACKET: 17 POWDER, FOR SOLUTION ORAL at 20:24

## 2018-06-22 RX ADMIN — CALCIUM CHLORIDE 2 G: 100 INJECTION PARENTERAL at 22:41

## 2018-06-22 RX ADMIN — POLYETHYLENE GLYCOL 3350 1 PACKET: 17 POWDER, FOR SOLUTION ORAL at 16:23

## 2018-06-22 RX ADMIN — SODIUM BICARBONATE 3 ML: 84 INJECTION, SOLUTION INTRAVENOUS at 22:15

## 2018-06-22 RX ADMIN — OXYCODONE HYDROCHLORIDE 15 MG: 5 TABLET ORAL at 01:50

## 2018-06-22 RX ADMIN — DOCUSATE SODIUM 100 MG: 50 LIQUID ORAL at 20:39

## 2018-06-22 RX ADMIN — POTASSIUM BICARBONATE 50 MEQ: 25 TABLET, EFFERVESCENT ORAL at 20:24

## 2018-06-22 RX ADMIN — PROPRANOLOL HYDROCHLORIDE 10 MG: 10 TABLET ORAL at 05:57

## 2018-06-22 RX ADMIN — FAMOTIDINE 20 MG: 20 TABLET ORAL at 09:30

## 2018-06-22 ASSESSMENT — COGNITIVE AND FUNCTIONAL STATUS - GENERAL
HELP NEEDED FOR BATHING: TOTAL
PERSONAL GROOMING: TOTAL
EATING MEALS: TOTAL
TURNING FROM BACK TO SIDE WHILE IN FLAT BAD: UNABLE
WALKING IN HOSPITAL ROOM: TOTAL
MOVING TO AND FROM BED TO CHAIR: UNABLE
CLIMB 3 TO 5 STEPS WITH RAILING: TOTAL
DRESSING REGULAR LOWER BODY CLOTHING: TOTAL
SUGGESTED CMS G CODE MODIFIER MOBILITY: CN
MOBILITY SCORE: 6
DAILY ACTIVITIY SCORE: 6
TOILETING: TOTAL
MOVING FROM LYING ON BACK TO SITTING ON SIDE OF FLAT BED: UNABLE
STANDING UP FROM CHAIR USING ARMS: TOTAL
DRESSING REGULAR UPPER BODY CLOTHING: TOTAL
SUGGESTED CMS G CODE MODIFIER DAILY ACTIVITY: CN

## 2018-06-22 ASSESSMENT — GAIT ASSESSMENTS: GAIT LEVEL OF ASSIST: UNABLE TO PARTICIPATE

## 2018-06-22 NOTE — THERAPY
OT tx attempted this AM however pt recently given ativan and now resting, unable to arouse or participate in session at this time. Will check back in PM as appropriate
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
## 2018-06-22 NOTE — CARE PLAN
Problem: Venous Thromboembolism (VTW)/Deep Vein Thrombosis (DVT) Prevention:  Goal: Patient will participate in Venous Thrombosis (VTE)/Deep Vein Thrombosis (DVT)Prevention Measures  Outcome: MET Date Met: 06/22/18      Problem: Bowel/Gastric:  Goal: Normal bowel function is maintained or improved  Outcome: MET Date Met: 06/22/18  Tube feeding continues     Problem: Urinary Elimination:  Goal: Ability to reestablish a normal urinary elimination pattern will improve  Outcome: MET Date Met: 06/22/18

## 2018-06-22 NOTE — THERAPY
"Physical Therapy Treatment completed.   Bed Mobility:  Supine to Sit: Total Assist X 2  Transfers: Sit to Stand: Unable to Participate  Gait: Level Of Assist: Unable to Participate with No Equipment Needed       Plan of Care: Will benefit from Physical Therapy 3 times per week  Discharge Recommendations: Equipment: Will Continue to Assess for Equipment Needs.     Significant decline in cognition and mobility. Pt eyes open this afternoon, not tracking purposefully. If name stated very loudly 25% of time would track to find therapist, then would avert gaze. No voluntary or active movements of LEs, UEs, or core musculature. Attempted PROM of B LE to initiate movement, flaccid extremities. Pt would respond with tactile cues to neck/forehead to extend head, only would hold for about 10 seconds prior to resuming kyphotic posture. Continues to be difficult to discern what is cognitive vs true muscle deficits. Pt would smile appropriately when jokes made, however very poor command following. Absent righting reactions. Continues to have poor weight bearing through R hip when EOB. Will continue to follow while in house.    See \"Rehab Therapy-Acute\" Patient Summary Report for complete documentation.       "

## 2018-06-22 NOTE — CARE PLAN
Problem: Venous Thromboembolism (VTW)/Deep Vein Thrombosis (DVT) Prevention:  Goal: Patient will participate in Venous Thrombosis (VTE)/Deep Vein Thrombosis (DVT)Prevention Measures  SCD's applied bilaterally to lower extremities. Lovenox ordered and given.     Problem: Skin Integrity  Goal: Risk for impaired skin integrity will decrease  Q2 hour turns in place. Pillows/heel float boots in use. Mepilex on sacrum. Lines/tubes moved away from patient as needed.

## 2018-06-22 NOTE — CARE PLAN
Problem: Oxygenation:  Goal: Maintain adequate oxygenation dependent on patient condition  Outcome: PROGRESSING AS EXPECTED  Pt is on aerosol therapy via t-piece at 15 L 60%. Continue to monitor SpO2 and titrate O2 as needed.

## 2018-06-22 NOTE — CARE PLAN
Problem: Ventilation Defect:  Goal: Ability to achieve and maintain unassisted ventilation or tolerate decreased levels of ventilator support    Intervention: Support and monitor invasive and noninvasive mechanical ventilation  Adult Ventilation Update    Total Vent Days: 21    Patient Lines/Drains/Airways Status    Active Airway     Name: Placement date: Placement time: Site: Days:    Airway Group Portex Trach Tracheostomy 8.0 06/07/18   1055   Tracheostomy   15              (ASV) % MIN VOL: 100 (06/21/18 0643)  ASV Inspiratory Pressures- 6  % Spontaneous usually 100%    Sputum/Suction   Cough: Productive (06/21/18 1457)  Sputum Amount: Moderate (06/21/18 1457)  Sputum Color: Yellow;Tan (06/21/18 1457)  Sputum Consistency: Thick (06/21/18 1457)    Mobility  Activity Performed: Edge of bed   Time Activity Tolerated: 10 min     Events/Summary/Plan: placed on t-piece  At 0851 this am, ryan well, mod secretions(06/21/18 0851)

## 2018-06-22 NOTE — CARE PLAN
Problem: Ventilation Defect:  Goal: Ability to achieve and maintain unassisted ventilation or tolerate decreased levels of ventilator support    Intervention: Support and monitor invasive and noninvasive mechanical ventilation  Adult Ventilation Update    Total Vent Days: 1    Patient Lines/Drains/Airways Status    Active Airway     Name: Placement date: Placement time: Site: Days:    Airway Group Portex Trach Tracheostomy 8.0 06/07/18   1055   Tracheostomy   16              (ASV) % MIN VOL: 140 (06/22/18 1541)  ASV Inspiratory Pressures-15  % Spontaneous -varies    Sputum/Suction   Cough: Productive;Moist (06/22/18 1600)  Sputum Amount: Moderate (06/22/18 1600)  Sputum Color: Yellow (06/22/18 1600)  Sputum Consistency: Thick (06/22/18 1600)    Mobility  Activity Performed: Edge of bed (06/22/18 1400)  Time Activity Tolerated: 10 min (06/22/18 1400)  D  Events/Summary/Plan: PT placed back on vent, increased oxygen and wob despite ipv and svn and sxn (06/22/18 7324)
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
## 2018-06-22 NOTE — CARE PLAN
Problem: Pain Management  Goal: Pain level will decrease to patient's comfort goal  Outcome: MET Date Met: 06/22/18    Intervention: Follow pain managment plan developed in collaboration with patient and Interdisciplinary Team  Pt states he pain is under control         Problem: Respiratory:  Goal: Respiratory status will improve  Outcome: MET Date Met: 06/22/18  Clear breath sounds; RA    Problem: Mobility  Goal: Risk for activity intolerance will decrease  Outcome: MET Date Met: 06/22/18  Ambulated throughout the shift

## 2018-06-22 NOTE — THERAPY
"Occupational Therapy Treatment completed with focus on ADLs and upper extremity function.  Functional Status:  Pt seen for OT tx. Total A x2 supine > sit, once sitting EOB pt required total A for seated balance and no righting reaction or trunk control to come into forward position. L UE supported at elbow d/t subluxation w/ no purposeful movement today's session. Previous session pt was able to initiate movement while supine, no UE movement noted in supine in today's session. R UE less edematous but continues to have no purposeful movement and limited facilitation of ROM d/t pain. Pt not able to follow 1-step commands as consistently and when he did follow he required extra time and repeated cues. Pt able to track therapist to Rt and Lt w/ max cues and extra time but consistently. Pt often w/ a straight gaze. Total A to return back to supine.   Plan of Care: Will benefit from Occupational Therapy 3 times per week  Discharge Recommendations:  Equipment Will Continue to Assess for Equipment Needs.     See \"Rehab Therapy-Acute\" Patient Summary Report for complete documentation.   "
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
## 2018-06-22 NOTE — THERAPY
PT treatment attempted. Recent admin of Ativan, pt currently non responsive with flaccid extremties. Not appropriate for physical therapy services at this time. Will attempt this afternoon if more alert and able to participate.

## 2018-06-23 ENCOUNTER — APPOINTMENT (OUTPATIENT)
Dept: RADIOLOGY | Facility: MEDICAL CENTER | Age: 63
DRG: 003 | End: 2018-06-23
Attending: SURGERY
Payer: COMMERCIAL

## 2018-06-23 LAB
ANION GAP SERPL CALC-SCNC: 6 MMOL/L (ref 0–11.9)
BASOPHILS # BLD AUTO: 0.5 % (ref 0–1.8)
BASOPHILS # BLD: 0.08 K/UL (ref 0–0.12)
BUN SERPL-MCNC: 46 MG/DL (ref 8–22)
CALCIUM SERPL-MCNC: 8.6 MG/DL (ref 8.5–10.5)
CHLORIDE SERPL-SCNC: 108 MMOL/L (ref 96–112)
CO2 SERPL-SCNC: 29 MMOL/L (ref 20–33)
CREAT SERPL-MCNC: 0.9 MG/DL (ref 0.5–1.4)
EOSINOPHIL # BLD AUTO: 0.4 K/UL (ref 0–0.51)
EOSINOPHIL NFR BLD: 2.6 % (ref 0–6.9)
ERYTHROCYTE [DISTWIDTH] IN BLOOD BY AUTOMATED COUNT: 52.1 FL (ref 35.9–50)
GLUCOSE SERPL-MCNC: 120 MG/DL (ref 65–99)
HCT VFR BLD AUTO: 26.4 % (ref 42–52)
HGB BLD-MCNC: 8.1 G/DL (ref 14–18)
IMM GRANULOCYTES # BLD AUTO: 0.14 K/UL (ref 0–0.11)
IMM GRANULOCYTES NFR BLD AUTO: 0.9 % (ref 0–0.9)
LYMPHOCYTES # BLD AUTO: 3.26 K/UL (ref 1–4.8)
LYMPHOCYTES NFR BLD: 21.2 % (ref 22–41)
MCH RBC QN AUTO: 30.1 PG (ref 27–33)
MCHC RBC AUTO-ENTMCNC: 30.7 G/DL (ref 33.7–35.3)
MCV RBC AUTO: 98.1 FL (ref 81.4–97.8)
MONOCYTES # BLD AUTO: 1.11 K/UL (ref 0–0.85)
MONOCYTES NFR BLD AUTO: 7.2 % (ref 0–13.4)
NEUTROPHILS # BLD AUTO: 10.41 K/UL (ref 1.82–7.42)
NEUTROPHILS NFR BLD: 67.6 % (ref 44–72)
NRBC # BLD AUTO: 0 K/UL
NRBC BLD-RTO: 0 /100 WBC
PLATELET # BLD AUTO: 369 K/UL (ref 164–446)
PMV BLD AUTO: 12.3 FL (ref 9–12.9)
POTASSIUM SERPL-SCNC: 4.5 MMOL/L (ref 3.6–5.5)
RBC # BLD AUTO: 2.69 M/UL (ref 4.7–6.1)
SODIUM SERPL-SCNC: 143 MMOL/L (ref 135–145)
WBC # BLD AUTO: 15.4 K/UL (ref 4.8–10.8)

## 2018-06-23 PROCEDURE — 700102 HCHG RX REV CODE 250 W/ 637 OVERRIDE(OP): Performed by: SURGERY

## 2018-06-23 PROCEDURE — 94669 MECHANICAL CHEST WALL OSCILL: CPT

## 2018-06-23 PROCEDURE — P9045 ALBUMIN (HUMAN), 5%, 250 ML: HCPCS | Performed by: INTERNAL MEDICINE

## 2018-06-23 PROCEDURE — 700111 HCHG RX REV CODE 636 W/ 250 OVERRIDE (IP): Performed by: INTERNAL MEDICINE

## 2018-06-23 PROCEDURE — 770022 HCHG ROOM/CARE - ICU (200)

## 2018-06-23 PROCEDURE — 94640 AIRWAY INHALATION TREATMENT: CPT

## 2018-06-23 PROCEDURE — 71045 X-RAY EXAM CHEST 1 VIEW: CPT

## 2018-06-23 PROCEDURE — 94003 VENT MGMT INPAT SUBQ DAY: CPT

## 2018-06-23 PROCEDURE — 36514 APHERESIS PLASMA: CPT

## 2018-06-23 PROCEDURE — 80048 BASIC METABOLIC PNL TOTAL CA: CPT

## 2018-06-23 PROCEDURE — 36556 INSERT NON-TUNNEL CV CATH: CPT

## 2018-06-23 PROCEDURE — 700111 HCHG RX REV CODE 636 W/ 250 OVERRIDE (IP): Performed by: PSYCHIATRY & NEUROLOGY

## 2018-06-23 PROCEDURE — A9270 NON-COVERED ITEM OR SERVICE: HCPCS | Performed by: SURGERY

## 2018-06-23 PROCEDURE — 99291 CRITICAL CARE FIRST HOUR: CPT | Performed by: SURGERY

## 2018-06-23 PROCEDURE — 85025 COMPLETE CBC W/AUTO DIFF WBC: CPT

## 2018-06-23 PROCEDURE — 700105 HCHG RX REV CODE 258: Performed by: PSYCHIATRY & NEUROLOGY

## 2018-06-23 PROCEDURE — 700111 HCHG RX REV CODE 636 W/ 250 OVERRIDE (IP): Performed by: SURGERY

## 2018-06-23 PROCEDURE — C1751 CATH, INF, PER/CENT/MIDLINE: HCPCS

## 2018-06-23 PROCEDURE — 700101 HCHG RX REV CODE 250: Performed by: SURGERY

## 2018-06-23 PROCEDURE — 700112 HCHG RX REV CODE 229: Performed by: SURGERY

## 2018-06-23 RX ORDER — CALCIUM CHLORIDE 100 MG/ML
1998 INJECTION INTRAVENOUS; INTRAVENTRICULAR ONCE
Status: COMPLETED | OUTPATIENT
Start: 2018-06-23 | End: 2018-06-23

## 2018-06-23 RX ORDER — ALBUMIN, HUMAN INJ 5% 5 %
5000 SOLUTION INTRAVENOUS ONCE
Status: COMPLETED | OUTPATIENT
Start: 2018-06-23 | End: 2018-06-24

## 2018-06-23 RX ADMIN — SODIUM CHLORIDE 2000 MG: 9 INJECTION, SOLUTION INTRAVENOUS at 17:03

## 2018-06-23 RX ADMIN — FAMOTIDINE 20 MG: 20 TABLET ORAL at 08:48

## 2018-06-23 RX ADMIN — SODIUM BICARBONATE 3 ML: 84 INJECTION, SOLUTION INTRAVENOUS at 18:40

## 2018-06-23 RX ADMIN — POTASSIUM BICARBONATE 50 MEQ: 25 TABLET, EFFERVESCENT ORAL at 08:38

## 2018-06-23 RX ADMIN — SODIUM BICARBONATE 3 ML: 84 INJECTION, SOLUTION INTRAVENOUS at 06:44

## 2018-06-23 RX ADMIN — FAMOTIDINE 20 MG: 20 TABLET ORAL at 21:14

## 2018-06-23 RX ADMIN — FUROSEMIDE 40 MG: 10 INJECTION, SOLUTION INTRAMUSCULAR; INTRAVENOUS at 09:03

## 2018-06-23 RX ADMIN — SODIUM BICARBONATE 3 ML: 84 INJECTION, SOLUTION INTRAVENOUS at 02:42

## 2018-06-23 RX ADMIN — ALBUTEROL SULFATE 2.5 MG: 2.5 SOLUTION RESPIRATORY (INHALATION) at 18:40

## 2018-06-23 RX ADMIN — SODIUM CHLORIDE 1000 MG: 9 INJECTION, SOLUTION INTRAVENOUS at 21:18

## 2018-06-23 RX ADMIN — POLYETHYLENE GLYCOL 3350 1 PACKET: 17 POWDER, FOR SOLUTION ORAL at 09:00

## 2018-06-23 RX ADMIN — RIFAXIMIN 550 MG: 550 TABLET ORAL at 09:04

## 2018-06-23 RX ADMIN — ALBUMIN (HUMAN) 250 G: 12.5 INJECTION, SOLUTION INTRAVENOUS at 11:45

## 2018-06-23 RX ADMIN — OXYCODONE HYDROCHLORIDE 5 MG: 5 TABLET ORAL at 04:00

## 2018-06-23 RX ADMIN — ALBUTEROL SULFATE 2.5 MG: 2.5 SOLUTION RESPIRATORY (INHALATION) at 22:10

## 2018-06-23 RX ADMIN — LORAZEPAM 2 MG: 2 INJECTION INTRAMUSCULAR; INTRAVENOUS at 11:19

## 2018-06-23 RX ADMIN — AMLODIPINE BESYLATE 5 MG: 10 TABLET ORAL at 08:45

## 2018-06-23 RX ADMIN — SODIUM BICARBONATE 3 ML: 84 INJECTION, SOLUTION INTRAVENOUS at 10:43

## 2018-06-23 RX ADMIN — SPIRONOLACTONE 50 MG: 50 TABLET ORAL at 06:20

## 2018-06-23 RX ADMIN — MAGNESIUM HYDROXIDE 30 ML: 400 SUSPENSION ORAL at 08:48

## 2018-06-23 RX ADMIN — ALBUTEROL SULFATE 2.5 MG: 2.5 SOLUTION RESPIRATORY (INHALATION) at 13:59

## 2018-06-23 RX ADMIN — OXYCODONE HYDROCHLORIDE 10 MG: 5 TABLET ORAL at 21:14

## 2018-06-23 RX ADMIN — CALCIUM CHLORIDE 2 G: 100 INJECTION, SOLUTION INTRAVENOUS at 11:45

## 2018-06-23 RX ADMIN — ENOXAPARIN SODIUM 30 MG: 100 INJECTION SUBCUTANEOUS at 08:45

## 2018-06-23 RX ADMIN — SODIUM BICARBONATE 3 ML: 84 INJECTION, SOLUTION INTRAVENOUS at 22:10

## 2018-06-23 RX ADMIN — SPIRONOLACTONE 50 MG: 50 TABLET ORAL at 15:37

## 2018-06-23 RX ADMIN — ALBUTEROL SULFATE 2.5 MG: 2.5 SOLUTION RESPIRATORY (INHALATION) at 10:43

## 2018-06-23 RX ADMIN — ALBUTEROL SULFATE 2.5 MG: 2.5 SOLUTION RESPIRATORY (INHALATION) at 02:41

## 2018-06-23 RX ADMIN — DOCUSATE SODIUM 100 MG: 50 LIQUID ORAL at 08:48

## 2018-06-23 RX ADMIN — POTASSIUM BICARBONATE 50 MEQ: 25 TABLET, EFFERVESCENT ORAL at 21:14

## 2018-06-23 RX ADMIN — RIFAXIMIN 550 MG: 550 TABLET ORAL at 21:15

## 2018-06-23 RX ADMIN — ALBUTEROL SULFATE 2.5 MG: 2.5 SOLUTION RESPIRATORY (INHALATION) at 06:44

## 2018-06-23 RX ADMIN — ENOXAPARIN SODIUM 30 MG: 100 INJECTION SUBCUTANEOUS at 21:14

## 2018-06-23 RX ADMIN — SODIUM BICARBONATE 3 ML: 84 INJECTION, SOLUTION INTRAVENOUS at 13:59

## 2018-06-23 ASSESSMENT — ENCOUNTER SYMPTOMS
FALLS: 1
BLOOD IN STOOL: 0
FOCAL WEAKNESS: 1
WEAKNESS: 1
EYE DISCHARGE: 0
HEMOPTYSIS: 0
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
## 2018-06-23 NOTE — PROGRESS NOTES
Plasmapheresis #4 done today, started @ 2113 and ended @ 2241 tolerated well, temp. IJ catheter worked well with only one alarm noted, report given to GERONIMO Musa, see flow sheet for details.

## 2018-06-23 NOTE — CARE PLAN
Problem: Infection  Goal: Will remain free from infection  Outcome: PROGRESSING AS EXPECTED      Problem: Psychosocial Needs:  Goal: Level of anxiety will decrease  Outcome: PROGRESSING AS EXPECTED

## 2018-06-23 NOTE — CARE PLAN
Problem: Ventilation Defect:  Goal: Ability to achieve and maintain unassisted ventilation or tolerate decreased levels of ventilator support    Intervention: Support and monitor invasive and noninvasive mechanical ventilation  Adult Ventilation Update    Total Vent Days: 2    Patient Lines/Drains/Airways Status    Active Airway     Name: Placement date: Placement time: Site: Days:    Airway Group Portex Trach Tracheostomy 8.0 06/07/18   1055   Tracheostomy   17              The patient is currently in Slow wean according to protocol.    (ASV) % MIN VOL: 120 (06/23/18 1415)  ASV Inspiratory Pressures- 15  % Spontaneous -varies    Sputum/Suction   Cough: Productive (06/23/18 1400)  Sputum Amount: Small (06/23/18 1400)  Sputum Color: Yellow (06/23/18 1400)  Sputum Consistency: Thick (06/23/18 1400)    Mobility  Activity Performed: Edge of bed (06/23/18 1500)  Time Activity Tolerated: 10 min (06/23/18 0800)    Events/Summary/Plan: back to asv after 5 hours on t-piece. PT was not desating but said he was tired and did have increased wob. IPV qid, svn q4 (06/23/18 1415)

## 2018-06-23 NOTE — CARE PLAN
Problem: Safety  Goal: Will remain free from injury  Outcome: PROGRESSING AS EXPECTED  Patient monitoring in place for safety
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
## 2018-06-23 NOTE — PROGRESS NOTES
TPE treatment #5 ordered by Dr. Stockton. Initiated treatment at 1145 and ended at 1323 total of 89 minutes treatment. Patient tolerated treatment with no s/s of adverse reaction. Used albumin 5% as replacement fluid. Processed 1 plasma volume total of 5L albumin. Stable post treatment. Locked CVC with ACD. Dressing to RIJ intact.

## 2018-06-23 NOTE — PROGRESS NOTES
Plasmapheresis treatment #4 attempted but [facility] CVC malfunctioned,cant aspirate from both ports now.Intensivist Dr Gregory and Nephrologist Dr Lafleur both notified of the malfunctioned catheter.Per Dr Gregory catheter replacement will take place around midnight and plasmapheresis needs to be done right after.Primary Rn given contact info of Silvia's on call Rn.

## 2018-06-23 NOTE — PROCEDURES
"Central line Op Note    Date of operation: 6/22/2018    Preoperative diagnosis: Guiillan barre  syndrome    Postoperative diagnosis: same    Operation performed: Insertion of right internal jugular triple lumen catheter placement.    Surgeon: Dr. Gregory    Anesthesia: local anesthesia    Indications: The patient is a 63 y.o. male. Placement of a central line was performed in the SICU    Procedure: Following informed consent, the patient was properly identified and optimally positioned in bed. Maximal barrier precautions were employed. A\" time out\" was initiated. The correct patient, procedure, and operative site was verified. The patient's allergy status was assessed. Procedural modifications were made as required.    The right neck was prepped with chlorhexidine and draped into a sterile field  The patient was placed in a shallow Trendelenburg position. The internal jugular vein was accessed percutaneously and a wire advanced without resistance. A previously flushed triple lumen catheter was passed using sterile Selldinger technique and secured to the skin with silk sutures. All of the ports flushed and aspirated freely. The site was cleaned. An antibacterial disk was placed about the catheter exit site and dressed with a transparent sterile occlusive dressing.    The patient tolerated the procedure well. There were no apparent immediate complications. A stat portable chest radiograph was ordered.    "

## 2018-06-23 NOTE — CARE PLAN
Problem: Psychosocial Needs:  Goal: Level of anxiety will decrease  Outcome: PROGRESSING AS EXPECTED  Patient looking anxious. Nursing spent time explaining condition and treatment. Patient now more relaxed.

## 2018-06-23 NOTE — PROGRESS NOTES
Non-tunneled dialysis catheter exchange with Dr Grgeory. Two RN telephone consent with pt's wife, Reshma. TIME OUT performed at 1845.   Pt's Vital signs stable throughout the procedure.

## 2018-06-23 NOTE — CARE PLAN
Problem: Ventilation Defect:  Goal: Ability to achieve and maintain unassisted ventilation or tolerate decreased levels of ventilator support    Intervention: Support and monitor invasive and noninvasive mechanical ventilation  Adult Ventilation Update    Total Vent Days: 22    Patient Lines/Drains/Airways Status    Active Airway     Name: Placement date: Placement time: Site: Days:    Airway Group Portex Trach Tracheostomy 8.0 06/07/18   1055   Tracheostomy   16              Plateau Pressure (Q Shift):  (unable to obtain) (06/21/18 0325)  Static Compliance (ml / cm H2O): 56 (06/23/18 0242)    16 day post trache.     Sputum/Suction yes  Cough: Productive (06/23/18 0242)  Sputum Amount: Moderate (06/23/18 0242)  Sputum Color: Yellow (06/23/18 0242)  Sputum Consistency: Thin;Thick (06/23/18 0242)    Events/Summary/Plan: Pt stable on vent, no changes made during this shift.      Problem: Bronchoconstriction:  Goal: Improve in air movement and diminished wheezing  Outcome: PROGRESSING AS EXPECTED  Pt is on duoneb Q4.

## 2018-06-24 ENCOUNTER — APPOINTMENT (OUTPATIENT)
Dept: RADIOLOGY | Facility: MEDICAL CENTER | Age: 63
DRG: 003 | End: 2018-06-24
Attending: PSYCHIATRY & NEUROLOGY
Payer: COMMERCIAL

## 2018-06-24 ENCOUNTER — APPOINTMENT (OUTPATIENT)
Dept: RADIOLOGY | Facility: MEDICAL CENTER | Age: 63
DRG: 003 | End: 2018-06-24
Attending: SURGERY
Payer: COMMERCIAL

## 2018-06-24 LAB
ANION GAP SERPL CALC-SCNC: 6 MMOL/L (ref 0–11.9)
BASOPHILS # BLD AUTO: 0.2 % (ref 0–1.8)
BASOPHILS # BLD: 0.02 K/UL (ref 0–0.12)
BUN SERPL-MCNC: 43 MG/DL (ref 8–22)
CALCIUM SERPL-MCNC: 8.8 MG/DL (ref 8.5–10.5)
CHLORIDE SERPL-SCNC: 108 MMOL/L (ref 96–112)
CO2 SERPL-SCNC: 27 MMOL/L (ref 20–33)
CREAT SERPL-MCNC: 0.87 MG/DL (ref 0.5–1.4)
EOSINOPHIL # BLD AUTO: 0.39 K/UL (ref 0–0.51)
EOSINOPHIL NFR BLD: 3.8 % (ref 0–6.9)
ERYTHROCYTE [DISTWIDTH] IN BLOOD BY AUTOMATED COUNT: 52.5 FL (ref 35.9–50)
GLUCOSE SERPL-MCNC: 117 MG/DL (ref 65–99)
GRAM STN SPEC: NORMAL
HCT VFR BLD AUTO: 29.8 % (ref 42–52)
HGB BLD-MCNC: 9.4 G/DL (ref 14–18)
IMM GRANULOCYTES # BLD AUTO: 0.05 K/UL (ref 0–0.11)
IMM GRANULOCYTES NFR BLD AUTO: 0.5 % (ref 0–0.9)
LYMPHOCYTES # BLD AUTO: 1.7 K/UL (ref 1–4.8)
LYMPHOCYTES NFR BLD: 16.5 % (ref 22–41)
MCH RBC QN AUTO: 30.6 PG (ref 27–33)
MCHC RBC AUTO-ENTMCNC: 31.5 G/DL (ref 33.7–35.3)
MCV RBC AUTO: 97.1 FL (ref 81.4–97.8)
MONOCYTES # BLD AUTO: 0.13 K/UL (ref 0–0.85)
MONOCYTES NFR BLD AUTO: 1.3 % (ref 0–13.4)
NEUTROPHILS # BLD AUTO: 8.01 K/UL (ref 1.82–7.42)
NEUTROPHILS NFR BLD: 77.7 % (ref 44–72)
NRBC # BLD AUTO: 0 K/UL
NRBC BLD-RTO: 0 /100 WBC
PLATELET # BLD AUTO: 369 K/UL (ref 164–446)
PMV BLD AUTO: 11.6 FL (ref 9–12.9)
POTASSIUM SERPL-SCNC: 5.1 MMOL/L (ref 3.6–5.5)
RBC # BLD AUTO: 3.07 M/UL (ref 4.7–6.1)
SIGNIFICANT IND 70042: NORMAL
SITE SITE: NORMAL
SODIUM SERPL-SCNC: 141 MMOL/L (ref 135–145)
SOURCE SOURCE: NORMAL
WBC # BLD AUTO: 10.3 K/UL (ref 4.8–10.8)

## 2018-06-24 PROCEDURE — A9270 NON-COVERED ITEM OR SERVICE: HCPCS | Performed by: SURGERY

## 2018-06-24 PROCEDURE — 94640 AIRWAY INHALATION TREATMENT: CPT

## 2018-06-24 PROCEDURE — 700101 HCHG RX REV CODE 250: Performed by: SURGERY

## 2018-06-24 PROCEDURE — 700102 HCHG RX REV CODE 250 W/ 637 OVERRIDE(OP): Performed by: SURGERY

## 2018-06-24 PROCEDURE — 99291 CRITICAL CARE FIRST HOUR: CPT | Mod: 25 | Performed by: SURGERY

## 2018-06-24 PROCEDURE — 700105 HCHG RX REV CODE 258: Performed by: SURGERY

## 2018-06-24 PROCEDURE — 94669 MECHANICAL CHEST WALL OSCILL: CPT

## 2018-06-24 PROCEDURE — 770022 HCHG ROOM/CARE - ICU (200)

## 2018-06-24 PROCEDURE — 87070 CULTURE OTHR SPECIMN AEROBIC: CPT

## 2018-06-24 PROCEDURE — 87186 SC STD MICRODIL/AGAR DIL: CPT

## 2018-06-24 PROCEDURE — 87040 BLOOD CULTURE FOR BACTERIA: CPT | Mod: 91

## 2018-06-24 PROCEDURE — 700111 HCHG RX REV CODE 636 W/ 250 OVERRIDE (IP): Performed by: SURGERY

## 2018-06-24 PROCEDURE — 87077 CULTURE AEROBIC IDENTIFY: CPT

## 2018-06-24 PROCEDURE — 94003 VENT MGMT INPAT SUBQ DAY: CPT

## 2018-06-24 PROCEDURE — 700111 HCHG RX REV CODE 636 W/ 250 OVERRIDE (IP): Performed by: INTERNAL MEDICINE

## 2018-06-24 PROCEDURE — 700105 HCHG RX REV CODE 258: Performed by: PSYCHIATRY & NEUROLOGY

## 2018-06-24 PROCEDURE — 80048 BASIC METABOLIC PNL TOTAL CA: CPT

## 2018-06-24 PROCEDURE — 85025 COMPLETE CBC W/AUTO DIFF WBC: CPT

## 2018-06-24 PROCEDURE — 302978 HCHG BRONCHOSCOPY-DIAGNOSTIC

## 2018-06-24 PROCEDURE — 700111 HCHG RX REV CODE 636 W/ 250 OVERRIDE (IP): Performed by: PSYCHIATRY & NEUROLOGY

## 2018-06-24 PROCEDURE — 31624 DX BRONCHOSCOPE/LAVAGE: CPT | Performed by: SURGERY

## 2018-06-24 PROCEDURE — 87205 SMEAR GRAM STAIN: CPT

## 2018-06-24 PROCEDURE — 700112 HCHG RX REV CODE 229: Performed by: SURGERY

## 2018-06-24 PROCEDURE — 302977 HCHG BRONCHOSCOPY PROC-THERAPEUTIC

## 2018-06-24 PROCEDURE — 0B9M8ZX DRAINAGE OF BILATERAL LUNGS, VIA NATURAL OR ARTIFICIAL OPENING ENDOSCOPIC, DIAGNOSTIC: ICD-10-PCS | Performed by: SURGERY

## 2018-06-24 PROCEDURE — 71045 X-RAY EXAM CHEST 1 VIEW: CPT

## 2018-06-24 RX ORDER — ACETAMINOPHEN 325 MG/1
650 TABLET ORAL ONCE
Status: COMPLETED | OUTPATIENT
Start: 2018-06-24 | End: 2018-06-24

## 2018-06-24 RX ADMIN — POLYETHYLENE GLYCOL 3350 1 PACKET: 17 POWDER, FOR SOLUTION ORAL at 20:50

## 2018-06-24 RX ADMIN — RIFAXIMIN 550 MG: 550 TABLET ORAL at 08:34

## 2018-06-24 RX ADMIN — LORAZEPAM 2 MG: 2 INJECTION INTRAMUSCULAR; INTRAVENOUS at 10:28

## 2018-06-24 RX ADMIN — SPIRONOLACTONE 50 MG: 50 TABLET ORAL at 16:52

## 2018-06-24 RX ADMIN — CEFEPIME 2 G: 2 INJECTION, POWDER, FOR SOLUTION INTRAMUSCULAR; INTRAVENOUS at 21:12

## 2018-06-24 RX ADMIN — SODIUM BICARBONATE 3 ML: 84 INJECTION, SOLUTION INTRAVENOUS at 19:53

## 2018-06-24 RX ADMIN — DOCUSATE SODIUM 100 MG: 50 LIQUID ORAL at 20:50

## 2018-06-24 RX ADMIN — FAMOTIDINE 20 MG: 20 TABLET ORAL at 20:51

## 2018-06-24 RX ADMIN — OXYCODONE HYDROCHLORIDE 10 MG: 5 TABLET ORAL at 06:01

## 2018-06-24 RX ADMIN — SODIUM BICARBONATE 3 ML: 84 INJECTION, SOLUTION INTRAVENOUS at 10:17

## 2018-06-24 RX ADMIN — SODIUM CHLORIDE 1000 MG: 9 INJECTION, SOLUTION INTRAVENOUS at 13:51

## 2018-06-24 RX ADMIN — AMLODIPINE BESYLATE 5 MG: 10 TABLET ORAL at 08:33

## 2018-06-24 RX ADMIN — SODIUM BICARBONATE 3 ML: 84 INJECTION, SOLUTION INTRAVENOUS at 06:38

## 2018-06-24 RX ADMIN — FUROSEMIDE 40 MG: 10 INJECTION, SOLUTION INTRAMUSCULAR; INTRAVENOUS at 08:34

## 2018-06-24 RX ADMIN — ALBUTEROL SULFATE 2.5 MG: 2.5 SOLUTION RESPIRATORY (INHALATION) at 02:22

## 2018-06-24 RX ADMIN — DOCUSATE SODIUM 100 MG: 50 LIQUID ORAL at 08:33

## 2018-06-24 RX ADMIN — FAMOTIDINE 20 MG: 20 TABLET ORAL at 08:33

## 2018-06-24 RX ADMIN — RIFAXIMIN 550 MG: 550 TABLET ORAL at 20:51

## 2018-06-24 RX ADMIN — ALBUTEROL SULFATE 2.5 MG: 2.5 SOLUTION RESPIRATORY (INHALATION) at 14:35

## 2018-06-24 RX ADMIN — ALBUTEROL SULFATE 2.5 MG: 2.5 SOLUTION RESPIRATORY (INHALATION) at 23:07

## 2018-06-24 RX ADMIN — PROPRANOLOL HYDROCHLORIDE 10 MG: 10 TABLET ORAL at 06:02

## 2018-06-24 RX ADMIN — ALBUTEROL SULFATE 2.5 MG: 2.5 SOLUTION RESPIRATORY (INHALATION) at 10:17

## 2018-06-24 RX ADMIN — CEFEPIME 2 G: 2 INJECTION, POWDER, FOR SOLUTION INTRAMUSCULAR; INTRAVENOUS at 13:51

## 2018-06-24 RX ADMIN — ENOXAPARIN SODIUM 30 MG: 100 INJECTION SUBCUTANEOUS at 20:51

## 2018-06-24 RX ADMIN — SENNOSIDES AND DOCUSATE SODIUM 1 TABLET: 8.6; 5 TABLET ORAL at 20:50

## 2018-06-24 RX ADMIN — ALBUTEROL SULFATE 2.5 MG: 2.5 SOLUTION RESPIRATORY (INHALATION) at 06:38

## 2018-06-24 RX ADMIN — PROPRANOLOL HYDROCHLORIDE 10 MG: 10 TABLET ORAL at 13:51

## 2018-06-24 RX ADMIN — ENOXAPARIN SODIUM 30 MG: 100 INJECTION SUBCUTANEOUS at 08:33

## 2018-06-24 RX ADMIN — ACETAMINOPHEN 650 MG: 325 TABLET, FILM COATED ORAL at 06:28

## 2018-06-24 RX ADMIN — SODIUM BICARBONATE 3 ML: 84 INJECTION, SOLUTION INTRAVENOUS at 02:22

## 2018-06-24 RX ADMIN — LORAZEPAM 2 MG: 2 INJECTION INTRAMUSCULAR; INTRAVENOUS at 15:37

## 2018-06-24 RX ADMIN — SODIUM CHLORIDE 1000 MG: 9 INJECTION, SOLUTION INTRAVENOUS at 20:51

## 2018-06-24 RX ADMIN — VANCOMYCIN HYDROCHLORIDE 2600 MG: 100 INJECTION, POWDER, LYOPHILIZED, FOR SOLUTION INTRAVENOUS at 11:16

## 2018-06-24 RX ADMIN — SODIUM BICARBONATE 3 ML: 84 INJECTION, SOLUTION INTRAVENOUS at 23:07

## 2018-06-24 RX ADMIN — OXYCODONE HYDROCHLORIDE 10 MG: 5 TABLET ORAL at 20:52

## 2018-06-24 RX ADMIN — SODIUM BICARBONATE 3 ML: 84 INJECTION, SOLUTION INTRAVENOUS at 14:35

## 2018-06-24 RX ADMIN — LORAZEPAM 2 MG: 2 INJECTION INTRAMUSCULAR; INTRAVENOUS at 03:30

## 2018-06-24 RX ADMIN — SPIRONOLACTONE 50 MG: 50 TABLET ORAL at 06:02

## 2018-06-24 RX ADMIN — SODIUM CHLORIDE 1000 MG: 9 INJECTION, SOLUTION INTRAVENOUS at 06:02

## 2018-06-24 RX ADMIN — ALBUTEROL SULFATE 2.5 MG: 2.5 SOLUTION RESPIRATORY (INHALATION) at 19:49

## 2018-06-24 ASSESSMENT — ENCOUNTER SYMPTOMS
HEMOPTYSIS: 0
BLOOD IN STOOL: 0
WEAKNESS: 1
FOCAL WEAKNESS: 1
EYE DISCHARGE: 0
FALLS: 1

## 2018-06-24 NOTE — CARE PLAN
Problem: Safety  Goal: Will remain free from injury  Outcome: PROGRESSING AS EXPECTED      Problem: Infection  Goal: Will remain free from infection  Outcome: PROGRESSING SLOWER THAN EXPECTED
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
## 2018-06-24 NOTE — CARE PLAN
Problem: Ventilation Defect:  Goal: Ability to achieve and maintain unassisted ventilation or tolerate decreased levels of ventilator support  Outcome: PROGRESSING SLOWER THAN EXPECTED  Adult Ventilation Update    Total Vent Days: 3    Patient Lines/Drains/Airways Status    Active Airway     Name: Placement date: Placement time: Site: Days:    Airway Group Portex Trach Tracheostomy 8.0 06/07/18   1055   Tracheostomy   18              T piece during dayshift. Vent rest during nightshift.       (ASV) % MIN VOL: 120 (06/24/18 0226)  ASV Inspiratory Pressures 9  % Spontaneous 100    Sputum/Suction   Cough: Productive (06/24/18 0226)  Sputum Amount: Small (06/24/18 0226)  Sputum Color: Tan (06/24/18 0226)  Sputum Consistency: Thick (06/24/18 0226)    Mobility  Level of Mobility: Level III (06/23/18 2300)  Activity Performed: Edge of bed (06/23/18 2300)  Time Activity Tolerated: 5 min (06/23/18 2300)  Distance Per Occurrence (ft.): 0 feet (06/22/18 1400)  # of Times Distance was Traveled: 0 (06/22/18 1400)  Assistance / Tolerance: Assistance of Two or More;Tires quickly;General Weakness (06/23/18 2300)  Pt Calls for Assistance: No (06/23/18 0800)  Staff Present for Mobilization: RN (06/23/18 2300)  Gait: Unable to Ambulate (06/22/18 0400)  Assistive Devices: Hand held assist (06/23/18 2300)  Reason Not Mobilized: Other (comment) (not enough staff present to hep with mobility) (06/19/18 0600)  Mobilization Comments: High vent settings.  (06/13/18 2000)    Events/Summary/Plan:No changes overnight. Pt resting on ventilator.

## 2018-06-24 NOTE — PROGRESS NOTES
Pt came down for MRI he was unable to hold still images had to much motion artifact to send to radiologist. Max meds given per RN.

## 2018-06-24 NOTE — PROGRESS NOTES
Paged Dr. Leung to ensure OK to pull HD catheter. No plans for additional plasmapheresis treatments. OK to pull line.
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
## 2018-06-24 NOTE — PROGRESS NOTES
Patient to MRI at 1500 on transport monitor with ACLS RN, transport, and RT. 02 via trach collar. Patient did not tolerate procedure well. Anxious and thrashing head throughout. PRN Ativan administered for agitation. 02 sat 88%. Back to S-108 at this time and placed on vent by RT.

## 2018-06-24 NOTE — PROGRESS NOTES
Bronchoscopy performed at bedside by Dr. Gregory. Time-out performed. Verified consent obtained and placed on chart. VSS throughout. Patient tolerated procedure well.
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
## 2018-06-24 NOTE — PROCEDURES
DATE OF OPERATION: 6/24/2018     PREOPERATIVE DIAGNOSIS: purulent secretions and fever     POSTOPERATIVE DIAGNOSIS: purulent secretions and fever     PROCEDURE PERFORMED: Fiberoptic bronchoscopy with bronchoalveolar lavage    SURGEON: Ambrocio Gregory M.D.    ANESTHESIA: Intravenous sedation, analgesia, and pharmacologic restraint     INDICATIONS: The patient is a 63 y.o. male with acute respiratory failure.     FINDINGS: Purulent secretions  RUL, RML, RLL, SANIA and LLL    SPECIMEN: Bronchoalveolar lavage for quantitative cultures    PROCEDURE: Following informed consent, the patient was properly identified and optimally positioned in bed. He was preoxygenated with 100% oxygen and placed on a regular ventilatory rate. Intravenous sedation, analgesia, and pharmacologic restraint was administered.    The fiberoptic bronchoscope was advance through the indwelling endotracheal tube.  The upper airways were suctioned. The airways were systematically and sequentially inspected and lavaged. The pooled effluent was collected in a sterile trap and submitted for quantitative cultures.     The patient tolerated the procedure well. There were no apparent complications.    ____________________________________   Ambrocio Gregory M.D.    DD: 6/24/2018  12:23 PM

## 2018-06-24 NOTE — PROGRESS NOTES
Upon assessment, patient seemed to exhibit panic attack like symptoms. Patient would not respond to any verbal stimulus and was unable to follow any commands. Patient was tachycardiac, tachypnea, hypertensive and febrile.     Dr. Gregory notified and new orders received. RN will continue to monitor patient closely.
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
## 2018-06-24 NOTE — RESPIRATORY CARE
Pt has been on tpiece all day, 10L 40%. #8 portex with spare trach at bedside. IPV WID, alb & HCO3 neb Q4. Sxn large thick yellow. Bronch done at bedside and sputum sample collected. Will t-piece as ryan, vent on standby.

## 2018-06-25 ENCOUNTER — APPOINTMENT (OUTPATIENT)
Dept: RADIOLOGY | Facility: MEDICAL CENTER | Age: 63
DRG: 003 | End: 2018-06-25
Attending: PSYCHIATRY & NEUROLOGY
Payer: COMMERCIAL

## 2018-06-25 ENCOUNTER — APPOINTMENT (OUTPATIENT)
Dept: RADIOLOGY | Facility: MEDICAL CENTER | Age: 63
DRG: 003 | End: 2018-06-25
Attending: SURGERY
Payer: COMMERCIAL

## 2018-06-25 LAB
ALBUMIN SERPL BCP-MCNC: 3.9 G/DL (ref 3.2–4.9)
ALBUMIN/GLOB SERPL: 1.9 G/DL
ALP SERPL-CCNC: 66 U/L (ref 30–99)
ALT SERPL-CCNC: 11 U/L (ref 2–50)
ANION GAP SERPL CALC-SCNC: 8 MMOL/L (ref 0–11.9)
AST SERPL-CCNC: 17 U/L (ref 12–45)
BASOPHILS # BLD AUTO: 0.3 % (ref 0–1.8)
BASOPHILS # BLD: 0.04 K/UL (ref 0–0.12)
BILIRUB SERPL-MCNC: 0.6 MG/DL (ref 0.1–1.5)
BUN SERPL-MCNC: 49 MG/DL (ref 8–22)
CALCIUM SERPL-MCNC: 8.6 MG/DL (ref 8.5–10.5)
CHLORIDE SERPL-SCNC: 106 MMOL/L (ref 96–112)
CO2 SERPL-SCNC: 26 MMOL/L (ref 20–33)
CREAT SERPL-MCNC: 0.86 MG/DL (ref 0.5–1.4)
CRP SERPL HS-MCNC: 7.73 MG/DL (ref 0–0.75)
EOSINOPHIL # BLD AUTO: 0.46 K/UL (ref 0–0.51)
EOSINOPHIL NFR BLD: 3.4 % (ref 0–6.9)
ERYTHROCYTE [DISTWIDTH] IN BLOOD BY AUTOMATED COUNT: 51.8 FL (ref 35.9–50)
GLOBULIN SER CALC-MCNC: 2.1 G/DL (ref 1.9–3.5)
GLUCOSE SERPL-MCNC: 118 MG/DL (ref 65–99)
HCT VFR BLD AUTO: 25.8 % (ref 42–52)
HGB BLD-MCNC: 8.1 G/DL (ref 14–18)
IMM GRANULOCYTES # BLD AUTO: 0.09 K/UL (ref 0–0.11)
IMM GRANULOCYTES NFR BLD AUTO: 0.7 % (ref 0–0.9)
LYMPHOCYTES # BLD AUTO: 2.57 K/UL (ref 1–4.8)
LYMPHOCYTES NFR BLD: 19.2 % (ref 22–41)
MAGNESIUM SERPL-MCNC: 2.5 MG/DL (ref 1.5–2.5)
MCH RBC QN AUTO: 30.7 PG (ref 27–33)
MCHC RBC AUTO-ENTMCNC: 31.4 G/DL (ref 33.7–35.3)
MCV RBC AUTO: 97.7 FL (ref 81.4–97.8)
MONOCYTES # BLD AUTO: 1.21 K/UL (ref 0–0.85)
MONOCYTES NFR BLD AUTO: 9.1 % (ref 0–13.4)
NEUTROPHILS # BLD AUTO: 9 K/UL (ref 1.82–7.42)
NEUTROPHILS NFR BLD: 67.3 % (ref 44–72)
NRBC # BLD AUTO: 0 K/UL
NRBC BLD-RTO: 0 /100 WBC
PHOSPHATE SERPL-MCNC: 3.8 MG/DL (ref 2.5–4.5)
PLATELET # BLD AUTO: 330 K/UL (ref 164–446)
PMV BLD AUTO: 12 FL (ref 9–12.9)
POTASSIUM SERPL-SCNC: 4.2 MMOL/L (ref 3.6–5.5)
PREALB SERPL-MCNC: 15 MG/DL (ref 18–38)
PROT SERPL-MCNC: 6 G/DL (ref 6–8.2)
RBC # BLD AUTO: 2.64 M/UL (ref 4.7–6.1)
SODIUM SERPL-SCNC: 140 MMOL/L (ref 135–145)
WBC # BLD AUTO: 13.4 K/UL (ref 4.8–10.8)

## 2018-06-25 PROCEDURE — 84100 ASSAY OF PHOSPHORUS: CPT

## 2018-06-25 PROCEDURE — 700102 HCHG RX REV CODE 250 W/ 637 OVERRIDE(OP): Performed by: SURGERY

## 2018-06-25 PROCEDURE — A9270 NON-COVERED ITEM OR SERVICE: HCPCS | Performed by: SURGERY

## 2018-06-25 PROCEDURE — 71045 X-RAY EXAM CHEST 1 VIEW: CPT

## 2018-06-25 PROCEDURE — 94640 AIRWAY INHALATION TREATMENT: CPT

## 2018-06-25 PROCEDURE — 700111 HCHG RX REV CODE 636 W/ 250 OVERRIDE (IP): Performed by: INTERNAL MEDICINE

## 2018-06-25 PROCEDURE — 700105 HCHG RX REV CODE 258: Performed by: SURGERY

## 2018-06-25 PROCEDURE — 83516 IMMUNOASSAY NONANTIBODY: CPT | Mod: 91

## 2018-06-25 PROCEDURE — 700105 HCHG RX REV CODE 258: Performed by: PSYCHIATRY & NEUROLOGY

## 2018-06-25 PROCEDURE — 95951 EEG: CPT | Mod: 52

## 2018-06-25 PROCEDURE — 86140 C-REACTIVE PROTEIN: CPT

## 2018-06-25 PROCEDURE — 94669 MECHANICAL CHEST WALL OSCILL: CPT

## 2018-06-25 PROCEDURE — 70544 MR ANGIOGRAPHY HEAD W/O DYE: CPT

## 2018-06-25 PROCEDURE — 80053 COMPREHEN METABOLIC PANEL: CPT

## 2018-06-25 PROCEDURE — 83735 ASSAY OF MAGNESIUM: CPT

## 2018-06-25 PROCEDURE — 70551 MRI BRAIN STEM W/O DYE: CPT

## 2018-06-25 PROCEDURE — 700112 HCHG RX REV CODE 229: Performed by: SURGERY

## 2018-06-25 PROCEDURE — 700111 HCHG RX REV CODE 636 W/ 250 OVERRIDE (IP): Performed by: SURGERY

## 2018-06-25 PROCEDURE — 700111 HCHG RX REV CODE 636 W/ 250 OVERRIDE (IP): Performed by: PSYCHIATRY & NEUROLOGY

## 2018-06-25 PROCEDURE — 70547 MR ANGIOGRAPHY NECK W/O DYE: CPT

## 2018-06-25 PROCEDURE — 770022 HCHG ROOM/CARE - ICU (200)

## 2018-06-25 PROCEDURE — 85025 COMPLETE CBC W/AUTO DIFF WBC: CPT

## 2018-06-25 PROCEDURE — 700101 HCHG RX REV CODE 250: Performed by: SURGERY

## 2018-06-25 PROCEDURE — 92526 ORAL FUNCTION THERAPY: CPT

## 2018-06-25 PROCEDURE — 94003 VENT MGMT INPAT SUBQ DAY: CPT

## 2018-06-25 PROCEDURE — 84134 ASSAY OF PREALBUMIN: CPT

## 2018-06-25 PROCEDURE — 92507 TX SP LANG VOICE COMM INDIV: CPT

## 2018-06-25 PROCEDURE — 99291 CRITICAL CARE FIRST HOUR: CPT | Performed by: SURGERY

## 2018-06-25 RX ORDER — MIDAZOLAM HYDROCHLORIDE 1 MG/ML
1-5 INJECTION INTRAMUSCULAR; INTRAVENOUS ONCE
Status: COMPLETED | OUTPATIENT
Start: 2018-06-25 | End: 2018-06-25

## 2018-06-25 RX ORDER — VECURONIUM BROMIDE 1 MG/ML
10 INJECTION, POWDER, LYOPHILIZED, FOR SOLUTION INTRAVENOUS ONCE
Status: COMPLETED | OUTPATIENT
Start: 2018-06-25 | End: 2018-06-25

## 2018-06-25 RX ADMIN — VECURONIUM BROMIDE 10 MG: 10 INJECTION, POWDER, LYOPHILIZED, FOR SOLUTION INTRAVENOUS at 11:30

## 2018-06-25 RX ADMIN — ALBUTEROL SULFATE 2.5 MG: 2.5 SOLUTION RESPIRATORY (INHALATION) at 19:51

## 2018-06-25 RX ADMIN — MIDAZOLAM 5 MG: 1 INJECTION INTRAMUSCULAR; INTRAVENOUS at 11:25

## 2018-06-25 RX ADMIN — AMLODIPINE BESYLATE 5 MG: 10 TABLET ORAL at 09:07

## 2018-06-25 RX ADMIN — SODIUM CHLORIDE 1000 MG: 9 INJECTION, SOLUTION INTRAVENOUS at 06:05

## 2018-06-25 RX ADMIN — SODIUM BICARBONATE 3 ML: 84 INJECTION, SOLUTION INTRAVENOUS at 07:15

## 2018-06-25 RX ADMIN — ALBUTEROL SULFATE 2.5 MG: 2.5 SOLUTION RESPIRATORY (INHALATION) at 02:41

## 2018-06-25 RX ADMIN — DOCUSATE SODIUM 100 MG: 50 LIQUID ORAL at 20:00

## 2018-06-25 RX ADMIN — RIFAXIMIN 550 MG: 550 TABLET ORAL at 09:08

## 2018-06-25 RX ADMIN — ALBUTEROL SULFATE 2.5 MG: 2.5 SOLUTION RESPIRATORY (INHALATION) at 07:17

## 2018-06-25 RX ADMIN — CEFEPIME 2 G: 2 INJECTION, POWDER, FOR SOLUTION INTRAMUSCULAR; INTRAVENOUS at 15:01

## 2018-06-25 RX ADMIN — OXYCODONE HYDROCHLORIDE 10 MG: 5 TABLET ORAL at 21:50

## 2018-06-25 RX ADMIN — FUROSEMIDE 40 MG: 10 INJECTION, SOLUTION INTRAMUSCULAR; INTRAVENOUS at 09:07

## 2018-06-25 RX ADMIN — POLYETHYLENE GLYCOL 3350 1 PACKET: 17 POWDER, FOR SOLUTION ORAL at 09:07

## 2018-06-25 RX ADMIN — PROPRANOLOL HYDROCHLORIDE 10 MG: 10 TABLET ORAL at 14:35

## 2018-06-25 RX ADMIN — SODIUM CHLORIDE 1000 MG: 9 INJECTION, SOLUTION INTRAVENOUS at 14:29

## 2018-06-25 RX ADMIN — ENOXAPARIN SODIUM 30 MG: 100 INJECTION SUBCUTANEOUS at 09:07

## 2018-06-25 RX ADMIN — PROPRANOLOL HYDROCHLORIDE 10 MG: 10 TABLET ORAL at 06:08

## 2018-06-25 RX ADMIN — DOCUSATE SODIUM 100 MG: 50 LIQUID ORAL at 09:07

## 2018-06-25 RX ADMIN — VANCOMYCIN HYDROCHLORIDE 2100 MG: 100 INJECTION, POWDER, LYOPHILIZED, FOR SOLUTION INTRAVENOUS at 10:54

## 2018-06-25 RX ADMIN — SODIUM CHLORIDE 1000 MG: 9 INJECTION, SOLUTION INTRAVENOUS at 23:00

## 2018-06-25 RX ADMIN — SODIUM BICARBONATE 3 ML: 84 INJECTION, SOLUTION INTRAVENOUS at 19:51

## 2018-06-25 RX ADMIN — FAMOTIDINE 20 MG: 20 TABLET ORAL at 09:07

## 2018-06-25 RX ADMIN — SODIUM BICARBONATE 3 ML: 84 INJECTION, SOLUTION INTRAVENOUS at 02:41

## 2018-06-25 RX ADMIN — ALBUTEROL SULFATE 2.5 MG: 2.5 SOLUTION RESPIRATORY (INHALATION) at 22:54

## 2018-06-25 RX ADMIN — FAMOTIDINE 20 MG: 20 TABLET ORAL at 20:00

## 2018-06-25 RX ADMIN — CEFEPIME 2 G: 2 INJECTION, POWDER, FOR SOLUTION INTRAVENOUS at 21:52

## 2018-06-25 RX ADMIN — SPIRONOLACTONE 50 MG: 50 TABLET ORAL at 06:08

## 2018-06-25 RX ADMIN — CEFEPIME 2 G: 2 INJECTION, POWDER, FOR SOLUTION INTRAMUSCULAR; INTRAVENOUS at 05:16

## 2018-06-25 RX ADMIN — MAGNESIUM HYDROXIDE 30 ML: 400 SUSPENSION ORAL at 09:07

## 2018-06-25 RX ADMIN — OXYCODONE HYDROCHLORIDE 10 MG: 5 TABLET ORAL at 02:11

## 2018-06-25 RX ADMIN — ENOXAPARIN SODIUM 30 MG: 100 INJECTION SUBCUTANEOUS at 20:00

## 2018-06-25 RX ADMIN — PROPRANOLOL HYDROCHLORIDE 10 MG: 10 TABLET ORAL at 21:50

## 2018-06-25 RX ADMIN — ALBUTEROL SULFATE 2.5 MG: 2.5 SOLUTION RESPIRATORY (INHALATION) at 15:11

## 2018-06-25 RX ADMIN — SODIUM BICARBONATE 3 ML: 84 INJECTION, SOLUTION INTRAVENOUS at 15:11

## 2018-06-25 RX ADMIN — SODIUM BICARBONATE 3 ML: 84 INJECTION, SOLUTION INTRAVENOUS at 22:54

## 2018-06-25 RX ADMIN — SPIRONOLACTONE 50 MG: 50 TABLET ORAL at 15:59

## 2018-06-25 RX ADMIN — RIFAXIMIN 550 MG: 550 TABLET ORAL at 20:00

## 2018-06-25 NOTE — CARE PLAN
Problem: Bowel/Gastric:  Goal: Will not experience complications related to bowel motility  Outcome: PROGRESSING AS EXPECTED      Problem: Skin Integrity  Goal: Risk for impaired skin integrity will decrease  Outcome: PROGRESSING SLOWER THAN EXPECTED

## 2018-06-25 NOTE — THERAPY
PT treatment attempted. Pt undergoing EEG and not following commands. Not appropriate for PT today. Will follow up and treat as indicated.
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
## 2018-06-25 NOTE — PROCEDURES
DATE OF SERVICE:  06/25/2018    This is inpatient EEG done on 06/25/2018.    ROUTINE VIDEO ELECTROENCEPHALOGRAM REPORT        NAME: Devonte Sotelo     REFERRING Dr: UTE     DURATION: 28 minutes     INDICATION: Change Of Mental Status        TECHNIQUE: 30 channel routine electroencephalogram (EEG) was performed in accordance with the international 10-20 system. The study was reviewed in bipolar and referential montages. The recording examined the patient during wakeful and drowsy state(s).      DESCRIPTION OF THE RECORD:        Background rhythm during awake stage shows average voltage 3 to 5 hertz activity in the posterior regions. No spike-and-wave discharges or any lateralizing abnormalities are seen. There are persistent poly and monomorphic delta slow Stage I sleep was achieved.        ACTIVATION PROCEDURES:       Hyperventilation and Photic Stimulation were not done     ICTAL AND/OR INTERICTAL FINDINGS:    No clinical events or seizures were reported or recorded during the study.       EKG: sampling of the EKG recording demonstrated sinus rhythm.          INTERPRETATION:           ________________________________________________________________________     This is abnormal routine video EEG recording in the awake and drowsy/sleep state(s).     This scalp EEG denotes                  1. Diffuse nonspecific cortical dysfunction - moderate to severe     This nonspecific abnormalities could be secondary to metabolic, toxic, polypharmacy or even post-ictal     EEG 06/25/18 4:08 PM     ________________________________________________________________________                  ____________________________________     MD DOROTHY TERAN / KIKA    DD:  06/25/2018 16:05:35  DT:  06/25/2018 16:18:41    D#:  [id number]  Job#:  [id number]

## 2018-06-25 NOTE — CARE PLAN
Problem: Bronchoconstriction:  Goal: Improve in air movement and diminished wheezing  Outcome: PROGRESSING AS EXPECTED  Albuterol Q4 hours

## 2018-06-25 NOTE — PROGRESS NOTES
S- ROCA occasional to command per wife, occasional mental clarity, improved soon after PLEX.  No other complaints.     O-   Allergies:   Allergies   Allergen Reactions   • Lyrica Unspecified     Chest pain         Current Facility-Administered Medications:   •  MD ALERT... vancomycin per pharmacy protocol, , Other, pharmacy to dose, Ambrocio Gregory M.D.  •  cefepime (MAXIPIME) 2 g in  mL IVPB, 2 g, Intravenous, Q8HRS, Ambrocio Gregory M.D., Stopped at 06/25/18 0546  •  vancomycin 2,100 mg in  mL IVPB, 20 mg/kg, Intravenous, Q24HR, Ambrocio Gregory M.D.  •  levETIRAcetam (KEPPRA) 1,000 mg in  mL IVPB, 1,000 mg, Intravenous, Q8HRS, Francois Leung M.D., Stopped at 06/25/18 0620  •  albuterol (PROVENTIL) 2.5mg/0.5ml nebulizer solution 2.5 mg, 2.5 mg, Nebulization, Q4HRS (RT), Ambrocio Gregory M.D., 2.5 mg at 06/25/18 0717  •  LORazepam (ATIVAN) injection 2 mg, 2 mg, Intravenous, Q4HRS PRN, Ambrocio Gregory M.D., 2 mg at 06/24/18 1537  •  sodium bicarbonate 8.4 % injection 3 mL, 3 mL, Inhalation, Q4HRS (RT), Ambrocio Gregory M.D., 3 mL at 06/25/18 0715  •  furosemide (LASIX) injection 40 mg, 40 mg, Intravenous, Q DAY, Francisca Lafleur M.D., 40 mg at 06/24/18 0834  •  heparin injection 3,000 Units, 3,000 Units, Intravenous, PRN, Francisca Lafleur M.D., 3,000 Units at 06/18/18 1734  •  propranolol (INDERAL) tablet 10 mg, 10 mg, Per NG Tube, Q8HRS, Juan Hernandez M.D., 10 mg at 06/25/18 0608  •  spironolactone (ALDACTONE) tablet 50 mg, 50 mg, Per NG Tube, BID DIURETIC, Juan Hernandez M.D., 50 mg at 06/25/18 0608  •  amLODIPine (NORVASC) tablet 5 mg, 5 mg, Oral, Q DAY, Dmitry Covington M.D., 5 mg at 06/24/18 0833  •  labetalol (NORMODYNE,TRANDATE) injection 10 mg, 10 mg, Intravenous, Q4HRS PRN, SABA Marie.O., 10 mg at 06/17/18 2014  •  riFAXIMin (XIFAXAN) tablet 550 mg, 550 mg, Oral, BID, Alexis Castillo D.O., 550 mg at 06/24/18 2051  •  docusate sodium 100mg/10mL (COLACE) solution 100 mg, 100 mg, Oral, BID,  100 mg at 06/24/18 2050 **OR** [DISCONTINUED] docusate sodium (COLACE) capsule 100 mg, 100 mg, Oral, BID, Stefany Terry M.D.  •  oxyCODONE immediate-release (ROXICODONE) tablet 5-15 mg, 5-15 mg, Oral, Q3HRS PRN, Chris Puente M.D., 10 mg at 06/25/18 0211  •  enoxaparin (LOVENOX) inj 30 mg, 30 mg, Subcutaneous, Q12HRS, Des Ramirez M.D., 30 mg at 06/24/18 2051  •  Respiratory Care per Protocol, , Nebulization, Continuous RT, Des Ramirez M.D.  •  ipratropium-albuterol (DUONEB) nebulizer solution, 3 mL, Nebulization, Q4H PRN (RT), Stefany Terry M.D., 3 mL at 06/07/18 0820  •  Pharmacy Consult Request ...Pain Management Review 1 Each, 1 Each, Other, PRN, Stefany Terry M.D.  •  senna-docusate (PERICOLACE or SENOKOT S) 8.6-50 MG per tablet 1 Tab, 1 Tab, Oral, Nightly, Stefany Terry M.D., 1 Tab at 06/24/18 2050  •  senna-docusate (PERICOLACE or SENOKOT S) 8.6-50 MG per tablet 1 Tab, 1 Tab, Oral, Q24HRS PRN, Stefany Terry M.D.  •  polyethylene glycol/lytes (MIRALAX) PACKET 1 Packet, 1 Packet, Oral, BID, Stefany Terry M.D., 1 Packet at 06/24/18 2050  •  magnesium hydroxide (MILK OF MAGNESIA) suspension 30 mL, 30 mL, Oral, DAILY, Stefany Terry M.D., Stopped at 06/24/18 0900  •  bisacodyl (DULCOLAX) suppository 10 mg, 10 mg, Rectal, Q24HRS PRN, Stefany Terry M.D., 10 mg at 06/02/18 0957  •  fleet enema 133 mL, 1 Each, Rectal, Once PRN, Stefany Terry M.D.  •  famotidine (PEPCID) tablet 20 mg, 20 mg, Oral, BID, 20 mg at 06/24/18 2051 **OR** [DISCONTINUED] famotidine (PEPCID) injection 20 mg, 20 mg, Intravenous, BID, Stefany Terry M.D., 20 mg at 06/08/18 2120  •  ondansetron (ZOFRAN) syringe/vial injection 4 mg, 4 mg, Intravenous, Q4HRS PRN, Stefany Terry M.D., 4 mg at 06/14/18 0208  •  albuterol inhaler 2 Puff, 2 Puff, Inhalation, Q4HRS PRN, Stefany Terry M.D.      PHYSICAL EXAM    Vitals:    06/25/18 0600 06/25/18 0700 06/25/18 0734 06/25/18 0800   BP:       Pulse: 89 73  84   Resp: 18 15  20    Temp:       SpO2: 93% 95% 100% 94%   Weight:       Height:           Head/Neck: NCAT no meningismus neg kernig neg brudzinski. No obvious mass or heard bruit. No tender arteries or lost pulses.  Skin: Warm, dry, intact. No rashes observed head/neck or body  Eyes/Funduscopic: unable    Mental Status: Awake, not full alert, follows command to smile pantomime.  Cranial Nerves: PERRL 3mm.   No afferent pupillary defect. EOM full but still not fully cooperative, element of ophthalmoplegia continues. VF full. sym grimace & eye closure. No nystagmus.     Motor: less bulk, increased tone. Flaccid x4. no abn mvmts.  Sensory: no wthdrw to light pain  Coordination: n/a  DTR's: increased now, cross ad sign lower extrem spreads to ankle as does patellar bilat  Gait/Station: n/a          Labs:    Total Protein, CSF 67   15 - 45 mg/dL Final           Recent Labs      06/23/18 0430 06/24/18 0517 06/25/18   0322   WBC  15.4*  10.3  13.4*   RBC  2.69*  3.07*  2.64*   HEMOGLOBIN  8.1*  9.4*  8.1*   HEMATOCRIT  26.4*  29.8*  25.8*   MCV  98.1*  97.1  97.7   MCH  30.1  30.6  30.7   MCHC  30.7*  31.5*  31.4*   RDW  52.1*  52.5*  51.8*   PLATELETCT  369  369  330   MPV  12.3  11.6  12.0     Recent Labs      06/23/18 0430 06/24/18   0517  06/25/18   0322   SODIUM  143  141  140   POTASSIUM  4.5  5.1  4.2   CHLORIDE  108  108  106   CO2  29  27  26   GLUCOSE  120*  117*  118*   BUN  46*  43*  49*   CREATININE  0.90  0.87  0.86   CALCIUM  8.6  8.8  8.6                     Recent Labs      06/23/18 0430  06/24/18   0517  06/25/18   0322   SODIUM  143  141  140   POTASSIUM  4.5  5.1  4.2   CHLORIDE  108  108  106   CO2  29  27  26   GLUCOSE  120*  117*  118*   BUN  46*  43*  49*     Recent Labs      06/23/18   0430  06/24/18   0517  06/25/18   0322   SODIUM  143  141  140   POTASSIUM  4.5  5.1  4.2   CHLORIDE  108  108  106   CO2  29  27  26   BUN  46*  43*  49*   CREATININE  0.90  0.87  0.86   MAGNESIUM   --    --   2.5    PHOSPHORUS   --    --   3.8   CALCIUM  8.6  8.8  8.6         Results for orders placed or performed during the hospital encounter of 05/30/18   ECHOCARDIOGRAM COMP W/O CONT   Result Value Ref Range    Eject.Frac. MOD BP 69.48     Eject.Frac. MOD 4C 71     Eject.Frac. MOD 2C 59.71     Left Ventrical Ejection Fraction 70               Imaging: neuroimaging reviewed and directly visualized by me  DX-CHEST-PORTABLE (1 VIEW)   Final Result         1.  Pulmonary edema and/or infiltrates are identified, which are stable since the prior exam.      DX-CHEST-PORTABLE (1 VIEW)   Final Result         1. No significant interval change.            DX-CHEST-PORTABLE (1 VIEW)   Final Result         1. No significant interval change.         DX-CHEST-PORTABLE (1 VIEW)   Final Result         1. No significant interval change.      DX-CHEST-PORTABLE (1 VIEW)   Final Result         1. No significant interval change.      DX-CHEST-LIMITED (1 VIEW)   Final Result      Dialysis catheter projects over the proximal SVC.      Perihilar and bibasilar airspace opacities, right greater than left are again noted.      Pulmonary edema is likely present.      There may be a small right pleural effusion.      Multiple right-sided rib fractures and right clavicle fracture.      Cardiomegaly.            DX-ABDOMEN FOR TUBE PLACEMENT   Final Result      Enteric tube projects over the distal stomach.      DX-CHEST-PORTABLE (1 VIEW)   Final Result      Stable chest with right greater than left basilar atelectasis, probable right pleural fluid and hazy opacity compatible with contusion and/or edema      DX-CHEST-PORTABLE (1 VIEW)   Final Result      No significant change from prior exam.      DX-CHEST-PORTABLE (1 VIEW)   Final Result      1.  Worsening bilateral pulmonary infiltrate.   2.  No other significant change from prior exam.         DX-CHEST-PORTABLE (1 VIEW)   Final Result      Right internal jugular line tip overlies the SVC. No  pneumothorax.   Bilateral perihilar/lung base atelectasis and edema and small right pleural effusion similar to recent findings.      DX-CHEST-PORTABLE (1 VIEW)   Final Result      No significant change from prior exam.      DX-CHEST-PORTABLE (1 VIEW)   Final Result         1.  Pulmonary edema and/or infiltrates are identified, which appear somewhat increased since the prior exam.   2.  Trace layering right pleural effusion   3.  Cardiomegaly   4.  Comminuted right clavicular fracture                     DX-CHEST-PORTABLE (1 VIEW)   Final Result         1.  Pulmonary edema and/or infiltrates are identified, which are stable since the prior exam.   2.  Cardiomegaly   3.  Comminuted right clavicular fracture                  DX-CHEST-PORTABLE (1 VIEW)   Final Result         1.  Pulmonary edema and/or infiltrates are identified, which are stable since the prior exam.   2.  Cardiomegaly   3.  Comminuted right clavicular fracture               ECHOCARDIOGRAM COMP W/O CONT   Final Result      MR-THORACIC SPINE-W/O   Final Result      1.  Multilevel degenerative change of thoracic spine.   2.  No gross abnormal signal within the thoracic cord to indicate edema or infarct.   3.  No epidural hematoma demonstrated.      MR-CERVICAL SPINE-W/O   Final Result      1.  Limited exam showing no focal abnormal signal within the cervical cord to indicate acute infarct.   2.  Multilevel degenerative disc disease with mild disc bulging at C3-4, C4-5, C5-6 and C6-7 as seen previously.      DX-CHEST-PORTABLE (1 VIEW)   Final Result         1.  Pulmonary edema and/or infiltrates are identified, which are stable since the prior exam.   2.  Cardiomegaly            DX-CHEST-PORTABLE (1 VIEW)   Final Result         1.  Pulmonary edema and/or infiltrates are identified, which are stable since the prior exam.   2.  Cardiomegaly   3.  Right rib fractures         MR-BRAIN-W/O   Final Result   Addendum 1 of 1   These findings were discussed with  YEFRI FRAZIER on 6/12/2018 2:08 PM.      Final      1.  Punctate acute subcortical infarcts in the posterior frontal regions bilaterally, potentially embolic.   2.  No evidence for intracranial hemorrhage.   3.  Mild diffuse atrophy.   4.  Bilateral mastoid fluid, likely inflammatory.   5.  Acute and chronic paranasal sinus inflammatory changes.      MR-CERVICAL SPINE-W/O   Final Result      1.  Multilevel degenerative changes cervical spine with minimal reversal of curvature.   2.  Multilevel posterior disc bulging without evidence for cervical cord edema or myelopathy.   3.  Subtle findings raising concern for injury to the posterior longitudinal ligament at the C6-7 level with possible disruption of the disc as well.   4.  No epidural hematoma demonstrated.      DX-CHEST-PORTABLE (1 VIEW)   Final Result         1.  Pulmonary edema and/or infiltrates are identified, which are stable since the prior exam.   2.  Cardiomegaly   3.  Right rib fractures      NM-BILIARY (HIDA) SCAN WITH CCK   Final Result      Delayed activity within the gallbladder is nonspecific, possibly chronic cholecystitis in the appropriate clinical setting.      [id number] VIEW   Final Result      1. Air-filled colon without significant distention.      2. Feeding tube tip projects over the duodenum.      TRAUMA-LE VENOUS SCREENING   Final Result      DX-CHEST-PORTABLE (1 VIEW)   Final Result         1.  Pulmonary edema and/or infiltrates are identified, which are stable since the prior exam.   2.  Cardiomegaly      US-GALLBLADDER   Final Result      Cholelithiasis with mild gallbladder wall thickening and trace pericholecystic fluid. Findings can be seen in acute cholecystitis in the correct clinical setting. Gallbladder wall thickening can also be seen in hepatitis, cirrhosis, hypoproteinemia.      Enlarged, heterogeneous and echogenic appearance of the liver can be seen in hepatic steatosis or hepatocellular disease.      Limited  evaluation of the pancreas and aorta which are obscured.         CT-CHEST,ABDOMEN,PELVIS W/O   Final Result      Small bilateral pleural effusions with overlying atelectasis/consolidation, right greater than left. Foci of air are seen within the pleural fluid on the right which may be related to the presence of the chest tube but can be seen in the setting of an    empyema. Cavitary consolidation is not excluded.      Patchy and ground glass opacities bilaterally are likely infectious/inflammatory.      No pneumothorax.      Mildly prominent mediastinal lymph nodes likely reactive.      Small amount of free fluid in the abdomen and pelvis.      Density within the gallbladder may represent vicarious excretion of contrast versus sludge. There is pericholecystic fluid. Further evaluation can be obtained with right upper quadrant ultrasound.      There appears to be mild duodenal wall thickening which may be in can be seen in duodenitis or peptic ulcer disease.      Possible punctate nonobstructing left renal calculus.      Colonic diverticulosis.      Bladder is decompressed by a Roberts catheter. Bladder wall thickening not excluded. Correlation with urinalysis recommended.      Third spacing.      Multisegmented right-sided rib fractures.      Comminuted right clavicle fracture.      Splenomegaly.         DX-CHEST-PORTABLE (1 VIEW)   Final Result      Improving bibasilar atelectasis.      DX-CHEST-PORTABLE (1 VIEW)   Final Result      Bilateral airspace opacities are not significantly changed compared to prior.      Small pleural effusions are not excluded.      No pneumothorax is identified.      Right clavicle fracture and right-sided rib fractures are again noted.         CT-HEAD W/O   Final Result         1. No evidence of acute intracranial hemorrhage or mass lesion.      2. New complete opacification of the bilateral mastoid air cells. New air-fluid level in the sphenoid sinuses. Correlate for infection.          DX-CHEST-PORTABLE (1 VIEW)   Final Result      Stable chest findings compared to 6/8.      DX-CHEST-PORTABLE (1 VIEW)   Final Result      Stable chest appearance compared with 6/7.      DX-CHEST-PORTABLE (1 VIEW)   Final Result      Worsening bilateral airspace opacities.      DX-ABDOMEN FOR TUBE PLACEMENT   Final Result      1.  NG tube and feeding tube as described above.      DX-CHEST-PORTABLE (1 VIEW)   Final Result         1. Worsening left lower lung opacities could relate to worsening atelectasis, edema or infection.      DX-CHEST-PORTABLE (1 VIEW)   Final Result         1. No significant interval change.      DX-SMALL BOWEL SERIES   Final Result      No radiographic evidence of bowel obstruction      Prolonged contrast transit time to the colon indicates ileus      Findings were discussed with CARMEN KING on 6/4/2018 6:10 PM.      US-ABDOMEN COMPLETE SURVEY   Final Result      1.  Cholelithiasis   2.  Splenomegaly   3.  Enlarged portal vein   4.  These findings suggest portal hypertension   5.  Subcentimeter LEFT hepatic cyst   6.  Echogenic liver, a nonspecific finding that often is found to represent steatosis.  Other infiltrative processes could have an identical appearance.         ECHOCARDIOGRAM-COMP W/ CONT   Final Result      DX-CHEST-PORTABLE (1 VIEW)   Final Result         1. No significant interval change.      DX-CHEST-PORTABLE (1 VIEW)   Final Result         1.  Pulmonary edema and/or infiltrates are identified, which are stable since the prior exam.   2.  Decreased soft tissue gas in the right chest wall and neck.   3.  Right rib fractures   4.  Cardiomegaly            DX-CHEST-PORTABLE (1 VIEW)   Final Result      Right subclavian catheter placement with the tip projecting over the superior vena cava. No pneumothorax is identified. Exam is otherwise unchanged.      DX-CHEST-PORTABLE (1 VIEW)   Final Result         1.  Pulmonary edema and/or infiltrates are identified, which are stable since  the prior exam.   2.  Decreased soft tissue gas in the right chest wall and neck.   3.  Right rib fractures   4.  Cardiomegaly         DX-CHEST-PORTABLE (1 VIEW)   Final Result      1.  Interval placement of RIGHT chest tube.   2.  No other significant change from prior exam.         DX-CHEST-PORTABLE (1 VIEW)   Final Result      1.  Endotracheal tube and enteric catheter has been placed and appear appropriately located      2.  Bilateral atelectasis and/or pulmonary contusion      3.  Right chest wall air      4.  Right rib fracture      DX-CHEST-PORTABLE (1 VIEW)   Final Result         1.  Pulmonary edema and/or infiltrates are identified, which appear somewhat increased since the prior exam.   2.  Stable soft tissue gas in the right chest wall and neck.   3.  Right rib fractures      DX-CHEST-PORTABLE (1 VIEW)   Final Result         1.  Pulmonary edema and/or infiltrates are identified, which appear somewhat increased since the prior exam.   2.  Increased soft tissue gas in the right chest wall and neck.   3.  Right rib fractures      CT-CHEST,ABDOMEN,PELVIS WITH   Final Result      1.  Multiple right rib fractures including multisegmented fractures and displaced fourth through sixth rib fractures.   2.  Small hemopneumothorax.   3.  Atelectasis and or contusions within the right lung and within the left lower lobe.   4.  Comminuted angulated fracture of the distal right clavicle incompletely imaged.   5.  Right scapula is incompletely imaged.   6.  Aorta appears intact. No mediastinal or retroperitoneal hematoma.   7.  No intra-abdominal solid organ injury identified.   8.  Diverticulosis of the colon.   9.  No free fluid or free air.   10.  Hepatic steatosis and mild splenomegaly.   Findings were discussed with YANY CISNEROS on 5/30/2018 4:22 PM.      CT-LSPINE W/O PLUS RECONS   Final Result      Degenerative changes as above described.      Hepatic steatosis.      Colonic diverticulosis.      Small right  hemothorax with overlying atelectasis/contusion.      CT-TSPINE W/O PLUS RECONS   Final Result      Minute right pneumothorax.      Small right hemothorax overlying atelectasis/contusion. Ground glass opacities in the right upper lobe likely represent small pulmonary contusions.      Soft tissue air in the posterior right hemithorax and right supraclavicular region.      Multiple right-sided rib fractures.      Degenerative changes in the thoracic spine.         CT-CSPINE WITHOUT PLUS RECONS   Final Result      Multilevel degenerative changes in the cervical spine.      Comminuted fractures of the right first, second and third ribs.      Patchy groundglass opacity at the right lung apex may represent a contusion.      Soft tissue air in the right supraclavicular region and posterior right hemithorax.      Air-fluid level in the left maxillary sinus can be seen in acute sinusitis.      CT-HEAD W/O   Final Result      No acute intracranial abnormality is identified.      Paranasal sinus disease.      Frontal soft tissue swelling.         DX-CHEST-LIMITED (1 VIEW)   Final Result      1.  Multiple right rib fractures and possible right clavicle and scapular fractures.   2.  Possible trace right pneumothorax.   3.  Left lung base atelectasis.   Findings were discussed with YANY CISNEROS on 5/30/2018 3:36 PM.      DX-SHOULDER 2+ RIGHT   Final Result         1.  Normal shoulder series.      2.  Fractures of the right fourth through sixth ribs laterally.      MR-BRAIN-W/O    (Results Pending)   MR-MRA HEAD-W/O    (Results Pending)   MR-MRA NECK-W/O    (Results Pending)      EMG/NCV 6/18  DIAGNOSTIC INTERPRETATION:   Extensive electrodiagnostic studies were performed to the left upper and left lower extremities. The studies were abnormal. The study was limited by edema.      DIAGNOSIS:   1)-Acute, moderate to severe, widespread, axonal, sensory and motor polyneuropathy.      DISCUSSION:   For this particular patient, the  findings may represent either an Acute Motor and Sensory Axonal Neuropathy (AMSAN, which is an axonal form of GBS), versus Critical Illness Polyneuropathy (CIP). At this point in time, impossible to differentiate the two on the basis of this test, but overall favoring AMSAN over CIP.           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  AU  Matthieu PEREIRA, Jaye PEREIRA   SO  J Neurol Neurosurg Psychiatry. 2013;84(5):576.   In the 1950s, Bickerstaff and Bakari independently described cases with a unique presentation of ophthalmoplegia and ataxia. The neurological features were typically preceded by an antecedent infection and the majority of patients made a spontaneous recovery. In the cases with Bickerstaff brainstem encephalitis, there was associated altered consciousness and in some, hyperreflexia, in support of a central pathology whereas in Villegas syndrome, patients were areflexic in keeping with a peripheral aetiology. However, both authors recognised certain similarities to Guillain-Barrésyndrome such as the presence of peripheral neuropathy and cerebrospinal fluid albuminocytological dissociation. The discovery of immunoglobulin G anti-GQ1b antibodies in patients with Villegas  syndrome and later in Bickerstaff brainstem encephalitis was crucial in providing the necessary evidence to conclude that both conditions were in fact part of the same spectrum of disease by virtue of their common clinical and immunological profiles. Following this, other neurological presentations that share anti-GQ1b antibodies emerged in the literature. These include acute ophthalmoparesis and acute ataxic neuropathy, which represent the less extensive spectrum of the disease whereas pharyngeal-cervical-brachial weakness and Villegas syndrome overlap with Guillain-Barrésyndrome represent the more extensive end of the spectrum. The conditions can be referred to as the 'anti-GQ1b antibody syndrome'. In this review, we look back at the historical descriptions and describe how our understanding of Villegas syndrome and Bickerstaff brainstem encephalitis has evolved from their initial descriptions more than half a century ago.    AD  Department of Medicine, Unit 09-01, [facility], [facility]; [email or web address].   PMID   [id number]          Kashmir Rush M.D.  , Neurohospitalist & Stroke  Clinical Professor, [employer]  Diplomate, Neurology & Neuroimaging

## 2018-06-25 NOTE — EEG PROGRESS NOTE
ROUTINE VIDEO ELECTROENCEPHALOGRAM REPORT      NAME: Devonte Sotelo    REFERRING Dr: UTE    DURATION: 28 minutes    INDICATION: Change Of Mental Status      TECHNIQUE: 30 channel routine electroencephalogram (EEG) was performed in accordance with the international 10-20 system. The study was reviewed in bipolar and referential montages. The recording examined the patient during wakeful and drowsy state(s).     DESCRIPTION OF THE RECORD:      Background rhythm during awake stage shows average voltage 3 to 5 hertz activity in the posterior regions. No spike-and-wave discharges or any lateralizing abnormalities are seen. There are persistent poly and monomorphic delta slow Stage I sleep was achieved.      ACTIVATION PROCEDURES:      Hyperventilation and Photic Stimulation were not done    ICTAL AND/OR INTERICTAL FINDINGS:    No clinical events or seizures were reported or recorded during the study.      EKG: sampling of the EKG recording demonstrated sinus rhythm.        INTERPRETATION:        ________________________________________________________________________    This is abnormal routine video EEG recording in the awake and drowsy/sleep state(s).    This scalp EEG denotes      1. Diffuse nonspecific cortical dysfunction - moderate to severe    This nonspecific abnormalities could be secondary to metabolic, toxic, polypharmacy or even post-ictal    EEG 06/25/18 4:08 PM    ________________________________________________________________________

## 2018-06-25 NOTE — CARE PLAN
Problem: Ventilation Defect:  Goal: Ability to achieve and maintain unassisted ventilation or tolerate decreased levels of ventilator support  Outcome: PROGRESSING AS EXPECTED  Adult Ventilation Update    Total Vent Days: 19    Patient Lines/Drains/Airways Status    Active Airway     Name: Placement date: Placement time: Site: Days:    Airway Group Portex Trach Tracheostomy 8.0 06/07/18   1055   Tracheostomy   17              In the last 24 hours, the patient tolerated SBT for 10 hours and 20 minutes on settings of 30% T-piece    Plateau Pressure (Q Shift): 19 (06/25/18 0241)  Static Compliance (ml / cm H2O): 57.4 (06/25/18 0241)    3 day post trache.  The patient is currently in Trache wean according to protocol.    (ASV) % MIN VOL: 120 (06/25/18 0241)  ASV Inspiratory Pressures  % Spontaneous: 100%    Sputum/Suction: Suction  Cough:  (none) (06/25/18 0241)  Sputum Amount: Large (06/24/18 1630)  Sputum Color: Tan;Yellow (06/24/18 1630)  Sputum Consistency: Thick (06/24/18 1630)    Mobility  Level of Mobility: Level I (06/24/18 1600)  Activity Performed: Unable to mobilize (06/24/18 2200)  Time Activity Tolerated: 0 min (06/24/18 1600)  Assistance / Tolerance: Assistance of Two or More;Does Not Tolerate (06/24/18 1600)  Pt Calls for Assistance: No (06/24/18 1600)  Staff Present for Mobilization: RN;Other (comment) (x2) (06/24/18 1600)  Reason Not Mobilized: Unstable condition (patient very lethargic) (06/24/18 2200)      Events/Summary/Plan: PT remains stable on current ASV settings with no changes made throughout the evening. Will continue to monitor PT closely. (06/25/18 0241)

## 2018-06-25 NOTE — THERAPY
"Speech Language Therapy dysphagia treatment completed.   Functional Status:  The patient was seen for voice therapy this date with use of speaking valve. The patient tolerated tracheal suctioning, cuff delfation and speaking valve placement with stable respiratory status (SpO2 92-95% on 8L FiO2 35%, HR 76-80, RR 19-22) although periods of increased coughing. Patient was noted to have strong vocal quality during single spontaneous sounds but was unable to follow directives for cued sounds or vocalizations. Patient had speaking valve on for 15 minutes before coughing increased and speaking valve was removed with slight back pressure. At this time, recommend only RT/SLP place with direct supervision. SLP following closely.     Recommendations: 1) Only RT/SLP place with direct supervision place speaking valve. SLP following closely.     Plan of Care: Will benefit from Speech Therapy 5 times per week  Post-Acute Therapy: Discharge to a transitional care facility for continued skilled therapy services.    See \"Rehab Therapy-Acute\" Patient Summary Report for complete documentation.     "

## 2018-06-25 NOTE — CARE PLAN
Problem: Oxygenation:  Goal: Maintain adequate oxygenation dependent on patient condition  Outcome: PROGRESSING AS EXPECTED  On 30% FIO2

## 2018-06-26 ENCOUNTER — APPOINTMENT (OUTPATIENT)
Dept: RADIOLOGY | Facility: MEDICAL CENTER | Age: 63
DRG: 003 | End: 2018-06-26
Attending: SURGERY
Payer: COMMERCIAL

## 2018-06-26 LAB
AMMONIA PLAS-SCNC: 27 UMOL/L (ref 11–45)
ANION GAP SERPL CALC-SCNC: 8 MMOL/L (ref 0–11.9)
BACTERIA SPEC RESP CULT: ABNORMAL
BACTERIA SPEC RESP CULT: ABNORMAL
BASOPHILS # BLD AUTO: 0.2 % (ref 0–1.8)
BASOPHILS # BLD: 0.03 K/UL (ref 0–0.12)
BUN SERPL-MCNC: 40 MG/DL (ref 8–22)
CALCIUM SERPL-MCNC: 8.9 MG/DL (ref 8.5–10.5)
CHLORIDE SERPL-SCNC: 103 MMOL/L (ref 96–112)
CO2 SERPL-SCNC: 27 MMOL/L (ref 20–33)
CREAT SERPL-MCNC: 0.69 MG/DL (ref 0.5–1.4)
EOSINOPHIL # BLD AUTO: 0.39 K/UL (ref 0–0.51)
EOSINOPHIL NFR BLD: 2.8 % (ref 0–6.9)
ERYTHROCYTE [DISTWIDTH] IN BLOOD BY AUTOMATED COUNT: 47.5 FL (ref 35.9–50)
GLUCOSE SERPL-MCNC: 122 MG/DL (ref 65–99)
GRAM STN SPEC: ABNORMAL
HCT VFR BLD AUTO: 25.9 % (ref 42–52)
HGB BLD-MCNC: 8.3 G/DL (ref 14–18)
HGB RETIC QN AUTO: 32.7 PG/CELL (ref 29–35)
IMM GRANULOCYTES # BLD AUTO: 0.09 K/UL (ref 0–0.11)
IMM GRANULOCYTES NFR BLD AUTO: 0.6 % (ref 0–0.9)
IMM RETICS NFR: 16.8 % (ref 9.3–17.4)
IRON SATN MFR SERPL: 7 % (ref 15–55)
IRON SERPL-MCNC: 14 UG/DL (ref 50–180)
LYMPHOCYTES # BLD AUTO: 2.6 K/UL (ref 1–4.8)
LYMPHOCYTES NFR BLD: 18.8 % (ref 22–41)
MCH RBC QN AUTO: 30.5 PG (ref 27–33)
MCHC RBC AUTO-ENTMCNC: 32 G/DL (ref 33.7–35.3)
MCV RBC AUTO: 95.2 FL (ref 81.4–97.8)
MONOCYTES # BLD AUTO: 1.11 K/UL (ref 0–0.85)
MONOCYTES NFR BLD AUTO: 8 % (ref 0–13.4)
NEUTROPHILS # BLD AUTO: 9.63 K/UL (ref 1.82–7.42)
NEUTROPHILS NFR BLD: 69.6 % (ref 44–72)
NRBC # BLD AUTO: 0 K/UL
NRBC BLD-RTO: 0 /100 WBC
PLATELET # BLD AUTO: 331 K/UL (ref 164–446)
PMV BLD AUTO: 11.5 FL (ref 9–12.9)
POTASSIUM SERPL-SCNC: 4.2 MMOL/L (ref 3.6–5.5)
RBC # BLD AUTO: 2.72 M/UL (ref 4.7–6.1)
RETICS # AUTO: 0.08 M/UL (ref 0.04–0.06)
RETICS/RBC NFR: 2.9 % (ref 0.8–2.1)
SIGNIFICANT IND 70042: ABNORMAL
SITE SITE: ABNORMAL
SODIUM SERPL-SCNC: 138 MMOL/L (ref 135–145)
SOURCE SOURCE: ABNORMAL
TIBC SERPL-MCNC: 204 UG/DL (ref 250–450)
WBC # BLD AUTO: 13.9 K/UL (ref 4.8–10.8)

## 2018-06-26 PROCEDURE — 700105 HCHG RX REV CODE 258: Performed by: PSYCHIATRY & NEUROLOGY

## 2018-06-26 PROCEDURE — 700102 HCHG RX REV CODE 250 W/ 637 OVERRIDE(OP): Performed by: SURGERY

## 2018-06-26 PROCEDURE — 94669 MECHANICAL CHEST WALL OSCILL: CPT

## 2018-06-26 PROCEDURE — 770022 HCHG ROOM/CARE - ICU (200)

## 2018-06-26 PROCEDURE — A9270 NON-COVERED ITEM OR SERVICE: HCPCS | Performed by: SURGERY

## 2018-06-26 PROCEDURE — 97112 NEUROMUSCULAR REEDUCATION: CPT

## 2018-06-26 PROCEDURE — L4398 FOOT DROP SPLINT PRE OTS: HCPCS

## 2018-06-26 PROCEDURE — 97535 SELF CARE MNGMENT TRAINING: CPT

## 2018-06-26 PROCEDURE — 83550 IRON BINDING TEST: CPT

## 2018-06-26 PROCEDURE — 71045 X-RAY EXAM CHEST 1 VIEW: CPT

## 2018-06-26 PROCEDURE — 85025 COMPLETE CBC W/AUTO DIFF WBC: CPT

## 2018-06-26 PROCEDURE — 80048 BASIC METABOLIC PNL TOTAL CA: CPT

## 2018-06-26 PROCEDURE — 94640 AIRWAY INHALATION TREATMENT: CPT

## 2018-06-26 PROCEDURE — 700112 HCHG RX REV CODE 229: Performed by: SURGERY

## 2018-06-26 PROCEDURE — 83540 ASSAY OF IRON: CPT

## 2018-06-26 PROCEDURE — 700111 HCHG RX REV CODE 636 W/ 250 OVERRIDE (IP): Performed by: SURGERY

## 2018-06-26 PROCEDURE — 700111 HCHG RX REV CODE 636 W/ 250 OVERRIDE (IP): Performed by: PSYCHIATRY & NEUROLOGY

## 2018-06-26 PROCEDURE — 97530 THERAPEUTIC ACTIVITIES: CPT

## 2018-06-26 PROCEDURE — 85046 RETICYTE/HGB CONCENTRATE: CPT

## 2018-06-26 PROCEDURE — 700111 HCHG RX REV CODE 636 W/ 250 OVERRIDE (IP): Performed by: INTERNAL MEDICINE

## 2018-06-26 PROCEDURE — 82140 ASSAY OF AMMONIA: CPT

## 2018-06-26 PROCEDURE — 94003 VENT MGMT INPAT SUBQ DAY: CPT

## 2018-06-26 PROCEDURE — 99291 CRITICAL CARE FIRST HOUR: CPT | Performed by: SURGERY

## 2018-06-26 PROCEDURE — 700101 HCHG RX REV CODE 250: Performed by: SURGERY

## 2018-06-26 PROCEDURE — 700105 HCHG RX REV CODE 258: Performed by: SURGERY

## 2018-06-26 RX ADMIN — FAMOTIDINE 20 MG: 20 TABLET ORAL at 09:07

## 2018-06-26 RX ADMIN — ALBUTEROL SULFATE 2.5 MG: 2.5 SOLUTION RESPIRATORY (INHALATION) at 06:49

## 2018-06-26 RX ADMIN — PROPRANOLOL HYDROCHLORIDE 10 MG: 10 TABLET ORAL at 21:49

## 2018-06-26 RX ADMIN — ALBUTEROL SULFATE 2.5 MG: 2.5 SOLUTION RESPIRATORY (INHALATION) at 22:55

## 2018-06-26 RX ADMIN — FUROSEMIDE 40 MG: 10 INJECTION, SOLUTION INTRAMUSCULAR; INTRAVENOUS at 09:07

## 2018-06-26 RX ADMIN — LORAZEPAM 2 MG: 2 INJECTION INTRAMUSCULAR; INTRAVENOUS at 05:08

## 2018-06-26 RX ADMIN — SODIUM CHLORIDE 1000 MG: 9 INJECTION, SOLUTION INTRAVENOUS at 05:45

## 2018-06-26 RX ADMIN — OXYCODONE HYDROCHLORIDE 10 MG: 5 TABLET ORAL at 04:21

## 2018-06-26 RX ADMIN — SODIUM CHLORIDE 1000 MG: 9 INJECTION, SOLUTION INTRAVENOUS at 13:56

## 2018-06-26 RX ADMIN — ALBUTEROL SULFATE 2.5 MG: 2.5 SOLUTION RESPIRATORY (INHALATION) at 02:31

## 2018-06-26 RX ADMIN — ALBUTEROL SULFATE 2.5 MG: 2.5 SOLUTION RESPIRATORY (INHALATION) at 19:33

## 2018-06-26 RX ADMIN — ONDANSETRON HYDROCHLORIDE 4 MG: 2 INJECTION, SOLUTION INTRAMUSCULAR; INTRAVENOUS at 21:36

## 2018-06-26 RX ADMIN — IRON SUCROSE 200 MG: 20 INJECTION, SOLUTION INTRAVENOUS at 17:40

## 2018-06-26 RX ADMIN — PROPRANOLOL HYDROCHLORIDE 10 MG: 10 TABLET ORAL at 05:28

## 2018-06-26 RX ADMIN — CEFEPIME 2 G: 2 INJECTION, POWDER, FOR SOLUTION INTRAVENOUS at 21:49

## 2018-06-26 RX ADMIN — RIFAXIMIN 550 MG: 550 TABLET ORAL at 09:08

## 2018-06-26 RX ADMIN — ENOXAPARIN SODIUM 30 MG: 100 INJECTION SUBCUTANEOUS at 19:54

## 2018-06-26 RX ADMIN — CEFEPIME 2 G: 2 INJECTION, POWDER, FOR SOLUTION INTRAVENOUS at 05:25

## 2018-06-26 RX ADMIN — SODIUM CHLORIDE 1000 MG: 9 INJECTION, SOLUTION INTRAVENOUS at 21:49

## 2018-06-26 RX ADMIN — ALBUTEROL SULFATE 2.5 MG: 2.5 SOLUTION RESPIRATORY (INHALATION) at 11:03

## 2018-06-26 RX ADMIN — ENOXAPARIN SODIUM 30 MG: 100 INJECTION SUBCUTANEOUS at 09:07

## 2018-06-26 RX ADMIN — SPIRONOLACTONE 50 MG: 50 TABLET ORAL at 16:43

## 2018-06-26 RX ADMIN — SODIUM BICARBONATE 3 ML: 84 INJECTION, SOLUTION INTRAVENOUS at 06:49

## 2018-06-26 RX ADMIN — FAMOTIDINE 20 MG: 20 TABLET ORAL at 19:54

## 2018-06-26 RX ADMIN — AMLODIPINE BESYLATE 5 MG: 10 TABLET ORAL at 09:07

## 2018-06-26 RX ADMIN — SODIUM BICARBONATE 3 ML: 84 INJECTION, SOLUTION INTRAVENOUS at 02:31

## 2018-06-26 RX ADMIN — SENNOSIDES AND DOCUSATE SODIUM 1 TABLET: 8.6; 5 TABLET ORAL at 19:53

## 2018-06-26 RX ADMIN — CEFEPIME 2 G: 2 INJECTION, POWDER, FOR SOLUTION INTRAVENOUS at 13:57

## 2018-06-26 RX ADMIN — ALBUTEROL SULFATE 2.5 MG: 2.5 SOLUTION RESPIRATORY (INHALATION) at 14:57

## 2018-06-26 RX ADMIN — DOCUSATE SODIUM 100 MG: 50 LIQUID ORAL at 09:07

## 2018-06-26 RX ADMIN — PROPRANOLOL HYDROCHLORIDE 10 MG: 10 TABLET ORAL at 14:22

## 2018-06-26 RX ADMIN — RIFAXIMIN 550 MG: 550 TABLET ORAL at 19:54

## 2018-06-26 RX ADMIN — DOCUSATE SODIUM 100 MG: 50 LIQUID ORAL at 19:53

## 2018-06-26 RX ADMIN — SODIUM BICARBONATE 3 ML: 84 INJECTION, SOLUTION INTRAVENOUS at 11:03

## 2018-06-26 RX ADMIN — SPIRONOLACTONE 50 MG: 50 TABLET ORAL at 05:28

## 2018-06-26 ASSESSMENT — COGNITIVE AND FUNCTIONAL STATUS - GENERAL
DRESSING REGULAR UPPER BODY CLOTHING: TOTAL
SUGGESTED CMS G CODE MODIFIER DAILY ACTIVITY: CN
WALKING IN HOSPITAL ROOM: TOTAL
DRESSING REGULAR LOWER BODY CLOTHING: TOTAL
MOVING FROM LYING ON BACK TO SITTING ON SIDE OF FLAT BED: UNABLE
CLIMB 3 TO 5 STEPS WITH RAILING: TOTAL
EATING MEALS: TOTAL
TURNING FROM BACK TO SIDE WHILE IN FLAT BAD: UNABLE
MOVING TO AND FROM BED TO CHAIR: UNABLE
SUGGESTED CMS G CODE MODIFIER MOBILITY: CN
PERSONAL GROOMING: TOTAL
DAILY ACTIVITIY SCORE: 6
HELP NEEDED FOR BATHING: TOTAL
STANDING UP FROM CHAIR USING ARMS: TOTAL
TOILETING: TOTAL
MOBILITY SCORE: 6

## 2018-06-26 ASSESSMENT — GAIT ASSESSMENTS: GAIT LEVEL OF ASSIST: UNABLE TO PARTICIPATE

## 2018-06-26 NOTE — CARE PLAN
Problem: Bowel/Gastric:  Goal: Will not experience complications related to bowel motility  Outcome: PROGRESSING AS EXPECTED      Problem: Psychosocial Needs:  Goal: Level of anxiety will decrease  Outcome: PROGRESSING AS EXPECTED

## 2018-06-26 NOTE — THERAPY
"Occupational Therapy Treatment completed with focus on ADLs, ADL transfers, cognition and upper extremity function.  Functional Status:  Total assist x2, total assist w/sitting EOB, intermittent postural reactions, more intention w/pain responses during movement of extremities, appeared hyper-sensitive to PROM of hands/fingers. Demonstrating fluctuating tone in BUE, noted flexion synergy both arms lifting up, w/mostly distal movement, but not to commands. W/sitting EOB, continued head turning left and right, provided wash cloth to pts face, which he more purposefully pushed his forehead into therapists hand and more rapidly shook his head, he appears to respond to tactile cues w/oral and facial interactions. Initially was closing his mouth when presented w/suction tooth brush, and then when he did open his mouth he was often biting the tooth brush. More facial grimacing and wincing this session. L-shoulder has increasing inferior subluxation, will attempt to address w/kineso tape next session. Re-educated RN on joint protection positions. Pt's overall functional status has not been improving, pt sat EOB nearly 30 min, vital signs remained stable and even w/possible pain did not fluctuate w/HR or O2 sats. Will reduce frequency to 2x/week d/t limited progress and monitor medical POC. Pt will continue to benefit from daily EOB mobility w/nsg and gentle PROM of UE.   Plan of Care: Will benefit from Occupational Therapy 2 times per week  Discharge Recommendations:  Equipment Will Continue to Assess for Equipment Needs. Post-acute therapy Discharge to a transitional care facility for continued skilled therapy services.    See \"Rehab Therapy-Acute\" Patient Summary Report for complete documentation.   "
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
## 2018-06-26 NOTE — CARE PLAN
Problem: Ventilation Defect:  Goal: Ability to achieve and maintain unassisted ventilation or tolerate decreased levels of ventilator support    Intervention: Support and monitor invasive and noninvasive mechanical ventilation  Adult Ventilation Update    Total Vent Days: 9  Trache day 4      Patient Lines/Drains/Airways Status    Active Airway     Name: Placement date: Placement time: Site: Days:    Airway Group Kaitlyn Trach Tracheostomy 8.0 06/07/18   1055   Tracheostomy   18              Pt currently on T-piece and should remain so for 12 hours.     #FVC / Vital Capacity (liters) : 1.1 (forced, pt did not follow) (06/17/18 0842)  NIF (cm H2O) : -23 (forced, pt did not follow) (06/17/18 0842)  Rapid Shallow Breathing Index (RR/VT): 38 (06/17/18 0842)  Plateau Pressure (Q Shift): 19 (06/25/18 0241)  Static Compliance (ml / cm H2O): 57.4 (06/25/18 0241)    Patient failed trials because of Barriers to Wean: FiO2 >60% or PEEP >10 CM H2O (06/10/18 0656)  Barriers to SBT Weaning Trial Stopped due to:: Abnormal breathing pattern (paradoxical respiratory pattern, use of accessory muscles, etc) (06/23/18 1415)  Length of Weaning Trial Length of Weaning Trial (Hours): 5 hours on t-piece (06/23/18 1415)      Cough: Congested;Moist;Productive;Strong (06/25/18 1513)  Sputum Amount: Large (06/25/18 1513)  Sputum Color: Yellow;Green;Grey (06/25/18 1513)  Sputum Consistency: Thick (06/25/18 1513)    Mobility  Level of Mobility: Level I (06/24/18 1600)  Activity Performed: Unable to mobilize (06/24/18 2200)  Time Activity Tolerated: 0 min (06/24/18 1600)  Distance Per Occurrence (ft.): 0 feet (06/22/18 1400)  # of Times Distance was Traveled: 0 (06/22/18 1400)  Assistance / Tolerance: Assistance of Two or More;Does Not Tolerate (06/24/18 1600)  Pt Calls for Assistance: No (06/24/18 1600)  Staff Present for Mobilization: RN;Other (comment) (x2) (06/24/18 1600)  Gait: Unable to Ambulate (06/22/18 0400)  Assistive Devices: Hand held  assist (06/23/18 2300)  Reason Not Mobilized: Unstable condition (patient very lethargic) (06/24/18 2200)  Mobilization Comments: High vent settings.  (06/13/18 2000)    Events/Summary/Plan: Pt placeed IPV then Tpiece (06/25/18 7526)

## 2018-06-26 NOTE — PROGRESS NOTES
PRAFO foot drop boot was delivered and fitted to patient.  If any further assistance needed, please call extension [phone] or place order for Ortho Technician assistance as a communication order in [employer].

## 2018-06-26 NOTE — CARE PLAN
Problem: Communication  Goal: The ability to communicate needs accurately and effectively will improve    Intervention: Educate patient and significant other/support system about the plan of care, procedures, treatments, medications and allow for questions  Patient educated about medications and purpose of select devices. Patient's wife, Reshma, was called with an update and all questions were answered within RN scope of practice.       Problem: Skin Integrity  Goal: Risk for impaired skin integrity will decrease    Intervention: Implement precautions to protect skin integrity in collaboration with the interdisciplinary team  Posterior skin integrity assessed for changes to existing DTI. No changes noted. Patient placed on low air loss mattress, heel float boots in place, and patient repositioned Q2 hours to prevent future pressure ulcers or impaired skin integrity.

## 2018-06-26 NOTE — THERAPY
Physical Therapy Treatment completed.   Bed Mobility:  Supine to Sit: Total Assist X 2  Transfers: Sit to Stand: Unable to Participate  Gait: Level Of Assist: Unable to Participate with No Equipment Needed       Plan of Care: Will benefit from Physical Therapy 2 times per week  Discharge Recommendations: Equipment: Will Continue to Assess for Equipment Needs.     Patient will open eyes but has non purposeful gaze. No tracking. No response to verbal commands. Pt was responsive to tactile input on his face, however this appeared to be only voluntary response. When sitting EOB pt fluctuates from flaccid to hypertonic. Appears to have emergence of fluctuating synergy patterns. When coughing pt exhibits flexion synergies of UEs and trunk. When in pain pt demonstrates extensor response of trunk. Legs are developing odd syngery pattern with R more severe than L. When pt moving in flexion synergy of UEs is exhibiting hip flexion, internal rotation, and adduction with knee flexion and ankle PF/IR. Joint approximation performed with manual weighted input into knees/feet when sitting EOB. This triggers R foot PF/IR and hip/knee flexion. D1 lower extremity flexion PNF pattern completed in attempts to increase motor response and decrease patient's newly developing R hip tone.  Hypertonic R LE with tight hip capsule. Facial grimacing with ER. Flaccid L LE with PNF movements. Requested PRAFO for R foot as pt is developing PF/IR contracture. Continues to have neurological decline. All movements in extremities and trunk appear to be reflexive, not voluntary. Only purposeful movement is coming from head/neck, and even this is only about 10% of time. Otherwise pt resumes his head rotation behavior. Frequency to be decreased as he currently is unable to actively participate with acute physical therapy services and continues to demonstrate a decline in cognition/function. Recommend continue mobilizing daily with nursing to EOB. Acute PT  "will continue to follow while in house and frequency to be increased when can purposefully participate.     See \"Rehab Therapy-Acute\" Patient Summary Report for complete documentation.       "

## 2018-06-26 NOTE — CARE PLAN
Problem: Bowel/Gastric:  Goal: Will not experience complications related to bowel motility    Intervention: Implement interventions to promote bowel evacuation if inadequate bowel movements in past 48 hours  Stool softeners provided to promote bowel movement. Measures successful. Patient and family educated on medications used and importance of regular bowel movement.      Problem: Knowledge Deficit  Goal: Knowledge of disease process/condition, treatment plan, diagnostic tests, and medications will improve    Intervention: Explain information regarding disease process/condition, treatment plan, diagnostic tests, and medications and document in education  Patient and wife educated about testing procedures (EEG, MRI) and sedative medication used during the testing process. Education was also provided about blood work done, and the impact of plasmaphoresis in the treatment of GBS. All questions answered.

## 2018-06-26 NOTE — CARE PLAN
Problem: Ventilation Defect:  Goal: Ability to achieve and maintain unassisted ventilation or tolerate decreased levels of ventilator support    Intervention: Manage ventilation therapy by monitoring diagnostic test results  Adult Ventilation Update    Total Vent Days: 10  Trach Days: 5    Patient Lines/Drains/Airways Status    Active Airway     Name: Placement date: Placement time: Site: Days:    Airway Group Portex Trach Tracheostomy 8.0 06/07/18   1055   Tracheostomy   18              In the last 24 hours, the patient tolerated SBT for 12 hours on T-piece at 40%.    #FVC / Vital Capacity (liters) : 1.1 (forced, pt did not follow) (06/17/18 0842)  NIF (cm H2O) : -23 (forced, pt did not follow) (06/17/18 0842)  Rapid Shallow Breathing Index (RR/VT): 38 (06/17/18 0842)  Plateau Pressure (Q Shift): 13 (06/25/18 2025)  Static Compliance (ml / cm H2O): 153 (06/25/18 2025)    Patient failed trials because of Barriers to Wean: FiO2 >60% or PEEP >10 CM H2O (06/10/18 0656)  Barriers to SBT Weaning Trial Stopped due to:: Pt weaned for 1 hour and returned to rest settings per protocol (06/25/18 2025)  Length of Weaning Trial Length of Weaning Trial (Hours): 12 hours on t- piece (06/25/18 2025)    5 day post trache.  The patient is currently in slow wean according to protocol.    Sputum/Suction   Cough: Congested;Productive (06/25/18 1952)  Sputum Amount: Small (06/25/18 1952)  Sputum Color: Yellow (06/25/18 1952)  Sputum Consistency: Thick (06/25/18 1952)    Mobility  Level of Mobility: Level III (06/25/18 1600)  Activity Performed: Edge of bed (06/25/18 1600)  Time Activity Tolerated: 5 min (06/25/18 1600)  Distance Per Occurrence (ft.): 0 feet (06/22/18 1400)  # of Times Distance was Traveled: 0 (06/22/18 1400)  Assistance / Tolerance: Assistance of Two or More;Tires quickly (06/25/18 1600)  Pt Calls for Assistance: No (06/25/18 1600)  Staff Present for Mobilization: RN;Other (comment) (Nursing student) (06/25/18  1600)  Gait: Unable to Ambulate (06/25/18 1600)  Assistive Devices: Hand held assist (06/25/18 1600)  Reason Not Mobilized: Unstable condition (patient very lethargic) (06/24/18 2200)  Mobilization Comments: High vent settings.  (06/13/18 2000)    Events/Summary/Plan: Pt placeed IPV then Tpiece (06/25/18 3735)

## 2018-06-27 ENCOUNTER — APPOINTMENT (OUTPATIENT)
Dept: RADIOLOGY | Facility: MEDICAL CENTER | Age: 63
DRG: 003 | End: 2018-06-27
Attending: SURGERY
Payer: COMMERCIAL

## 2018-06-27 PROBLEM — D50.9 IRON DEFICIENCY ANEMIA: Status: ACTIVE | Noted: 2018-06-27

## 2018-06-27 LAB
ANION GAP SERPL CALC-SCNC: 10 MMOL/L (ref 0–11.9)
BASOPHILS # BLD AUTO: 0.5 % (ref 0–1.8)
BASOPHILS # BLD: 0.08 K/UL (ref 0–0.12)
BUN SERPL-MCNC: 39 MG/DL (ref 8–22)
CALCIUM SERPL-MCNC: 9 MG/DL (ref 8.5–10.5)
CHLORIDE SERPL-SCNC: 102 MMOL/L (ref 96–112)
CO2 SERPL-SCNC: 27 MMOL/L (ref 20–33)
CREAT SERPL-MCNC: 0.66 MG/DL (ref 0.5–1.4)
EOSINOPHIL # BLD AUTO: 0.48 K/UL (ref 0–0.51)
EOSINOPHIL NFR BLD: 2.9 % (ref 0–6.9)
ERYTHROCYTE [DISTWIDTH] IN BLOOD BY AUTOMATED COUNT: 46.1 FL (ref 35.9–50)
GLUCOSE SERPL-MCNC: 104 MG/DL (ref 65–99)
HCT VFR BLD AUTO: 28.7 % (ref 42–52)
HGB BLD-MCNC: 9.2 G/DL (ref 14–18)
IMM GRANULOCYTES # BLD AUTO: 0.1 K/UL (ref 0–0.11)
IMM GRANULOCYTES NFR BLD AUTO: 0.6 % (ref 0–0.9)
LYMPHOCYTES # BLD AUTO: 2.52 K/UL (ref 1–4.8)
LYMPHOCYTES NFR BLD: 15.2 % (ref 22–41)
MCH RBC QN AUTO: 30.2 PG (ref 27–33)
MCHC RBC AUTO-ENTMCNC: 32.1 G/DL (ref 33.7–35.3)
MCV RBC AUTO: 94.1 FL (ref 81.4–97.8)
MONOCYTES # BLD AUTO: 1.29 K/UL (ref 0–0.85)
MONOCYTES NFR BLD AUTO: 7.8 % (ref 0–13.4)
NEUTROPHILS # BLD AUTO: 12.1 K/UL (ref 1.82–7.42)
NEUTROPHILS NFR BLD: 73 % (ref 44–72)
NRBC # BLD AUTO: 0 K/UL
NRBC BLD-RTO: 0 /100 WBC
PLATELET # BLD AUTO: 384 K/UL (ref 164–446)
PMV BLD AUTO: 11.9 FL (ref 9–12.9)
POTASSIUM SERPL-SCNC: 4.1 MMOL/L (ref 3.6–5.5)
RBC # BLD AUTO: 3.05 M/UL (ref 4.7–6.1)
SODIUM SERPL-SCNC: 139 MMOL/L (ref 135–145)
WBC # BLD AUTO: 16.6 K/UL (ref 4.8–10.8)

## 2018-06-27 PROCEDURE — A9270 NON-COVERED ITEM OR SERVICE: HCPCS | Performed by: SURGERY

## 2018-06-27 PROCEDURE — 700102 HCHG RX REV CODE 250 W/ 637 OVERRIDE(OP): Performed by: SURGERY

## 2018-06-27 PROCEDURE — 700111 HCHG RX REV CODE 636 W/ 250 OVERRIDE (IP): Performed by: SURGERY

## 2018-06-27 PROCEDURE — 700101 HCHG RX REV CODE 250: Performed by: SURGERY

## 2018-06-27 PROCEDURE — 700105 HCHG RX REV CODE 258: Performed by: PSYCHIATRY & NEUROLOGY

## 2018-06-27 PROCEDURE — 94640 AIRWAY INHALATION TREATMENT: CPT

## 2018-06-27 PROCEDURE — 700111 HCHG RX REV CODE 636 W/ 250 OVERRIDE (IP): Performed by: PSYCHIATRY & NEUROLOGY

## 2018-06-27 PROCEDURE — 71045 X-RAY EXAM CHEST 1 VIEW: CPT

## 2018-06-27 PROCEDURE — 97112 NEUROMUSCULAR REEDUCATION: CPT

## 2018-06-27 PROCEDURE — 770022 HCHG ROOM/CARE - ICU (200)

## 2018-06-27 PROCEDURE — 94003 VENT MGMT INPAT SUBQ DAY: CPT

## 2018-06-27 PROCEDURE — 97140 MANUAL THERAPY 1/> REGIONS: CPT

## 2018-06-27 PROCEDURE — 97530 THERAPEUTIC ACTIVITIES: CPT

## 2018-06-27 PROCEDURE — 700112 HCHG RX REV CODE 229: Performed by: SURGERY

## 2018-06-27 PROCEDURE — 94669 MECHANICAL CHEST WALL OSCILL: CPT

## 2018-06-27 PROCEDURE — 700111 HCHG RX REV CODE 636 W/ 250 OVERRIDE (IP): Performed by: INTERNAL MEDICINE

## 2018-06-27 PROCEDURE — 80048 BASIC METABOLIC PNL TOTAL CA: CPT

## 2018-06-27 PROCEDURE — 700105 HCHG RX REV CODE 258: Performed by: SURGERY

## 2018-06-27 PROCEDURE — 92507 TX SP LANG VOICE COMM INDIV: CPT

## 2018-06-27 PROCEDURE — 99291 CRITICAL CARE FIRST HOUR: CPT | Performed by: SURGERY

## 2018-06-27 RX ADMIN — DOCUSATE SODIUM 100 MG: 50 LIQUID ORAL at 20:16

## 2018-06-27 RX ADMIN — PROPRANOLOL HYDROCHLORIDE 10 MG: 10 TABLET ORAL at 20:16

## 2018-06-27 RX ADMIN — ENOXAPARIN SODIUM 30 MG: 100 INJECTION SUBCUTANEOUS at 20:16

## 2018-06-27 RX ADMIN — CEFEPIME 2 G: 2 INJECTION, POWDER, FOR SOLUTION INTRAVENOUS at 13:58

## 2018-06-27 RX ADMIN — OXYCODONE HYDROCHLORIDE 10 MG: 5 TABLET ORAL at 23:53

## 2018-06-27 RX ADMIN — CEFEPIME 2 G: 2 INJECTION, POWDER, FOR SOLUTION INTRAVENOUS at 04:54

## 2018-06-27 RX ADMIN — SODIUM BICARBONATE 3 ML: 84 INJECTION, SOLUTION INTRAVENOUS at 15:23

## 2018-06-27 RX ADMIN — RIFAXIMIN 550 MG: 550 TABLET ORAL at 08:55

## 2018-06-27 RX ADMIN — SODIUM BICARBONATE 3 ML: 84 INJECTION, SOLUTION INTRAVENOUS at 18:47

## 2018-06-27 RX ADMIN — ALBUTEROL SULFATE 2.5 MG: 2.5 SOLUTION RESPIRATORY (INHALATION) at 02:48

## 2018-06-27 RX ADMIN — SODIUM CHLORIDE 1000 MG: 9 INJECTION, SOLUTION INTRAVENOUS at 13:58

## 2018-06-27 RX ADMIN — ENOXAPARIN SODIUM 30 MG: 100 INJECTION SUBCUTANEOUS at 08:54

## 2018-06-27 RX ADMIN — OXYCODONE HYDROCHLORIDE 5 MG: 5 TABLET ORAL at 02:15

## 2018-06-27 RX ADMIN — SODIUM CHLORIDE 1000 MG: 9 INJECTION, SOLUTION INTRAVENOUS at 04:54

## 2018-06-27 RX ADMIN — SPIRONOLACTONE 50 MG: 50 TABLET ORAL at 04:55

## 2018-06-27 RX ADMIN — RIFAXIMIN 550 MG: 550 TABLET ORAL at 20:16

## 2018-06-27 RX ADMIN — CEFEPIME 2 G: 2 INJECTION, POWDER, FOR SOLUTION INTRAVENOUS at 21:09

## 2018-06-27 RX ADMIN — SODIUM BICARBONATE 3 ML: 84 INJECTION, SOLUTION INTRAVENOUS at 06:45

## 2018-06-27 RX ADMIN — FUROSEMIDE 40 MG: 10 INJECTION, SOLUTION INTRAMUSCULAR; INTRAVENOUS at 08:54

## 2018-06-27 RX ADMIN — SODIUM BICARBONATE 3 ML: 84 INJECTION, SOLUTION INTRAVENOUS at 22:36

## 2018-06-27 RX ADMIN — ALBUTEROL SULFATE 2.5 MG: 2.5 SOLUTION RESPIRATORY (INHALATION) at 22:36

## 2018-06-27 RX ADMIN — SENNOSIDES AND DOCUSATE SODIUM 1 TABLET: 8.6; 5 TABLET ORAL at 20:16

## 2018-06-27 RX ADMIN — DOCUSATE SODIUM 100 MG: 50 LIQUID ORAL at 08:54

## 2018-06-27 RX ADMIN — FAMOTIDINE 20 MG: 20 TABLET ORAL at 20:16

## 2018-06-27 RX ADMIN — PROPRANOLOL HYDROCHLORIDE 10 MG: 10 TABLET ORAL at 14:08

## 2018-06-27 RX ADMIN — MAGNESIUM HYDROXIDE 30 ML: 400 SUSPENSION ORAL at 08:54

## 2018-06-27 RX ADMIN — SPIRONOLACTONE 50 MG: 50 TABLET ORAL at 16:21

## 2018-06-27 RX ADMIN — IRON SUCROSE 200 MG: 20 INJECTION, SOLUTION INTRAVENOUS at 19:22

## 2018-06-27 RX ADMIN — ALBUTEROL SULFATE 2.5 MG: 2.5 SOLUTION RESPIRATORY (INHALATION) at 15:23

## 2018-06-27 RX ADMIN — AMLODIPINE BESYLATE 5 MG: 10 TABLET ORAL at 08:54

## 2018-06-27 RX ADMIN — SODIUM BICARBONATE 3 ML: 84 INJECTION, SOLUTION INTRAVENOUS at 02:48

## 2018-06-27 RX ADMIN — SODIUM BICARBONATE 3 ML: 84 INJECTION, SOLUTION INTRAVENOUS at 10:59

## 2018-06-27 RX ADMIN — PROPRANOLOL HYDROCHLORIDE 10 MG: 10 TABLET ORAL at 04:55

## 2018-06-27 RX ADMIN — ALBUTEROL SULFATE 2.5 MG: 2.5 SOLUTION RESPIRATORY (INHALATION) at 06:45

## 2018-06-27 RX ADMIN — ALBUTEROL SULFATE 2.5 MG: 2.5 SOLUTION RESPIRATORY (INHALATION) at 10:59

## 2018-06-27 RX ADMIN — SODIUM CHLORIDE 1000 MG: 9 INJECTION, SOLUTION INTRAVENOUS at 21:07

## 2018-06-27 RX ADMIN — FAMOTIDINE 20 MG: 20 TABLET ORAL at 08:54

## 2018-06-27 RX ADMIN — ALBUTEROL SULFATE 2.5 MG: 2.5 SOLUTION RESPIRATORY (INHALATION) at 18:45

## 2018-06-27 RX ADMIN — OXYCODONE HYDROCHLORIDE 5 MG: 5 TABLET ORAL at 16:21

## 2018-06-27 ASSESSMENT — COGNITIVE AND FUNCTIONAL STATUS - GENERAL
EATING MEALS: TOTAL
TURNING FROM BACK TO SIDE WHILE IN FLAT BAD: UNABLE
MOBILITY SCORE: 6
TOILETING: TOTAL
MOVING TO AND FROM BED TO CHAIR: UNABLE
SUGGESTED CMS G CODE MODIFIER DAILY ACTIVITY: CN
MOVING FROM LYING ON BACK TO SITTING ON SIDE OF FLAT BED: UNABLE
DRESSING REGULAR LOWER BODY CLOTHING: TOTAL
DAILY ACTIVITIY SCORE: 6
DRESSING REGULAR UPPER BODY CLOTHING: TOTAL
WALKING IN HOSPITAL ROOM: TOTAL
CLIMB 3 TO 5 STEPS WITH RAILING: TOTAL
STANDING UP FROM CHAIR USING ARMS: TOTAL
HELP NEEDED FOR BATHING: TOTAL
PERSONAL GROOMING: TOTAL
SUGGESTED CMS G CODE MODIFIER MOBILITY: CN

## 2018-06-27 ASSESSMENT — GAIT ASSESSMENTS: GAIT LEVEL OF ASSIST: UNABLE TO PARTICIPATE

## 2018-06-27 NOTE — THERAPY
"Speech Language Therapy dysphagia treatment completed.   Functional Status:  The patient was seen for voice therapy this date with use of speaking valve. The patient tolerated tracheal suctioning, cuff deflation and speaking valve placement with stable respiratory status (SpO2 92-95% on 10L FiO2 35%, HR 76-80, RR 19-22) although periods of increased coughing were once again noted, they subsided after the first 10minutes of speaking valve placement. The patient was noted to have strong vocal quality during single spontaneous sounds, and x1 verbalization of his daughter's name. Patient appeared to respond best to tactile stim to assist with breathing strategies, but was unable to follow directives for cued sounds or vocalizations. Patient had speaking valve on for 38 minutes without difficulty or change in vitals. Speaking valve was left in place with close monitoring by RN. At this time, recommend speaking valve be placed by trained staff as tolerated with close monitoring. SLP following. SLP requested cognitive-linguistic and clinical swallow evaluation orders.    Recommendations: 1) Okay for speaking valve to be placed by trained staff as tolerated with close monitoring.    Plan of Care: Will benefit from Speech Therapy 5 times per week    Post-Acute Therapy: Discharge to a transitional care facility for continued skilled therapy services.    See \"Rehab Therapy-Acute\" Patient Summary Report for complete documentation.     "

## 2018-06-27 NOTE — DISCHARGE PLANNING
Medical Social Work     JUJU spoke to pt's wife over the phone who requested that LTAC referrals be sent to one closer to home in [place], [place]. JUJU updated pt's wife that there were two near their home, [facility] in [place] and [facility] in [place]. Pt's wife would like a referral to be sent to both. JUJU completed choice and faxed to [facility], received completion.     Plan: LTAC pending m/c and acceptance from a facility.

## 2018-06-27 NOTE — WOUND TEAM
[facility]  Inpatient Services  Wound & Skin Care Progress Note.    Admission Date:  5/30/2018   HPI, PMH, SH: Reviewed  Unit where seen by Wound Team: S108/00    WOUND CONSULT RELATED TO: Follow up on DTI identified on 06/15/2018    SUBJECTIVE:   trached.    Self Report / Pain Level:no c/o pain      OBJECTIVE: on ARLEEN bed, heels floated off pillows, sacral mepilex inplace  WOUND TYPE, LOCATION, CHARACTERISTICS (Pressure ulcers: location, stage, POA or date identified)  Pressure Ulcer  Deep Tissue Injury  Buttock/perianal Left 6/15/18  Periwound Skin: Intact  Drainage : None       Tissue Type and %:    Purple 100%  Wound Edges:    Nlosed    Odor:     None   Exposed structure(s):   No   Signs and Symptoms of Infection: None     Measurements: 6/27/18  Length (cm):0.3  Width (cm): 1.6  Depth (cm):  (nm DTI)      Tracts/undermining: None     INTERVENTIONS BY WOUND TEAM: pt turned to L side, viewed and palpated wound, left MADAN. Sacral mepilex over sacrum. Wound has greatly decreased in size.   Dressing Options: Open to Air    Interdisciplinary consultation:   With Nursing;With Patient     EVALUATION: pt has DTI inside L buttock adjacent to anus    Factors affecting wound healing: decreased mobility, stroke, cirrhosis, HTN, trauma  Goals: DTI is resolving and will continue to do so. Prevent other pressure injuries.    NURSING PLAN OF CARE ORDERS (X):    Dressing changes: See Dressing Maintenance orders:   Skin care: See Skin Care orders: x  Rectal tube care: See Rectal Tube Care orders:   Other orders:    WOUND TEAM PLAN OF CARE (X):   NPWT change 3 x week:        Dressing changes by wound team:       Follow up as needed:   x    Other (explain):
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
## 2018-06-27 NOTE — CARE PLAN
Problem: Safety  Goal: Will remain free from falls  Outcome: PROGRESSING AS EXPECTED  Pt will remain free of falls during shift.    Problem: Knowledge Deficit  Goal: Knowledge of the prescribed therapeutic regimen will improve  Outcome: PROGRESSING SLOWER THAN EXPECTED  Pt will understand reason for hospitalization and treatment protocol.
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
## 2018-06-27 NOTE — PROGRESS NOTES
Pt had episode of emesis. 4mg PRN Zofran given. Dr. Quarles at bedside. No new orders at this time.

## 2018-06-27 NOTE — PROGRESS NOTES
S- nods he is well.  No other complaints.     O-   Allergies:   Allergies   Allergen Reactions   • Lyrica Unspecified     Chest pain         Current Facility-Administered Medications:   •  iron sucrose (VENOFER) injection 200 mg, 200 mg, Intravenous, DAILY, Vernon Quarles M.D., 200 mg at 06/26/18 1740  •  cefepime (MAXIPIME) 2 g in  mL IVPB, 2 g, Intravenous, Q8HRS, Vernon Quarles M.D., Stopped at 06/27/18 0524  •  levETIRAcetam (KEPPRA) 1,000 mg in  mL IVPB, 1,000 mg, Intravenous, Q8HRS, Francois Leung M.D., Stopped at 06/27/18 0509  •  albuterol (PROVENTIL) 2.5mg/0.5ml nebulizer solution 2.5 mg, 2.5 mg, Nebulization, Q4HRS (RT), Ambrocio Gregory M.D., 2.5 mg at 06/27/18 1059  •  LORazepam (ATIVAN) injection 2 mg, 2 mg, Intravenous, Q4HRS PRN, Ambrocio Gregory M.D., 2 mg at 06/26/18 0508  •  sodium bicarbonate 8.4 % injection 3 mL, 3 mL, Inhalation, Q4HRS (RT), Ambrocio Gregory M.D., 3 mL at 06/27/18 1059  •  furosemide (LASIX) injection 40 mg, 40 mg, Intravenous, Q DAY, Francisca Lafleur M.D., 40 mg at 06/27/18 0854  •  heparin injection 3,000 Units, 3,000 Units, Intravenous, PRN, Francisca Lafleur M.D., 3,000 Units at 06/18/18 1734  •  propranolol (INDERAL) tablet 10 mg, 10 mg, Per NG Tube, Q8HRS, Juan Hernandez M.D., 10 mg at 06/27/18 0455  •  spironolactone (ALDACTONE) tablet 50 mg, 50 mg, Per NG Tube, BID DIURETIC, Juan Hernandez M.D., 50 mg at 06/27/18 0455  •  amLODIPine (NORVASC) tablet 5 mg, 5 mg, Oral, Q DAY, Dmitry Covington M.D., 5 mg at 06/27/18 0854  •  labetalol (NORMODYNE,TRANDATE) injection 10 mg, 10 mg, Intravenous, Q4HRS PRN, Alexis Castillo D.O., 10 mg at 06/17/18 2014  •  riFAXIMin (XIFAXAN) tablet 550 mg, 550 mg, Oral, BID, Alexis Castillo D.O., 550 mg at 06/27/18 0855  •  docusate sodium 100mg/10mL (COLACE) solution 100 mg, 100 mg, Oral, BID, 100 mg at 06/27/18 0854 **OR** [DISCONTINUED] docusate sodium (COLACE) capsule 100 mg, 100 mg, Oral, BID, Stefany Terry M.D.  •   oxyCODONE immediate-release (ROXICODONE) tablet 5-15 mg, 5-15 mg, Oral, Q3HRS PRN, Chris Puente M.D., 5 mg at 06/27/18 0215  •  enoxaparin (LOVENOX) inj 30 mg, 30 mg, Subcutaneous, Q12HRS, Des Ramirez M.D., 30 mg at 06/27/18 0854  •  Respiratory Care per Protocol, , Nebulization, Continuous RT, Des Ramirez M.D.  •  ipratropium-albuterol (DUONEB) nebulizer solution, 3 mL, Nebulization, Q4H PRN (RT), Stefany Terry M.D., 3 mL at 06/07/18 0820  •  Pharmacy Consult Request ...Pain Management Review 1 Each, 1 Each, Other, PRN, Stefany Terry M.D.  •  senna-docusate (PERICOLACE or SENOKOT S) 8.6-50 MG per tablet 1 Tab, 1 Tab, Oral, Nightly, Stefany Terry M.D., 1 Tab at 06/26/18 1953  •  senna-docusate (PERICOLACE or SENOKOT S) 8.6-50 MG per tablet 1 Tab, 1 Tab, Oral, Q24HRS PRN, Stefany Terry M.D.  •  polyethylene glycol/lytes (MIRALAX) PACKET 1 Packet, 1 Packet, Oral, BID, Stefany Terry M.D., Stopped at 06/25/18 2100  •  magnesium hydroxide (MILK OF MAGNESIA) suspension 30 mL, 30 mL, Oral, DAILY, Stefany Terry M.D., 30 mL at 06/27/18 0854  •  bisacodyl (DULCOLAX) suppository 10 mg, 10 mg, Rectal, Q24HRS PRN, Stefany Terry M.D., 10 mg at 06/02/18 0957  •  fleet enema 133 mL, 1 Each, Rectal, Once PRN, Stefany Terry M.D.  •  famotidine (PEPCID) tablet 20 mg, 20 mg, Oral, BID, 20 mg at 06/27/18 0854 **OR** [DISCONTINUED] famotidine (PEPCID) injection 20 mg, 20 mg, Intravenous, BID, Stefany Terry M.D., 20 mg at 06/08/18 2120  •  ondansetron (ZOFRAN) syringe/vial injection 4 mg, 4 mg, Intravenous, Q4HRS PRN, Stefany Terry M.D., 4 mg at 06/26/18 2136  •  albuterol inhaler 2 Puff, 2 Puff, Inhalation, Q4HRS PRN, Stefany Terry M.D.      PHYSICAL EXAM    Vitals:    06/27/18 0400 06/27/18 0500 06/27/18 0600 06/27/18 1105   BP:       Pulse: 74 82 73 80   Resp: 16 (!) 32 (!) 37 (!) 23   Temp:       SpO2: 96% 97% 94% 98%   Weight: 96.3 kg (212 lb 4.9 oz)      Height:           Head/Neck: NCAT no  meningismus neg kernig neg brudzinski. No obvious mass or heard bruit. No tender arteries or lost pulses.  Skin: Warm, dry, intact. No rashes observed head/neck or body  Eyes/Funduscopic: unable     Mental Status: Awake, now speaking notes wife is jennifer. ROCA to command.  Cranial Nerves: PERRL 3mm.   No afferent pupillary defect. EOM full.  VF full. sym grimace & eye closure. No nystagmus.                Motor: less bulk, increased tone. Moves UE well to commands now biceps against gravity. LE mild Adduction to commands. no abn mvmts.  Sensory: no wthdrw to light pain  Coordination: n/a  DTR's: increasing, cross ad sign lower extrem spreads to ankle as does patellar bilat, brisk distal arm spreads to hands flexor   Gait/Station: n/a               Labs:  Recent Labs      06/25/18 0322 06/26/18 0454 06/26/18   2350   WBC  13.4*  13.9*  16.6*   RBC  2.64*  2.72*  3.05*   HEMOGLOBIN  8.1*  8.3*  9.2*   HEMATOCRIT  25.8*  25.9*  28.7*   MCV  97.7  95.2  94.1   MCH  30.7  30.5  30.2   MCHC  31.4*  32.0*  32.1*   RDW  51.8*  47.5  46.1   PLATELETCT  330  331  384   MPV  12.0  11.5  11.9     Recent Labs      06/25/18 0322 06/26/18 0454 06/27/18   0411   SODIUM  140  138  139   POTASSIUM  4.2  4.2  4.1   CHLORIDE  106  103  102   CO2  26 27 27   GLUCOSE  118*  122*  104*   BUN  49*  40*  39*   CREATININE  0.86  0.69  0.66   CALCIUM  8.6  8.9  9.0                     Recent Labs      06/25/18 0322 06/26/18 0454 06/27/18   0411   SODIUM  140  138  139   POTASSIUM  4.2  4.2  4.1   CHLORIDE  106  103  102   CO2  26 27 27   GLUCOSE  118*  122*  104*   BUN  49*  40*  39*     Recent Labs      06/25/18 0322 06/26/18 0454  06/27/18   0411   SODIUM  140  138  139   POTASSIUM  4.2  4.2  4.1   CHLORIDE  106  103  102   CO2  26  27  27   BUN  49*  40*  39*   CREATININE  0.86  0.69  0.66   MAGNESIUM  2.5   --    --    PHOSPHORUS  3.8   --    --    CALCIUM  8.6  8.9  9.0         Results for orders placed or  performed during the hospital encounter of 05/30/18   ECHOCARDIOGRAM COMP W/O CONT   Result Value Ref Range    Eject.Frac. MOD BP 69.48     Eject.Frac. MOD 4C 71     Eject.Frac. MOD 2C 59.71     Left Ventrical Ejection Fraction 70               Imaging: neuroimaging reviewed and directly visualized by me  DX-CHEST-PORTABLE (1 VIEW)   Final Result         1.  Pulmonary edema and/or infiltrates are identified, which are stable since the prior exam.         DX-CHEST-PORTABLE (1 VIEW)   Final Result         1.  Pulmonary edema and/or infiltrates are identified, which are stable since the prior exam.      MR-MRA NECK-W/O   Final Result      1. MRA OF THE CERVICAL VERTEBRAL AND CAROTID ARTERIES WITHIN NORMAL LIMITS WITH NO EVIDENCE OF FLOW LIMITING LESION.      2. TORTUOUS LOOPS IN THE UPPER CERVICAL RIGHT ICA AND LEFT ICA BELOW THE SKULL BASE.      MR-MRA HEAD-W/O   Final Result      1. MRA OF THE [place] WITHIN NORMAL LIMITS WITH NO EVIDENCE OF ANEURYSM OR CEREBROVASCULAR OCCLUSIVE DISEASE.      MR-BRAIN-W/O   Final Result      1.  Interval resolution of punctate focus of restricted diffusion at the left high posterior frontal lobe seen on 6/12/2018.   2.  Remainder the study is within normal limits. No new abnormalities are evident.      DX-CHEST-PORTABLE (1 VIEW)   Final Result         1.  Pulmonary edema and/or infiltrates are identified, which are stable since the prior exam.      DX-CHEST-PORTABLE (1 VIEW)   Final Result         1. No significant interval change.            DX-CHEST-PORTABLE (1 VIEW)   Final Result         1. No significant interval change.         DX-CHEST-PORTABLE (1 VIEW)   Final Result         1. No significant interval change.      DX-CHEST-PORTABLE (1 VIEW)   Final Result         1. No significant interval change.      DX-CHEST-LIMITED (1 VIEW)   Final Result      Dialysis catheter projects over the proximal SVC.      Perihilar and bibasilar airspace opacities, right greater than  left are again noted.      Pulmonary edema is likely present.      There may be a small right pleural effusion.      Multiple right-sided rib fractures and right clavicle fracture.      Cardiomegaly.            DX-ABDOMEN FOR TUBE PLACEMENT   Final Result      Enteric tube projects over the distal stomach.      DX-CHEST-PORTABLE (1 VIEW)   Final Result      Stable chest with right greater than left basilar atelectasis, probable right pleural fluid and hazy opacity compatible with contusion and/or edema      DX-CHEST-PORTABLE (1 VIEW)   Final Result      No significant change from prior exam.      DX-CHEST-PORTABLE (1 VIEW)   Final Result      1.  Worsening bilateral pulmonary infiltrate.   2.  No other significant change from prior exam.         DX-CHEST-PORTABLE (1 VIEW)   Final Result      Right internal jugular line tip overlies the SVC. No pneumothorax.   Bilateral perihilar/lung base atelectasis and edema and small right pleural effusion similar to recent findings.      DX-CHEST-PORTABLE (1 VIEW)   Final Result      No significant change from prior exam.      DX-CHEST-PORTABLE (1 VIEW)   Final Result         1.  Pulmonary edema and/or infiltrates are identified, which appear somewhat increased since the prior exam.   2.  Trace layering right pleural effusion   3.  Cardiomegaly   4.  Comminuted right clavicular fracture                     DX-CHEST-PORTABLE (1 VIEW)   Final Result         1.  Pulmonary edema and/or infiltrates are identified, which are stable since the prior exam.   2.  Cardiomegaly   3.  Comminuted right clavicular fracture                  DX-CHEST-PORTABLE (1 VIEW)   Final Result         1.  Pulmonary edema and/or infiltrates are identified, which are stable since the prior exam.   2.  Cardiomegaly   3.  Comminuted right clavicular fracture               ECHOCARDIOGRAM COMP W/O CONT   Final Result      MR-THORACIC SPINE-W/O   Final Result      1.  Multilevel degenerative change of thoracic  spine.   2.  No gross abnormal signal within the thoracic cord to indicate edema or infarct.   3.  No epidural hematoma demonstrated.      MR-CERVICAL SPINE-W/O   Final Result      1.  Limited exam showing no focal abnormal signal within the cervical cord to indicate acute infarct.   2.  Multilevel degenerative disc disease with mild disc bulging at C3-4, C4-5, C5-6 and C6-7 as seen previously.      DX-CHEST-PORTABLE (1 VIEW)   Final Result         1.  Pulmonary edema and/or infiltrates are identified, which are stable since the prior exam.   2.  Cardiomegaly            DX-CHEST-PORTABLE (1 VIEW)   Final Result         1.  Pulmonary edema and/or infiltrates are identified, which are stable since the prior exam.   2.  Cardiomegaly   3.  Right rib fractures         MR-BRAIN-W/O   Final Result   Addendum 1 of 1   These findings were discussed with YEFRI FRAZIER on 6/12/2018 2:08 PM.      Final      1.  Punctate acute subcortical infarcts in the posterior frontal regions bilaterally, potentially embolic.   2.  No evidence for intracranial hemorrhage.   3.  Mild diffuse atrophy.   4.  Bilateral mastoid fluid, likely inflammatory.   5.  Acute and chronic paranasal sinus inflammatory changes.      MR-CERVICAL SPINE-W/O   Final Result      1.  Multilevel degenerative changes cervical spine with minimal reversal of curvature.   2.  Multilevel posterior disc bulging without evidence for cervical cord edema or myelopathy.   3.  Subtle findings raising concern for injury to the posterior longitudinal ligament at the C6-7 level with possible disruption of the disc as well.   4.  No epidural hematoma demonstrated.      DX-CHEST-PORTABLE (1 VIEW)   Final Result         1.  Pulmonary edema and/or infiltrates are identified, which are stable since the prior exam.   2.  Cardiomegaly   3.  Right rib fractures      NM-BILIARY (HIDA) SCAN WITH CCK   Final Result      Delayed activity within the gallbladder is nonspecific, possibly  chronic cholecystitis in the appropriate clinical setting.      [id number] VIEW   Final Result      1. Air-filled colon without significant distention.      2. Feeding tube tip projects over the duodenum.      TRAUMA-LE VENOUS SCREENING   Final Result      DX-CHEST-PORTABLE (1 VIEW)   Final Result         1.  Pulmonary edema and/or infiltrates are identified, which are stable since the prior exam.   2.  Cardiomegaly      US-GALLBLADDER   Final Result      Cholelithiasis with mild gallbladder wall thickening and trace pericholecystic fluid. Findings can be seen in acute cholecystitis in the correct clinical setting. Gallbladder wall thickening can also be seen in hepatitis, cirrhosis, hypoproteinemia.      Enlarged, heterogeneous and echogenic appearance of the liver can be seen in hepatic steatosis or hepatocellular disease.      Limited evaluation of the pancreas and aorta which are obscured.         CT-CHEST,ABDOMEN,PELVIS W/O   Final Result      Small bilateral pleural effusions with overlying atelectasis/consolidation, right greater than left. Foci of air are seen within the pleural fluid on the right which may be related to the presence of the chest tube but can be seen in the setting of an    empyema. Cavitary consolidation is not excluded.      Patchy and ground glass opacities bilaterally are likely infectious/inflammatory.      No pneumothorax.      Mildly prominent mediastinal lymph nodes likely reactive.      Small amount of free fluid in the abdomen and pelvis.      Density within the gallbladder may represent vicarious excretion of contrast versus sludge. There is pericholecystic fluid. Further evaluation can be obtained with right upper quadrant ultrasound.      There appears to be mild duodenal wall thickening which may be in can be seen in duodenitis or peptic ulcer disease.      Possible punctate nonobstructing left renal calculus.      Colonic diverticulosis.      Bladder is decompressed by a  Roberts catheter. Bladder wall thickening not excluded. Correlation with urinalysis recommended.      Third spacing.      Multisegmented right-sided rib fractures.      Comminuted right clavicle fracture.      Splenomegaly.         DX-CHEST-PORTABLE (1 VIEW)   Final Result      Improving bibasilar atelectasis.      DX-CHEST-PORTABLE (1 VIEW)   Final Result      Bilateral airspace opacities are not significantly changed compared to prior.      Small pleural effusions are not excluded.      No pneumothorax is identified.      Right clavicle fracture and right-sided rib fractures are again noted.         CT-HEAD W/O   Final Result         1. No evidence of acute intracranial hemorrhage or mass lesion.      2. New complete opacification of the bilateral mastoid air cells. New air-fluid level in the sphenoid sinuses. Correlate for infection.         DX-CHEST-PORTABLE (1 VIEW)   Final Result      Stable chest findings compared to 6/8.      DX-CHEST-PORTABLE (1 VIEW)   Final Result      Stable chest appearance compared with 6/7.      DX-CHEST-PORTABLE (1 VIEW)   Final Result      Worsening bilateral airspace opacities.      DX-ABDOMEN FOR TUBE PLACEMENT   Final Result      1.  NG tube and feeding tube as described above.      DX-CHEST-PORTABLE (1 VIEW)   Final Result         1. Worsening left lower lung opacities could relate to worsening atelectasis, edema or infection.      DX-CHEST-PORTABLE (1 VIEW)   Final Result         1. No significant interval change.      DX-SMALL BOWEL SERIES   Final Result      No radiographic evidence of bowel obstruction      Prolonged contrast transit time to the colon indicates ileus      Findings were discussed with CARMEN KING on 6/4/2018 6:10 PM.      US-ABDOMEN COMPLETE SURVEY   Final Result      1.  Cholelithiasis   2.  Splenomegaly   3.  Enlarged portal vein   4.  These findings suggest portal hypertension   5.  Subcentimeter LEFT hepatic cyst   6.  Echogenic liver, a nonspecific  finding that often is found to represent steatosis.  Other infiltrative processes could have an identical appearance.         ECHOCARDIOGRAM-COMP W/ CONT   Final Result      DX-CHEST-PORTABLE (1 VIEW)   Final Result         1. No significant interval change.      DX-CHEST-PORTABLE (1 VIEW)   Final Result         1.  Pulmonary edema and/or infiltrates are identified, which are stable since the prior exam.   2.  Decreased soft tissue gas in the right chest wall and neck.   3.  Right rib fractures   4.  Cardiomegaly            DX-CHEST-PORTABLE (1 VIEW)   Final Result      Right subclavian catheter placement with the tip projecting over the superior vena cava. No pneumothorax is identified. Exam is otherwise unchanged.      DX-CHEST-PORTABLE (1 VIEW)   Final Result         1.  Pulmonary edema and/or infiltrates are identified, which are stable since the prior exam.   2.  Decreased soft tissue gas in the right chest wall and neck.   3.  Right rib fractures   4.  Cardiomegaly         DX-CHEST-PORTABLE (1 VIEW)   Final Result      1.  Interval placement of RIGHT chest tube.   2.  No other significant change from prior exam.         DX-CHEST-PORTABLE (1 VIEW)   Final Result      1.  Endotracheal tube and enteric catheter has been placed and appear appropriately located      2.  Bilateral atelectasis and/or pulmonary contusion      3.  Right chest wall air      4.  Right rib fracture      DX-CHEST-PORTABLE (1 VIEW)   Final Result         1.  Pulmonary edema and/or infiltrates are identified, which appear somewhat increased since the prior exam.   2.  Stable soft tissue gas in the right chest wall and neck.   3.  Right rib fractures      DX-CHEST-PORTABLE (1 VIEW)   Final Result         1.  Pulmonary edema and/or infiltrates are identified, which appear somewhat increased since the prior exam.   2.  Increased soft tissue gas in the right chest wall and neck.   3.  Right rib fractures      CT-CHEST,ABDOMEN,PELVIS WITH   Final  Result      1.  Multiple right rib fractures including multisegmented fractures and displaced fourth through sixth rib fractures.   2.  Small hemopneumothorax.   3.  Atelectasis and or contusions within the right lung and within the left lower lobe.   4.  Comminuted angulated fracture of the distal right clavicle incompletely imaged.   5.  Right scapula is incompletely imaged.   6.  Aorta appears intact. No mediastinal or retroperitoneal hematoma.   7.  No intra-abdominal solid organ injury identified.   8.  Diverticulosis of the colon.   9.  No free fluid or free air.   10.  Hepatic steatosis and mild splenomegaly.   Findings were discussed with YANY CISNEROS on 5/30/2018 4:22 PM.      CT-LSPINE W/O PLUS RECONS   Final Result      Degenerative changes as above described.      Hepatic steatosis.      Colonic diverticulosis.      Small right hemothorax with overlying atelectasis/contusion.      CT-TSPINE W/O PLUS RECONS   Final Result      Minute right pneumothorax.      Small right hemothorax overlying atelectasis/contusion. Ground glass opacities in the right upper lobe likely represent small pulmonary contusions.      Soft tissue air in the posterior right hemithorax and right supraclavicular region.      Multiple right-sided rib fractures.      Degenerative changes in the thoracic spine.         CT-CSPINE WITHOUT PLUS RECONS   Final Result      Multilevel degenerative changes in the cervical spine.      Comminuted fractures of the right first, second and third ribs.      Patchy groundglass opacity at the right lung apex may represent a contusion.      Soft tissue air in the right supraclavicular region and posterior right hemithorax.      Air-fluid level in the left maxillary sinus can be seen in acute sinusitis.      CT-HEAD W/O   Final Result      No acute intracranial abnormality is identified.      Paranasal sinus disease.      Frontal soft tissue swelling.         DX-CHEST-LIMITED (1 VIEW)   Final Result       1.  Multiple right rib fractures and possible right clavicle and scapular fractures.   2.  Possible trace right pneumothorax.   3.  Left lung base atelectasis.   Findings were discussed with YANY CISNEROS on 5/30/2018 3:36 PM.      DX-SHOULDER 2+ RIGHT   Final Result         1.  Normal shoulder series.      2.  Fractures of the right fourth through sixth ribs laterally.      EEG    INTERPRETATION:           ________________________________________________________________________     This is abnormal routine video EEG recording in the awake and drowsy/sleep state(s).     This scalp EEG denotes                  1. 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  EMG/NCV diagnostic suggests axonal variant  CSF protein elevated, cells not elevated- consistent with cytoalbuminologic dissociation of GBS  PMR, marked improvement now meaningfully moving UE and following commands  May require second perhaps truncated round of PLEX at 1wk post last PLEX 5D course completed  May require IVIG course/s thereafter        ENCEPHALOPATHY, SUSPECT GBS/BICKERSTAFF ENCEPHALITIS  Ophthalmoplegia resolving  Hyperreflexia increasingly evident   Sensorium clearing  Punctate lesions on MRI brain could be due to same, or ischemic stroke in setting of trauma/acute hospitalization  GQ1B ANTIBODY SERUM SENT 6/25 (TEST CODE [facility] [id number])  On Keppra, EEG abnormal but no epileptiform- do not suspect sz but will keep prophylaxis for now       Will follow periodically             Kashmir Rush M.D.  , Neurohospitalist & Stroke  Clinical Professor, [employer]  Diplomate, Neurology & Neuroimaging

## 2018-06-27 NOTE — THERAPY
"Physical Therapy Treatment completed.   Bed Mobility:  Supine to Sit: Total Assist X 2  Transfers: Sit to Stand: Unable to Participate  Gait: Level Of Assist: Unable to Participate with No Equipment Needed       Plan of Care: Will benefit from Physical Therapy 3 times per week  Discharge Recommendations: Equipment: Will Continue to Assess for Equipment Needs.     Significant improvement compared to previous day. Pt much more alert and is able to actively participate with therapies. With verbal/tactile stimuli will look at therapist, however usually quickly averts gaze. Difficult to discern if this is due to attention or is an avoidance tactic as pt tends to look away when being asked to perform difficult tasks. Continues to have difficulty weight bearing B LEs into ground in seated position, input facilitated by therapist. R foot continues to demonstrate PF and clonus response. Overall significant improvement in core activation and pt was able to momentarily hold himself EOB. D1 flexion PNF to B LE when supine. Pt actively assisted on R LE consistently with each rep, minimal participation on L. Normalized tone on R LE compared to previous day, however when coughing does assume flexion and IR of hip. Seated balance training focusing on anterior weight shift with activation of rhomboids and errector spinae musculature. Also weight shift R/L (min to mod assist, more difficult shifting to R). Pt actively firing core musculature with all weight shift tasks. Frequent breaks required due to quick fatigue. No moving of LEs on command when EOB, however demonstrated movement with neuro tasks supine and when moving lines/boots (asked him to lift leg, evident muscle contraction). Due to this improvement in ability to participate his frequency will be increased  back to 3x/week. Continue to recommend post acute rehabiliation.     See \"Rehab Therapy-Acute\" Patient Summary Report for complete documentation.       "

## 2018-06-27 NOTE — THERAPY
"Occupational Therapy Treatment completed with focus on ADLs, ADL transfers, caregiver training, cognition and upper extremity function.  Functional Status:  Observed pt being more purpose and alert this am, followed up w/PM session to re-assess. Total assist x2 supine>sit EOB Pt was more purposefully moving BUE, reaching LUE hand to face to scratch forehead, looking when his name was called, w/speaking valve on pt said \"hi, yes and attempted to say his name\". Sat EOB ~20 min, demonstrating more intentional postural changes w/forward lean and weight shift. Remains w/impaired motor planning and cognition, but has much more purposeful and intentional movements today, hands initiating closed grasp on command, and noted movement when asked to give thumbs up. Total assist x2 sit>supine. Pt has movement of BUE, but strength remains poor. Pts daughter was present at bedside through out session. Also placed 1 piece of kineso tape on L shoulder, sublux was improved from previous session. RN aware of session and pts efforts   Plan of Care: Will benefit from Occupational Therapy 3 times per week  Discharge Recommendations:  Equipment Will Continue to Assess for Equipment Needs. Post-acute therapy Discharge to a transitional care facility for continued skilled therapy services.    See \"Rehab Therapy-Acute\" Patient Summary Report for complete documentation.   "

## 2018-06-27 NOTE — CARE PLAN
Problem: Communication  Goal: The ability to communicate needs accurately and effectively will improve    Intervention: Develop alternate methods of communication with patient and significant other/support system  Patient trial performed with speaking valve, and orientation questions asked at that time. Patient reoriented as needed. Patient asked yes or no questions, and requested to communicate using head motions per his motor abilities at this point.       Problem: Knowledge Deficit  Goal: Knowledge of disease process/condition, treatment plan, diagnostic tests, and medications will improve    Intervention: Explain information regarding disease process/condition, treatment plan, diagnostic tests, and medications and document in education  Patient and daughter informed about medications, vital signs, and significance of neurological changes/improvements. All questions answered within scope.

## 2018-06-27 NOTE — CARE PLAN
Problem: Ventilation Defect:  Goal: Ability to achieve and maintain unassisted ventilation or tolerate decreased levels of ventilator support    Intervention: Support and monitor invasive and noninvasive mechanical ventilation  Adult Ventilation Update    Total Vent Days: 6    Patient Lines/Drains/Airways Status    Active Airway     Name: Placement date: Placement time: Site: Days:    Airway Group Portex Trach Tracheostomy 8.0 06/07/18   1055   Tracheostomy   21              (ASV) % MIN VOL: 140 (06/27/18 1436)  ASV Inspiratory Pressures- 12  % Spontaneous- varies, usually 100     Sputum/Suction   Cough: Productive (06/27/18 1526)  Sputum Amount: Small (06/27/18 1526)  Sputum Color: Yellow (06/27/18 1526)  Sputum Consistency: Thick (06/27/18 1526)    Mobility  Activity Performed: Edge of bed (06/27/18 0600)  Time Activity Tolerated: 5 min (06/27/18 0600)    Events/Summary/Plan: PT placed back on vent after 8 hours on t-piece. PT stated he was tired and sob. Tolerated PT/OT and pvm today (06/27/18 1436)

## 2018-06-27 NOTE — CARE PLAN
Problem: Ventilation Defect:  Goal: Ability to achieve and maintain unassisted ventilation or tolerate decreased levels of ventilator support    Intervention: Support and monitor invasive and noninvasive mechanical ventilation  Adult Ventilation Update    Total Vent Days: 5    Patient Lines/Drains/Airways Status    Active Airway     Name: Placement date: Placement time: Site: Days:    Airway Group Portex Trach Tracheostomy 8.0 06/07/18   1055   Tracheostomy   20              Sputum/Suction   Cough: Productive;Moist;Strong (06/26/18 1600)  Sputum Amount: Moderate (06/26/18 1600)  Sputum Color: White;Yellow (06/26/18 1600)  Sputum Consistency: Thick (06/26/18 1600)    Mobility  Level of Mobility: Level III (06/26/18 1000)  Activity Performed: Edge of bed (06/26/18 1000)    Events/Summary/Plan: PT was placed on t-piece at 0915. Per order- vent at night. IPV qid (06/26/18 7250)

## 2018-06-27 NOTE — DISCHARGE PLANNING
Received Choice form at 1215  Agency/Facility Name: [facility]  Referral sent per Choice form at 1230  Obtained by THONY Arreaga.

## 2018-06-27 NOTE — CARE PLAN
Problem: Ventilation Defect:  Goal: Ability to achieve and maintain unassisted ventilation or tolerate decreased levels of ventilator support    Intervention: Manage ventilation therapy by monitoring diagnostic test results  Adult Ventilation Update    Total Vent Days: 11  Trach Days: 6    Patient Lines/Drains/Airways Status    Active Airway     Name: Placement date: Placement time: Site: Days:    Airway Group Portex Trach Tracheostomy 8.0 06/07/18   1055   Tracheostomy   19              In the last 24 hours, the patient tolerated SBT for 12 hours on t-piece at 40%.    #FVC / Vital Capacity (liters) : 1.1 (forced, pt did not follow) (06/17/18 0842)  NIF (cm H2O) : -23 (forced, pt did not follow) (06/17/18 0842)  Rapid Shallow Breathing Index (RR/VT): 38 (06/17/18 0842)  Plateau Pressure (Q Shift): 13 (06/25/18 2025)  Static Compliance (ml / cm H2O): 153 (06/25/18 2025)    Patient failed trials because of Barriers to Wean: FiO2 >60% or PEEP >10 CM H2O (06/10/18 0656)  Barriers to SBT Weaning Trial Stopped due to:: Pt weaned for 1 hour and returned to rest settings per protocol (06/25/18 2025)  Length of Weaning Trial Length of Weaning Trial (Hours): 12 hours on t- piece (06/26/18 2106)    6 day post trache.  The patient is currently in slow wean according to protocol.    (ASV) % MIN VOL: 120 (06/26/18 2106)    Sputum/Suction   Cough: Congested;Productive;Strong (06/27/18 0000)  Sputum Amount: Moderate (06/27/18 0000)  Sputum Color: Tan (06/27/18 0000)  Sputum Consistency: Thick (06/27/18 0000)    Mobility  Level of Mobility: Level III (06/26/18 2000)  Activity Performed: Edge of bed (06/26/18 1000)  Time Activity Tolerated: 10 min (06/26/18 1000)  Distance Per Occurrence (ft.): 0 feet (06/26/18 1000)  # of Times Distance was Traveled: 0 (06/26/18 1000)  Assistance / Tolerance: Assistance of Two or More;Tolerates Well (06/26/18 1000)  Pt Calls for Assistance: No (06/26/18 1000)  Staff Present for Mobilization: RN;PT  (06/26/18 1000)  Gait: Unable to Ambulate (06/26/18 1000)  Assistive Devices: Hand held assist (06/26/18 1000)  Reason Not Mobilized: Unstable condition (patient very lethargic) (06/24/18 2200)  Mobilization Comments: High vent settings.  (06/13/18 2000)    Events/Summary/Plan: Pt. placed back on ventilator to rest (06/26/18 2106)

## 2018-06-28 ENCOUNTER — APPOINTMENT (OUTPATIENT)
Dept: RADIOLOGY | Facility: MEDICAL CENTER | Age: 63
DRG: 003 | End: 2018-06-28
Attending: SURGERY
Payer: COMMERCIAL

## 2018-06-28 LAB
ANION GAP SERPL CALC-SCNC: 7 MMOL/L (ref 0–11.9)
BASOPHILS # BLD AUTO: 0.7 % (ref 0–1.8)
BASOPHILS # BLD: 0.1 K/UL (ref 0–0.12)
BUN SERPL-MCNC: 38 MG/DL (ref 8–22)
CALCIUM SERPL-MCNC: 9.1 MG/DL (ref 8.5–10.5)
CHLORIDE SERPL-SCNC: 102 MMOL/L (ref 96–112)
CO2 SERPL-SCNC: 28 MMOL/L (ref 20–33)
CREAT SERPL-MCNC: 0.61 MG/DL (ref 0.5–1.4)
EOSINOPHIL # BLD AUTO: 0.42 K/UL (ref 0–0.51)
EOSINOPHIL NFR BLD: 3 % (ref 0–6.9)
ERYTHROCYTE [DISTWIDTH] IN BLOOD BY AUTOMATED COUNT: 44.6 FL (ref 35.9–50)
GLUCOSE SERPL-MCNC: 116 MG/DL (ref 65–99)
HCT VFR BLD AUTO: 26.9 % (ref 42–52)
HGB BLD-MCNC: 8.7 G/DL (ref 14–18)
IMM GRANULOCYTES # BLD AUTO: 0.12 K/UL (ref 0–0.11)
IMM GRANULOCYTES NFR BLD AUTO: 0.9 % (ref 0–0.9)
LYMPHOCYTES # BLD AUTO: 2.88 K/UL (ref 1–4.8)
LYMPHOCYTES NFR BLD: 20.8 % (ref 22–41)
MAGNESIUM SERPL-MCNC: 2.4 MG/DL (ref 1.5–2.5)
MCH RBC QN AUTO: 30.2 PG (ref 27–33)
MCHC RBC AUTO-ENTMCNC: 32.3 G/DL (ref 33.7–35.3)
MCV RBC AUTO: 93.4 FL (ref 81.4–97.8)
MONOCYTES # BLD AUTO: 1.22 K/UL (ref 0–0.85)
MONOCYTES NFR BLD AUTO: 8.8 % (ref 0–13.4)
NEUTROPHILS # BLD AUTO: 9.08 K/UL (ref 1.82–7.42)
NEUTROPHILS NFR BLD: 65.8 % (ref 44–72)
NRBC # BLD AUTO: 0 K/UL
NRBC BLD-RTO: 0 /100 WBC
PHOSPHATE SERPL-MCNC: 2.8 MG/DL (ref 2.5–4.5)
PLATELET # BLD AUTO: 365 K/UL (ref 164–446)
PMV BLD AUTO: 11.2 FL (ref 9–12.9)
POTASSIUM SERPL-SCNC: 3.8 MMOL/L (ref 3.6–5.5)
RBC # BLD AUTO: 2.88 M/UL (ref 4.7–6.1)
SODIUM SERPL-SCNC: 137 MMOL/L (ref 135–145)
WBC # BLD AUTO: 13.8 K/UL (ref 4.8–10.8)

## 2018-06-28 PROCEDURE — 94640 AIRWAY INHALATION TREATMENT: CPT

## 2018-06-28 PROCEDURE — A9270 NON-COVERED ITEM OR SERVICE: HCPCS | Performed by: SURGERY

## 2018-06-28 PROCEDURE — 700102 HCHG RX REV CODE 250 W/ 637 OVERRIDE(OP): Performed by: SURGERY

## 2018-06-28 PROCEDURE — 94003 VENT MGMT INPAT SUBQ DAY: CPT

## 2018-06-28 PROCEDURE — 700111 HCHG RX REV CODE 636 W/ 250 OVERRIDE (IP): Performed by: PSYCHIATRY & NEUROLOGY

## 2018-06-28 PROCEDURE — 700101 HCHG RX REV CODE 250: Performed by: SURGERY

## 2018-06-28 PROCEDURE — 770022 HCHG ROOM/CARE - ICU (200)

## 2018-06-28 PROCEDURE — 97112 NEUROMUSCULAR REEDUCATION: CPT

## 2018-06-28 PROCEDURE — 700105 HCHG RX REV CODE 258: Performed by: PSYCHIATRY & NEUROLOGY

## 2018-06-28 PROCEDURE — 84100 ASSAY OF PHOSPHORUS: CPT

## 2018-06-28 PROCEDURE — 85025 COMPLETE CBC W/AUTO DIFF WBC: CPT

## 2018-06-28 PROCEDURE — 700105 HCHG RX REV CODE 258: Performed by: SURGERY

## 2018-06-28 PROCEDURE — 94669 MECHANICAL CHEST WALL OSCILL: CPT

## 2018-06-28 PROCEDURE — 71045 X-RAY EXAM CHEST 1 VIEW: CPT

## 2018-06-28 PROCEDURE — 80048 BASIC METABOLIC PNL TOTAL CA: CPT

## 2018-06-28 PROCEDURE — 700112 HCHG RX REV CODE 229: Performed by: SURGERY

## 2018-06-28 PROCEDURE — 99291 CRITICAL CARE FIRST HOUR: CPT | Performed by: SURGERY

## 2018-06-28 PROCEDURE — 97535 SELF CARE MNGMENT TRAINING: CPT

## 2018-06-28 PROCEDURE — 700111 HCHG RX REV CODE 636 W/ 250 OVERRIDE (IP): Performed by: INTERNAL MEDICINE

## 2018-06-28 PROCEDURE — 97530 THERAPEUTIC ACTIVITIES: CPT

## 2018-06-28 PROCEDURE — 700111 HCHG RX REV CODE 636 W/ 250 OVERRIDE (IP): Performed by: SURGERY

## 2018-06-28 PROCEDURE — 83735 ASSAY OF MAGNESIUM: CPT

## 2018-06-28 RX ORDER — LEVETIRACETAM 500 MG/1
1000 TABLET ORAL EVERY 8 HOURS
Status: DISCONTINUED | OUTPATIENT
Start: 2018-06-28 | End: 2018-07-04 | Stop reason: HOSPADM

## 2018-06-28 RX ADMIN — SODIUM CHLORIDE 1000 MG: 9 INJECTION, SOLUTION INTRAVENOUS at 05:05

## 2018-06-28 RX ADMIN — SODIUM BICARBONATE 3 ML: 84 INJECTION, SOLUTION INTRAVENOUS at 18:53

## 2018-06-28 RX ADMIN — PROPRANOLOL HYDROCHLORIDE 10 MG: 10 TABLET ORAL at 21:16

## 2018-06-28 RX ADMIN — SODIUM BICARBONATE 3 ML: 84 INJECTION, SOLUTION INTRAVENOUS at 06:45

## 2018-06-28 RX ADMIN — IPRATROPIUM BROMIDE AND ALBUTEROL SULFATE 3 ML: .5; 3 SOLUTION RESPIRATORY (INHALATION) at 06:45

## 2018-06-28 RX ADMIN — IRON SUCROSE 200 MG: 20 INJECTION, SOLUTION INTRAVENOUS at 16:20

## 2018-06-28 RX ADMIN — SODIUM BICARBONATE 3 ML: 84 INJECTION, SOLUTION INTRAVENOUS at 11:39

## 2018-06-28 RX ADMIN — ENOXAPARIN SODIUM 30 MG: 100 INJECTION SUBCUTANEOUS at 08:26

## 2018-06-28 RX ADMIN — SPIRONOLACTONE 50 MG: 50 TABLET ORAL at 05:04

## 2018-06-28 RX ADMIN — SPIRONOLACTONE 50 MG: 50 TABLET ORAL at 16:12

## 2018-06-28 RX ADMIN — ALBUTEROL SULFATE 2.5 MG: 2.5 SOLUTION RESPIRATORY (INHALATION) at 11:38

## 2018-06-28 RX ADMIN — AMLODIPINE BESYLATE 5 MG: 10 TABLET ORAL at 08:26

## 2018-06-28 RX ADMIN — SENNOSIDES AND DOCUSATE SODIUM 1 TABLET: 8.6; 5 TABLET ORAL at 21:15

## 2018-06-28 RX ADMIN — RIFAXIMIN 550 MG: 550 TABLET ORAL at 21:15

## 2018-06-28 RX ADMIN — SODIUM BICARBONATE 3 ML: 84 INJECTION, SOLUTION INTRAVENOUS at 21:59

## 2018-06-28 RX ADMIN — SODIUM BICARBONATE 3 ML: 84 INJECTION, SOLUTION INTRAVENOUS at 03:02

## 2018-06-28 RX ADMIN — FAMOTIDINE 20 MG: 20 TABLET ORAL at 08:26

## 2018-06-28 RX ADMIN — SODIUM BICARBONATE 3 ML: 84 INJECTION, SOLUTION INTRAVENOUS at 14:47

## 2018-06-28 RX ADMIN — OXYCODONE HYDROCHLORIDE 10 MG: 5 TABLET ORAL at 03:11

## 2018-06-28 RX ADMIN — PROPRANOLOL HYDROCHLORIDE 10 MG: 10 TABLET ORAL at 05:04

## 2018-06-28 RX ADMIN — ENOXAPARIN SODIUM 30 MG: 100 INJECTION SUBCUTANEOUS at 21:15

## 2018-06-28 RX ADMIN — CEFEPIME 2 G: 2 INJECTION, POWDER, FOR SOLUTION INTRAVENOUS at 05:05

## 2018-06-28 RX ADMIN — POLYETHYLENE GLYCOL 3350 1 PACKET: 17 POWDER, FOR SOLUTION ORAL at 21:15

## 2018-06-28 RX ADMIN — FUROSEMIDE 40 MG: 10 INJECTION, SOLUTION INTRAMUSCULAR; INTRAVENOUS at 08:26

## 2018-06-28 RX ADMIN — LEVETIRACETAM 1000 MG: 500 TABLET ORAL at 21:15

## 2018-06-28 RX ADMIN — PROPRANOLOL HYDROCHLORIDE 10 MG: 10 TABLET ORAL at 13:53

## 2018-06-28 RX ADMIN — CEFEPIME 2 G: 2 INJECTION, POWDER, FOR SOLUTION INTRAVENOUS at 13:53

## 2018-06-28 RX ADMIN — ALBUTEROL SULFATE 2.5 MG: 2.5 SOLUTION RESPIRATORY (INHALATION) at 21:58

## 2018-06-28 RX ADMIN — CEFEPIME 2 G: 2 INJECTION, POWDER, FOR SOLUTION INTRAVENOUS at 22:03

## 2018-06-28 RX ADMIN — RIFAXIMIN 550 MG: 550 TABLET ORAL at 08:27

## 2018-06-28 RX ADMIN — FAMOTIDINE 20 MG: 20 TABLET ORAL at 21:16

## 2018-06-28 RX ADMIN — LEVETIRACETAM 1000 MG: 500 TABLET ORAL at 16:11

## 2018-06-28 RX ADMIN — ALBUTEROL SULFATE 2.5 MG: 2.5 SOLUTION RESPIRATORY (INHALATION) at 18:53

## 2018-06-28 RX ADMIN — ALBUTEROL SULFATE 2.5 MG: 2.5 SOLUTION RESPIRATORY (INHALATION) at 14:46

## 2018-06-28 RX ADMIN — ALBUTEROL SULFATE 2.5 MG: 2.5 SOLUTION RESPIRATORY (INHALATION) at 03:02

## 2018-06-28 RX ADMIN — DOCUSATE SODIUM 100 MG: 50 LIQUID ORAL at 21:15

## 2018-06-28 ASSESSMENT — COGNITIVE AND FUNCTIONAL STATUS - GENERAL
TOILETING: TOTAL
DRESSING REGULAR LOWER BODY CLOTHING: TOTAL
HELP NEEDED FOR BATHING: TOTAL
CLIMB 3 TO 5 STEPS WITH RAILING: TOTAL
DRESSING REGULAR UPPER BODY CLOTHING: TOTAL
PERSONAL GROOMING: TOTAL
MOVING TO AND FROM BED TO CHAIR: UNABLE
MOBILITY SCORE: 6
SUGGESTED CMS G CODE MODIFIER DAILY ACTIVITY: CN
MOVING FROM LYING ON BACK TO SITTING ON SIDE OF FLAT BED: UNABLE
SUGGESTED CMS G CODE MODIFIER MOBILITY: CN
DAILY ACTIVITIY SCORE: 6
STANDING UP FROM CHAIR USING ARMS: TOTAL
EATING MEALS: TOTAL
WALKING IN HOSPITAL ROOM: TOTAL
TURNING FROM BACK TO SIDE WHILE IN FLAT BAD: UNABLE

## 2018-06-28 ASSESSMENT — GAIT ASSESSMENTS: GAIT LEVEL OF ASSIST: UNABLE TO PARTICIPATE

## 2018-06-28 NOTE — CARE PLAN
Problem: Communication  Goal: The ability to communicate needs accurately and effectively will improve  Providing frequent reorientation to patient; collaborating with speech therapy; keeping patient and family updated to plan of care.     Problem: Safety  Goal: Will remain free from falls  Fall precautions in place.

## 2018-06-28 NOTE — CARE PLAN
Problem: Ventilation Defect:  Goal: Ability to achieve and maintain unassisted ventilation or tolerate decreased levels of ventilator support    Intervention: Support and monitor invasive and noninvasive mechanical ventilation  Adult Ventilation Update    Total Vent Days: 7    Patient Lines/Drains/Airways Status    Active Airway     Name: Placement date: Placement time: Site: Days:    Airway Group Portex Trach Tracheostomy 8.0 06/07/18   1055   Tracheostomy   22                Plateau Pressure (Q Shift): 16 (06/27/18 2236)  Static Compliance (ml / cm H2O): 107 (06/28/18 0307)    23 day post trache.     (ASV) % MIN VOL: 120 (06/28/18 0307)    Sputum/Suction yes  Cough: Productive (06/28/18 0307)  Sputum Amount: Small (06/28/18 0307)  Sputum Color: Tan;White (06/28/18 0307)  Sputum Consistency: Thin;Thick (06/28/18 0307)    Events/Summary/Plan: Pt stable on vent. Continue to appropriately titrate %minVol.        Problem: Bronchoconstriction:  Goal: Improve in air movement and diminished wheezing  Outcome: PROGRESSING AS EXPECTED  Pt is on duoneb Q4

## 2018-06-28 NOTE — CARE PLAN
Problem: Knowledge Deficit  Goal: Knowledge of the prescribed therapeutic regimen will improve    Intervention: Discuss information regarding therpeutic regimen and document in education  Pt will begin to understand condition and treatment.      Problem: Skin Integrity  Goal: Risk for impaired skin integrity will decrease    Intervention: Assess risk factors for impaired skin integrity and/or pressure ulcers  Risk factors for impaired skin integrity will be assessed.

## 2018-06-28 NOTE — THERAPY
"Occupational Therapy Treatment completed with focus on ADLs, ADL transfers, cognition and upper extremity function.  Functional Status:  Total assist x2 supine>sit EOB, max A w/sitting EOB, although does have lateral push/lean to left w/alternating forward and posterior lean, inconsistently to command. Continues to have lateral and posterior head turning. Looks when his name is called and does respond yes w/nodding and occasionally mouthing/verbalizing, appears to have significantly delayed processing, as pt is able to follow 1 step commands but not back to back, takes at least a minute to process a follow up command. Purposefully moving LUE hand to face and chest both in supine and in sitting. Provided pt w/wash cloth and pt did initiate bringing the cloth to his face, still requires max A for full grooming participation. Also able to follow command to give \"high five\" w/LUE, less command following w/RUE. Completed further grooming w/using a shower cap and combing hair. Sat Eob ~25 min, did appear to becoming more restless and fatigued and did verbalized \"yes\" when asking if he wanted to lay down. Total assist sit>supine BTB. RN aware and present at end of session   Plan of Care: Will benefit from Occupational Therapy 3 times per week  Discharge Recommendations:  Equipment Will Continue to Assess for Equipment Needs. Post-acute therapy Discharge to a transitional care facility for continued skilled therapy services.    See \"Rehab Therapy-Acute\" Patient Summary Report for complete documentation.   "

## 2018-06-28 NOTE — DISCHARGE PLANNING
Medical Social Work    Ongoing: SW received tc from Christophe ([phone]) with [employer] who stated that pt has an HMO insurance and has a medical network of [facility]. Since pt has a medical network, all authorizations need to go through them. Christophe stated that she was going to reach out to [facility] and provide them with pt's medical network information so they can begin the authorization process.    [facility]  Ph- [phone]  Jva-[phone]    Plan: Waiting to hear back from pt's medical network on authorization for pt to go [facility].

## 2018-06-28 NOTE — THERAPY
"Physical Therapy Treatment completed.   Bed Mobility:  Supine to Sit: Total Assist X 2  Transfers: Sit to Stand: Unable to Participate  Gait: Level Of Assist: Unable to Participate with No Equipment Needed       Plan of Care: Will benefit from Physical Therapy 3 times per week  Discharge Recommendations: Equipment: Will Continue to Assess for Equipment Needs.     Pt continues to increase in arousability and internally motivated movements of UEs/LEs. Appears to now be fidgiting in bed, movements not all tone based. Pt able to demonstrate active quad contraction to assist in lifting his LEs up off pillow. When EOB appears to be over stimulated easily. Will follow first command given, then appears to be demonstrating avoidance behavior by looking away. Performed toe curls and hip flexion x1 on command, then no further LE command following. Continues to demonstrate trunk activation and is able to initiate anterior weight shift, however continues to defer to left posterior lean when not cued. Despite patient's improvements in extremity movement when supine it has not translated to functional improvements. Continues to be total assist for all mobility. Continue to recommend post acute rehabiliation. Acute PT to continue to follow.     See \"Rehab Therapy-Acute\" Patient Summary Report for complete documentation.       "

## 2018-06-28 NOTE — CARE PLAN
Problem: Ventilation Defect:  Goal: Ability to achieve and maintain unassisted ventilation or tolerate decreased levels of ventilator support  Outcome: PROGRESSING AS EXPECTED  Adult Ventilation Update    Total Vent Days: 7  Trach day: 22    Patient Lines/Drains/Airways Status    Active Airway     Name: Placement date: Placement time: Site: Days:    Airway Group Portex Trach Tracheostomy 8.0 06/07/18   1055   Tracheostomy   21              In the last 24 hours, the patient tolerated t-piece since the beginning of shift. Pt started speaking valve at 1530    #FVC / Vital Capacity (liters) : 1.1 (forced, pt did not follow) (06/17/18 0842)  NIF (cm H2O) : -23 (forced, pt did not follow) (06/17/18 0842)  Rapid Shallow Breathing Index (RR/VT): 38 (06/17/18 0842)  Plateau Pressure (Q Shift): 16 (06/27/18 2236)  Static Compliance (ml / cm H2O): 107 (06/28/18 0307)    Patient failed trials because of Barriers to Wean: FiO2 >60% or PEEP >10 CM H2O (06/10/18 0656)  Barriers to SBT Weaning Trial Stopped due to:: Pt weaned for 1 hour and returned to rest settings per protocol (06/25/18 2025)  Length of Weaning Trial Length of Weaning Trial (Hours): 12 hours on t- piece (06/26/18 2106)    Cough: Productive (06/28/18 1449)  Sputum Amount: Moderate (06/28/18 1449)  Sputum Color: Tan (06/28/18 1449)  Sputum Consistency: Thick (06/28/18 1449)    Mobility  Level of Mobility: Level IV (06/28/18 0000)  Activity Performed: Edge of bed (06/28/18 1200)  Time Activity Tolerated: 25 min (06/28/18 1200)  Distance Per Occurrence (ft.): 0 feet (06/27/18 1000)  # of Times Distance was Traveled: 0 (06/27/18 1000)  Assistance / Tolerance: Assistance of Two or More;Tolerates Well (06/28/18 1200)  Pt Calls for Assistance: No (06/28/18 1200)  Staff Present for Mobilization: PT;OT (06/28/18 1200)  Gait: Unable to Ambulate (06/28/18 0000)  Assistive Devices: Hand held assist (06/27/18 1000)  Reason Not Mobilized: Unstable condition (patient very  lethargic) (06/24/18 2200)  Mobilization Comments: High vent settings.  (06/13/18 2000)    Events/Summary/Plan: pt placed on speaking valve (06/28/18 9655)

## 2018-06-28 NOTE — DISCHARGE PLANNING
Medical Social Work     JUJU spoke to Omayra ([phone]) with [facility] in [place], [place] who stated they have clinically accepted pt but needed to begin working on insurance authorization.     JUJU updated pt's wife, she will be in town this afternoon. JUJU attempted to call pt's RN CM for insurance listed in previous notes and both numbers are not valid.     JUJU will attempt to contact CM at [facility] to assist with authorization process.     Plan: Pt's d/c plan is to [facility] in [place] pending insurance authorization.
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
## 2018-06-29 ENCOUNTER — APPOINTMENT (OUTPATIENT)
Dept: RADIOLOGY | Facility: MEDICAL CENTER | Age: 63
DRG: 003 | End: 2018-06-29
Attending: SURGERY
Payer: COMMERCIAL

## 2018-06-29 LAB
ANION GAP SERPL CALC-SCNC: 7 MMOL/L (ref 0–11.9)
BACTERIA BLD CULT: NORMAL
BACTERIA BLD CULT: NORMAL
BASOPHILS # BLD AUTO: 0.7 % (ref 0–1.8)
BASOPHILS # BLD: 0.1 K/UL (ref 0–0.12)
BUN SERPL-MCNC: 41 MG/DL (ref 8–22)
CALCIUM SERPL-MCNC: 9 MG/DL (ref 8.5–10.5)
CHLORIDE SERPL-SCNC: 100 MMOL/L (ref 96–112)
CO2 SERPL-SCNC: 30 MMOL/L (ref 20–33)
CREAT SERPL-MCNC: 0.69 MG/DL (ref 0.5–1.4)
EOSINOPHIL # BLD AUTO: 0.4 K/UL (ref 0–0.51)
EOSINOPHIL NFR BLD: 2.9 % (ref 0–6.9)
ERYTHROCYTE [DISTWIDTH] IN BLOOD BY AUTOMATED COUNT: 44.6 FL (ref 35.9–50)
GLUCOSE SERPL-MCNC: 114 MG/DL (ref 65–99)
HCT VFR BLD AUTO: 28 % (ref 42–52)
HGB BLD-MCNC: 9 G/DL (ref 14–18)
IMM GRANULOCYTES # BLD AUTO: 0.15 K/UL (ref 0–0.11)
IMM GRANULOCYTES NFR BLD AUTO: 1.1 % (ref 0–0.9)
LYMPHOCYTES # BLD AUTO: 3.18 K/UL (ref 1–4.8)
LYMPHOCYTES NFR BLD: 23.2 % (ref 22–41)
MCH RBC QN AUTO: 29.9 PG (ref 27–33)
MCHC RBC AUTO-ENTMCNC: 32.1 G/DL (ref 33.7–35.3)
MCV RBC AUTO: 93 FL (ref 81.4–97.8)
MONOCYTES # BLD AUTO: 1.27 K/UL (ref 0–0.85)
MONOCYTES NFR BLD AUTO: 9.3 % (ref 0–13.4)
NEUTROPHILS # BLD AUTO: 8.61 K/UL (ref 1.82–7.42)
NEUTROPHILS NFR BLD: 62.8 % (ref 44–72)
NRBC # BLD AUTO: 0 K/UL
NRBC BLD-RTO: 0 /100 WBC
PLATELET # BLD AUTO: 388 K/UL (ref 164–446)
PMV BLD AUTO: 11.5 FL (ref 9–12.9)
POTASSIUM SERPL-SCNC: 3.7 MMOL/L (ref 3.6–5.5)
RBC # BLD AUTO: 3.01 M/UL (ref 4.7–6.1)
SIGNIFICANT IND 70042: NORMAL
SIGNIFICANT IND 70042: NORMAL
SITE SITE: NORMAL
SITE SITE: NORMAL
SODIUM SERPL-SCNC: 137 MMOL/L (ref 135–145)
SOURCE SOURCE: NORMAL
SOURCE SOURCE: NORMAL
TEST NAME 95000: NORMAL
WBC # BLD AUTO: 13.7 K/UL (ref 4.8–10.8)

## 2018-06-29 PROCEDURE — 700102 HCHG RX REV CODE 250 W/ 637 OVERRIDE(OP): Performed by: SURGERY

## 2018-06-29 PROCEDURE — 99291 CRITICAL CARE FIRST HOUR: CPT | Performed by: SURGERY

## 2018-06-29 PROCEDURE — 94640 AIRWAY INHALATION TREATMENT: CPT

## 2018-06-29 PROCEDURE — A9270 NON-COVERED ITEM OR SERVICE: HCPCS | Performed by: SURGERY

## 2018-06-29 PROCEDURE — 85025 COMPLETE CBC W/AUTO DIFF WBC: CPT

## 2018-06-29 PROCEDURE — 700112 HCHG RX REV CODE 229: Performed by: SURGERY

## 2018-06-29 PROCEDURE — 700111 HCHG RX REV CODE 636 W/ 250 OVERRIDE (IP): Performed by: SURGERY

## 2018-06-29 PROCEDURE — 92610 EVALUATE SWALLOWING FUNCTION: CPT

## 2018-06-29 PROCEDURE — 770022 HCHG ROOM/CARE - ICU (200)

## 2018-06-29 PROCEDURE — 80048 BASIC METABOLIC PNL TOTAL CA: CPT

## 2018-06-29 PROCEDURE — 700101 HCHG RX REV CODE 250: Performed by: SURGERY

## 2018-06-29 PROCEDURE — G9171 VOICE CURRENT STATUS: HCPCS | Mod: CJ

## 2018-06-29 PROCEDURE — 71045 X-RAY EXAM CHEST 1 VIEW: CPT

## 2018-06-29 PROCEDURE — G9172 VOICE GOAL STATUS: HCPCS | Mod: CH

## 2018-06-29 PROCEDURE — 82784 ASSAY IGA/IGD/IGG/IGM EACH: CPT

## 2018-06-29 PROCEDURE — 700111 HCHG RX REV CODE 636 W/ 250 OVERRIDE (IP): Performed by: INTERNAL MEDICINE

## 2018-06-29 PROCEDURE — 94669 MECHANICAL CHEST WALL OSCILL: CPT

## 2018-06-29 PROCEDURE — 700105 HCHG RX REV CODE 258: Performed by: SURGERY

## 2018-06-29 RX ADMIN — POLYETHYLENE GLYCOL 3350 1 PACKET: 17 POWDER, FOR SOLUTION ORAL at 08:03

## 2018-06-29 RX ADMIN — LORAZEPAM 2 MG: 2 INJECTION INTRAMUSCULAR; INTRAVENOUS at 03:36

## 2018-06-29 RX ADMIN — LORAZEPAM 2 MG: 2 INJECTION INTRAMUSCULAR; INTRAVENOUS at 23:34

## 2018-06-29 RX ADMIN — ENOXAPARIN SODIUM 30 MG: 100 INJECTION SUBCUTANEOUS at 08:03

## 2018-06-29 RX ADMIN — ENOXAPARIN SODIUM 30 MG: 100 INJECTION SUBCUTANEOUS at 21:54

## 2018-06-29 RX ADMIN — CEFEPIME 2 G: 2 INJECTION, POWDER, FOR SOLUTION INTRAVENOUS at 21:54

## 2018-06-29 RX ADMIN — IRON SUCROSE 200 MG: 20 INJECTION, SOLUTION INTRAVENOUS at 16:02

## 2018-06-29 RX ADMIN — DOCUSATE SODIUM 100 MG: 50 LIQUID ORAL at 08:02

## 2018-06-29 RX ADMIN — RIFAXIMIN 550 MG: 550 TABLET ORAL at 08:02

## 2018-06-29 RX ADMIN — LORAZEPAM 2 MG: 2 INJECTION INTRAMUSCULAR; INTRAVENOUS at 17:22

## 2018-06-29 RX ADMIN — SODIUM BICARBONATE 3 ML: 84 INJECTION, SOLUTION INTRAVENOUS at 02:36

## 2018-06-29 RX ADMIN — PROPRANOLOL HYDROCHLORIDE 10 MG: 10 TABLET ORAL at 15:20

## 2018-06-29 RX ADMIN — SODIUM BICARBONATE 3 ML: 84 INJECTION, SOLUTION INTRAVENOUS at 06:41

## 2018-06-29 RX ADMIN — SODIUM BICARBONATE 3 ML: 84 INJECTION, SOLUTION INTRAVENOUS at 19:14

## 2018-06-29 RX ADMIN — FAMOTIDINE 20 MG: 20 TABLET ORAL at 08:02

## 2018-06-29 RX ADMIN — SODIUM BICARBONATE 3 ML: 84 INJECTION, SOLUTION INTRAVENOUS at 14:30

## 2018-06-29 RX ADMIN — SPIRONOLACTONE 50 MG: 50 TABLET ORAL at 16:00

## 2018-06-29 RX ADMIN — CEFEPIME 2 G: 2 INJECTION, POWDER, FOR SOLUTION INTRAVENOUS at 05:30

## 2018-06-29 RX ADMIN — ALBUTEROL SULFATE 2.5 MG: 2.5 SOLUTION RESPIRATORY (INHALATION) at 06:41

## 2018-06-29 RX ADMIN — SODIUM BICARBONATE 3 ML: 84 INJECTION, SOLUTION INTRAVENOUS at 23:00

## 2018-06-29 RX ADMIN — FUROSEMIDE 40 MG: 10 INJECTION, SOLUTION INTRAMUSCULAR; INTRAVENOUS at 08:05

## 2018-06-29 RX ADMIN — SODIUM BICARBONATE 3 ML: 84 INJECTION, SOLUTION INTRAVENOUS at 09:03

## 2018-06-29 RX ADMIN — LEVETIRACETAM 1000 MG: 500 TABLET ORAL at 05:34

## 2018-06-29 RX ADMIN — DOCUSATE SODIUM 100 MG: 50 LIQUID ORAL at 21:56

## 2018-06-29 RX ADMIN — FAMOTIDINE 20 MG: 20 TABLET ORAL at 21:55

## 2018-06-29 RX ADMIN — AMLODIPINE BESYLATE 5 MG: 10 TABLET ORAL at 08:02

## 2018-06-29 RX ADMIN — ALBUTEROL SULFATE 2.5 MG: 2.5 SOLUTION RESPIRATORY (INHALATION) at 23:00

## 2018-06-29 RX ADMIN — ALBUTEROL SULFATE 2.5 MG: 2.5 SOLUTION RESPIRATORY (INHALATION) at 14:30

## 2018-06-29 RX ADMIN — PROPRANOLOL HYDROCHLORIDE 10 MG: 10 TABLET ORAL at 21:57

## 2018-06-29 RX ADMIN — ALBUTEROL SULFATE 2.5 MG: 2.5 SOLUTION RESPIRATORY (INHALATION) at 09:03

## 2018-06-29 RX ADMIN — LEVETIRACETAM 1000 MG: 500 TABLET ORAL at 21:55

## 2018-06-29 RX ADMIN — SENNOSIDES AND DOCUSATE SODIUM 1 TABLET: 8.6; 5 TABLET ORAL at 21:57

## 2018-06-29 RX ADMIN — LEVETIRACETAM 1000 MG: 500 TABLET ORAL at 15:59

## 2018-06-29 RX ADMIN — POLYETHYLENE GLYCOL 3350 1 PACKET: 17 POWDER, FOR SOLUTION ORAL at 21:56

## 2018-06-29 RX ADMIN — ALBUTEROL SULFATE 2.5 MG: 2.5 SOLUTION RESPIRATORY (INHALATION) at 19:14

## 2018-06-29 RX ADMIN — OXYCODONE HYDROCHLORIDE 15 MG: 5 TABLET ORAL at 00:21

## 2018-06-29 RX ADMIN — ALBUTEROL SULFATE 2.5 MG: 2.5 SOLUTION RESPIRATORY (INHALATION) at 02:37

## 2018-06-29 RX ADMIN — RIFAXIMIN 550 MG: 550 TABLET ORAL at 21:56

## 2018-06-29 RX ADMIN — CEFEPIME 2 G: 2 INJECTION, POWDER, FOR SOLUTION INTRAVENOUS at 15:59

## 2018-06-29 ASSESSMENT — PAIN SCALES - GENERAL
PAINLEVEL_OUTOF10: ASSUMED PAIN PRESENT

## 2018-06-29 ASSESSMENT — PATIENT HEALTH QUESTIONNAIRE - PHQ9
SUM OF ALL RESPONSES TO PHQ9 QUESTIONS 1 AND 2: 0
2. FEELING DOWN, DEPRESSED, IRRITABLE, OR HOPELESS: NOT AT ALL
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
## 2018-06-29 NOTE — CARE PLAN
Problem: Communication  Goal: The ability to communicate needs accurately and effectively will improve    Intervention: Develop alternate methods of communication with patient and significant other/support system  Patient able to respond to yes/no questions

## 2018-06-29 NOTE — DISCHARGE PLANNING
Received call from Baylee and [facility]. Per Baylee she is not the person to call for LTACH auth. Contact is Christophe @ [facility] @ [phone]. DOLLY with [facility] @[phone].

## 2018-06-29 NOTE — CARE PLAN
Problem: Ventilation Defect:  Goal: Ability to achieve and maintain unassisted ventilation or tolerate decreased levels of ventilator support  Outcome: PROGRESSING AS EXPECTED  Adult Ventilation Update    Total Vent Days: 8  Trach day: 23    Patient Lines/Drains/Airways Status    Active Airway     Name: Placement date: Placement time: Site: Days:    Airway Group Portex Trach Tracheostomy 8.0 06/07/18   1055   Tracheostomy   22              In the last 24 hours, the patient tolerated t-piece 10L/40% for 12 hours and speaking valve for 1 hour.    #FVC / Vital Capacity (liters) : 1.1 (forced, pt did not follow) (06/17/18 0842)  NIF (cm H2O) : -23 (forced, pt did not follow) (06/17/18 0842)  Rapid Shallow Breathing Index (RR/VT): 38 (06/17/18 0842)  Plateau Pressure (Q Shift): 16 (06/27/18 2236)  Static Compliance (ml / cm H2O): 107 (06/28/18 0307)    Patient failed trials because of Barriers to Wean: FiO2 >60% or PEEP >10 CM H2O (06/10/18 0656)  Barriers to SBT Weaning Trial Stopped due to:: Pt weaned for 1 hour and returned to rest settings per protocol (06/25/18 2025)  Length of Weaning Trial Length of Weaning Trial (Hours): 12.5 hours on t-piece (06/28/18 1929)    Cough: Productive (06/29/18 1432)  Sputum Amount: Moderate (06/29/18 1432)  Sputum Color: Tan;Yellow (06/29/18 1432)  Sputum Consistency: Thick (06/29/18 1432)    Mobility  Level of Mobility: Level II (06/28/18 2000)  Activity Performed: Cardiac chair (06/29/18 1400)  Time Activity Tolerated: 5 min (06/28/18 2000)  Distance Per Occurrence (ft.): 0 feet (06/27/18 1000)  # of Times Distance was Traveled: 0 (06/27/18 1000)  Assistance / Tolerance: Assistance of Two or More (06/28/18 2000)  Pt Calls for Assistance: No (06/28/18 2000)  Staff Present for Mobilization: RN (06/28/18 2000)  Gait: Unable to Ambulate (06/28/18 0000)  Assistive Devices: Hand held assist (06/27/18 1000)  Reason Not Mobilized: Unstable condition (patient very lethargic) (06/24/18  9880)  Mobilization Comments: High vent settings.  (06/13/18 2000)    Events/Summary/Plan: aerosol check (06/29/18 1439)

## 2018-06-29 NOTE — CARE PLAN
Problem: Safety  Goal: Will remain free from injury  Bed in low locked position, room near nurses station, bed alarm in use      Problem: Skin Integrity  Goal: Risk for impaired skin integrity will decrease  Q 2 hour turning in place, pillows or support and postioning. mepilex in use, following active wound order.

## 2018-06-29 NOTE — PROGRESS NOTES
S- NODS TO DOIN BETTER?.  No other complaints.     O-   Allergies:   Allergies   Allergen Reactions   • Lyrica Unspecified     Chest pain         Current Facility-Administered Medications:   •  levETIRAcetam (KEPPRA) tablet 1,000 mg, 1,000 mg, Oral, Q8HRS, Vernon Quarles M.D., 1,000 mg at 06/29/18 0534  •  iron sucrose (VENOFER) injection 200 mg, 200 mg, Intravenous, DAILY, Vernon Quarles M.D., 200 mg at 06/28/18 1620  •  cefepime (MAXIPIME) 2 g in  mL IVPB, 2 g, Intravenous, Q8HRS, Vernon Quarles M.D., Stopped at 06/29/18 0600  •  albuterol (PROVENTIL) 2.5mg/0.5ml nebulizer solution 2.5 mg, 2.5 mg, Nebulization, Q4HRS (RT), Ambrocio Gregory M.D., 2.5 mg at 06/29/18 1430  •  LORazepam (ATIVAN) injection 2 mg, 2 mg, Intravenous, Q4HRS PRN, Ambrocio Gregory M.D., 2 mg at 06/29/18 0336  •  sodium bicarbonate 8.4 % injection 3 mL, 3 mL, Inhalation, Q4HRS (RT), Ambrocio Gregory M.D., 3 mL at 06/29/18 1430  •  furosemide (LASIX) injection 40 mg, 40 mg, Intravenous, Q DAY, Francisca Lafleur M.D., 40 mg at 06/29/18 0805  •  heparin injection 3,000 Units, 3,000 Units, Intravenous, PRN, Francisca Lafleur M.D., 3,000 Units at 06/18/18 1734  •  propranolol (INDERAL) tablet 10 mg, 10 mg, Per NG Tube, Q8HRS, Juan Hernandez M.D., 10 mg at 06/28/18 2116  •  spironolactone (ALDACTONE) tablet 50 mg, 50 mg, Per NG Tube, BID DIURETIC, Juan Hernandez M.D., 50 mg at 06/28/18 1612  •  amLODIPine (NORVASC) tablet 5 mg, 5 mg, Oral, Q DAY, Dmitry Covington M.D., 5 mg at 06/29/18 0802  •  labetalol (NORMODYNE,TRANDATE) injection 10 mg, 10 mg, Intravenous, Q4HRS PRN, SABA Marie.O., 10 mg at 06/17/18 2014  •  riFAXIMin (XIFAXAN) tablet 550 mg, 550 mg, Oral, BID, Alexis Castillo D.O., 550 mg at 06/29/18 0802  •  docusate sodium 100mg/10mL (COLACE) solution 100 mg, 100 mg, Oral, BID, 100 mg at 06/29/18 0802 **OR** [DISCONTINUED] docusate sodium (COLACE) capsule 100 mg, 100 mg, Oral, BID, Stefany Terry M.D.  •   oxyCODONE immediate-release (ROXICODONE) tablet 5-15 mg, 5-15 mg, Oral, Q3HRS PRN, Chris Puente M.D., 15 mg at 06/29/18 0021  •  enoxaparin (LOVENOX) inj 30 mg, 30 mg, Subcutaneous, Q12HRS, Des Ramirez M.D., 30 mg at 06/29/18 0803  •  Respiratory Care per Protocol, , Nebulization, Continuous RT, Des Ramirez M.D.  •  ipratropium-albuterol (DUONEB) nebulizer solution, 3 mL, Nebulization, Q4H PRN (RT), Stefany Terry M.D., Stopped at 06/29/18 0236  •  Pharmacy Consult Request ...Pain Management Review 1 Each, 1 Each, Other, PRN, Stefany Terry M.D.  •  senna-docusate (PERICOLACE or SENOKOT S) 8.6-50 MG per tablet 1 Tab, 1 Tab, Oral, Nightly, Stefany Terry M.D., 1 Tab at 06/28/18 2115  •  senna-docusate (PERICOLACE or SENOKOT S) 8.6-50 MG per tablet 1 Tab, 1 Tab, Oral, Q24HRS PRN, Stefany Terry M.D.  •  polyethylene glycol/lytes (MIRALAX) PACKET 1 Packet, 1 Packet, Oral, BID, Stefany Terry M.D., 1 Packet at 06/29/18 0803  •  magnesium hydroxide (MILK OF MAGNESIA) suspension 30 mL, 30 mL, Oral, DAILY, Stefany Terry M.D., Stopped at 06/28/18 0900  •  bisacodyl (DULCOLAX) suppository 10 mg, 10 mg, Rectal, Q24HRS PRN, Stefany Terry M.D., 10 mg at 06/02/18 0957  •  fleet enema 133 mL, 1 Each, Rectal, Once PRN, Stefany Terry M.D.  •  famotidine (PEPCID) tablet 20 mg, 20 mg, Oral, BID, 20 mg at 06/29/18 0802 **OR** [DISCONTINUED] famotidine (PEPCID) injection 20 mg, 20 mg, Intravenous, BID, Stefany Terry M.D., 20 mg at 06/08/18 2120  •  ondansetron (ZOFRAN) syringe/vial injection 4 mg, 4 mg, Intravenous, Q4HRS PRN, Stefany Terry M.D., 4 mg at 06/26/18 2136  •  albuterol inhaler 2 Puff, 2 Puff, Inhalation, Q4HRS PRN, Stefany Terry M.D.      PHYSICAL EXAM    Vitals:    06/29/18 1200 06/29/18 1300 06/29/18 1400 06/29/18 1432   BP:       Pulse: 83 83 84 81   Resp: (!) 23 16 19 (!) 49   Temp: 36.9 °C (98.5 °F)  37.1 °C (98.7 °F)    SpO2: 95% 96% 97% 98%   Weight:       Height:                   Head/Neck: NCAT no meningismus neg kernig neg brudzinski. No obvious mass or heard bruit. No tender arteries or lost pulses.  Skin: Warm, dry, intact. No rashes observed head/neck or body  Eyes/Funduscopic: unable     Mental Status: sleepy, now speaking notes wife is jennifer. ROCA to command.  Cranial Nerves: PERRL 3mm.   No afferent pupillary defect. EOM full.  VF full. sym grimace & eye closure. No nystagmus.                Motor: less bulk, increased tone. Moves all extremities now spon and to command. no abn mvmts.  Sensory: now wthdrw to light pain  Coordination: n/a  DTR's: increasing, cross ad sign lower extrem spreads to ankle as does patellar bilat, brisk distal arm spreads to hands flexor   Gait/Station: n/a               Labs:  Recent Labs      06/26/18   2350  06/28/18   0400  06/29/18   0450   WBC  16.6*  13.8*  13.7*   RBC  3.05*  2.88*  3.01*   HEMOGLOBIN  9.2*  8.7*  9.0*   HEMATOCRIT  28.7*  26.9*  28.0*   MCV  94.1  93.4  93.0   MCH  30.2  30.2  29.9   MCHC  32.1*  32.3*  32.1*   RDW  46.1  44.6  44.6   PLATELETCT  384  365  388   MPV  11.9  11.2  11.5     Recent Labs      06/27/18 0411 06/28/18   0400  06/29/18   0450   SODIUM  139  137  137   POTASSIUM  4.1  3.8  3.7   CHLORIDE  102  102  100   CO2  27 28  30   GLUCOSE  104*  116*  114*   BUN  39*  38*  41*   CREATININE  0.66  0.61  0.69   CALCIUM  9.0  9.1  9.0                     Recent Labs      06/27/18   0411 06/28/18   0400  06/29/18   0450   SODIUM  139  137  137   POTASSIUM  4.1  3.8  3.7   CHLORIDE  102  102  100   CO2  27 28  30   GLUCOSE  104*  116*  114*   BUN  39*  38*  41*     Recent Labs      06/27/18   0411  06/28/18   0400  06/29/18   0450   SODIUM  139  137  137   POTASSIUM  4.1  3.8  3.7   CHLORIDE  102  102  100   CO2  27  28  30   BUN  39*  38*  41*   CREATININE  0.66  0.61  0.69   MAGNESIUM   --   2.4   --    PHOSPHORUS   --   2.8   --    CALCIUM  9.0  9.1  9.0         Results for orders placed or performed  during the hospital encounter of 05/30/18   ECHOCARDIOGRAM COMP W/O CONT   Result Value Ref Range    Eject.Frac. MOD BP 69.48     Eject.Frac. MOD 4C 71     Eject.Frac. MOD 2C 59.71     Left Ventrical Ejection Fraction 70               Imaging: neuroimaging reviewed and directly visualized by me  DX-CHEST-PORTABLE (1 VIEW)   Final Result         1.  Pulmonary edema and/or infiltrates are identified, which appear somewhat increased since the prior exam.               DX-CHEST-PORTABLE (1 VIEW)   Final Result         1.  Pulmonary edema and/or infiltrates are identified, which are stable since the prior exam.            DX-CHEST-PORTABLE (1 VIEW)   Final Result         1.  Pulmonary edema and/or infiltrates are identified, which are stable since the prior exam.         DX-CHEST-PORTABLE (1 VIEW)   Final Result         1.  Pulmonary edema and/or infiltrates are identified, which are stable since the prior exam.      MR-MRA NECK-W/O   Final Result      1. MRA OF THE CERVICAL VERTEBRAL AND CAROTID ARTERIES WITHIN NORMAL LIMITS WITH NO EVIDENCE OF FLOW LIMITING LESION.      2. TORTUOUS LOOPS IN THE UPPER CERVICAL RIGHT ICA AND LEFT ICA BELOW THE SKULL BASE.      MR-MRA HEAD-W/O   Final Result      1. MRA OF THE [place] WITHIN NORMAL LIMITS WITH NO EVIDENCE OF ANEURYSM OR CEREBROVASCULAR OCCLUSIVE DISEASE.      MR-BRAIN-W/O   Final Result      1.  Interval resolution of punctate focus of restricted diffusion at the left high posterior frontal lobe seen on 6/12/2018.   2.  Remainder the study is within normal limits. No new abnormalities are evident.      DX-CHEST-PORTABLE (1 VIEW)   Final Result         1.  Pulmonary edema and/or infiltrates are identified, which are stable since the prior exam.      DX-CHEST-PORTABLE (1 VIEW)   Final Result         1. No significant interval change.            DX-CHEST-PORTABLE (1 VIEW)   Final Result         1. No significant interval change.         DX-CHEST-PORTABLE (1 VIEW)    Final Result         1. No significant interval change.      DX-CHEST-PORTABLE (1 VIEW)   Final Result         1. No significant interval change.      DX-CHEST-LIMITED (1 VIEW)   Final Result      Dialysis catheter projects over the proximal SVC.      Perihilar and bibasilar airspace opacities, right greater than left are again noted.      Pulmonary edema is likely present.      There may be a small right pleural effusion.      Multiple right-sided rib fractures and right clavicle fracture.      Cardiomegaly.            DX-ABDOMEN FOR TUBE PLACEMENT   Final Result      Enteric tube projects over the distal stomach.      DX-CHEST-PORTABLE (1 VIEW)   Final Result      Stable chest with right greater than left basilar atelectasis, probable right pleural fluid and hazy opacity compatible with contusion and/or edema      DX-CHEST-PORTABLE (1 VIEW)   Final Result      No significant change from prior exam.      DX-CHEST-PORTABLE (1 VIEW)   Final Result      1.  Worsening bilateral pulmonary infiltrate.   2.  No other significant change from prior exam.         DX-CHEST-PORTABLE (1 VIEW)   Final Result      Right internal jugular line tip overlies the SVC. No pneumothorax.   Bilateral perihilar/lung base atelectasis and edema and small right pleural effusion similar to recent findings.      DX-CHEST-PORTABLE (1 VIEW)   Final Result      No significant change from prior exam.      DX-CHEST-PORTABLE (1 VIEW)   Final Result         1.  Pulmonary edema and/or infiltrates are identified, which appear somewhat increased since the prior exam.   2.  Trace layering right pleural effusion   3.  Cardiomegaly   4.  Comminuted right clavicular fracture                     DX-CHEST-PORTABLE (1 VIEW)   Final Result         1.  Pulmonary edema and/or infiltrates are identified, which are stable since the prior exam.   2.  Cardiomegaly   3.  Comminuted right clavicular fracture                  DX-CHEST-PORTABLE (1 VIEW)   Final Result          1.  Pulmonary edema and/or infiltrates are identified, which are stable since the prior exam.   2.  Cardiomegaly   3.  Comminuted right clavicular fracture               ECHOCARDIOGRAM COMP W/O CONT   Final Result      MR-THORACIC SPINE-W/O   Final Result      1.  Multilevel degenerative change of thoracic spine.   2.  No gross abnormal signal within the thoracic cord to indicate edema or infarct.   3.  No epidural hematoma demonstrated.      MR-CERVICAL SPINE-W/O   Final Result      1.  Limited exam showing no focal abnormal signal within the cervical cord to indicate acute infarct.   2.  Multilevel degenerative disc disease with mild disc bulging at C3-4, C4-5, C5-6 and C6-7 as seen previously.      DX-CHEST-PORTABLE (1 VIEW)   Final Result         1.  Pulmonary edema and/or infiltrates are identified, which are stable since the prior exam.   2.  Cardiomegaly            DX-CHEST-PORTABLE (1 VIEW)   Final Result         1.  Pulmonary edema and/or infiltrates are identified, which are stable since the prior exam.   2.  Cardiomegaly   3.  Right rib fractures         MR-BRAIN-W/O   Final Result   Addendum 1 of 1   These findings were discussed with YEFRI FRAZIER on 6/12/2018 2:08 PM.      Final      1.  Punctate acute subcortical infarcts in the posterior frontal regions bilaterally, potentially embolic.   2.  No evidence for intracranial hemorrhage.   3.  Mild diffuse atrophy.   4.  Bilateral mastoid fluid, likely inflammatory.   5.  Acute and chronic paranasal sinus inflammatory changes.      MR-CERVICAL SPINE-W/O   Final Result      1.  Multilevel degenerative changes cervical spine with minimal reversal of curvature.   2.  Multilevel posterior disc bulging without evidence for cervical cord edema or myelopathy.   3.  Subtle findings raising concern for injury to the posterior longitudinal ligament at the C6-7 level with possible disruption of the disc as well.   4.  No epidural hematoma demonstrated.       DX-CHEST-PORTABLE (1 VIEW)   Final Result         1.  Pulmonary edema and/or infiltrates are identified, which are stable since the prior exam.   2.  Cardiomegaly   3.  Right rib fractures      NM-BILIARY (HIDA) SCAN WITH CCK   Final Result      Delayed activity within the gallbladder is nonspecific, possibly chronic cholecystitis in the appropriate clinical setting.      TX-[id number] VIEW   Final Result      1. Air-filled colon without significant distention.      2. Feeding tube tip projects over the duodenum.      TRAUMA-LE VENOUS SCREENING   Final Result      DX-CHEST-PORTABLE (1 VIEW)   Final Result         1.  Pulmonary edema and/or infiltrates are identified, which are stable since the prior exam.   2.  Cardiomegaly      US-GALLBLADDER   Final Result      Cholelithiasis with mild gallbladder wall thickening and trace pericholecystic fluid. Findings can be seen in acute cholecystitis in the correct clinical setting. Gallbladder wall thickening can also be seen in hepatitis, cirrhosis, hypoproteinemia.      Enlarged, heterogeneous and echogenic appearance of the liver can be seen in hepatic steatosis or hepatocellular disease.      Limited evaluation of the pancreas and aorta which are obscured.         CT-CHEST,ABDOMEN,PELVIS W/O   Final Result      Small bilateral pleural effusions with overlying atelectasis/consolidation, right greater than left. Foci of air are seen within the pleural fluid on the right which may be related to the presence of the chest tube but can be seen in the setting of an    empyema. Cavitary consolidation is not excluded.      Patchy and ground glass opacities bilaterally are likely infectious/inflammatory.      No pneumothorax.      Mildly prominent mediastinal lymph nodes likely reactive.      Small amount of free fluid in the abdomen and pelvis.      Density within the gallbladder may represent vicarious excretion of contrast versus sludge. There is pericholecystic fluid. Further  evaluation can be obtained with right upper quadrant ultrasound.      There appears to be mild duodenal wall thickening which may be in can be seen in duodenitis or peptic ulcer disease.      Possible punctate nonobstructing left renal calculus.      Colonic diverticulosis.      Bladder is decompressed by a Roberts catheter. Bladder wall thickening not excluded. Correlation with urinalysis recommended.      Third spacing.      Multisegmented right-sided rib fractures.      Comminuted right clavicle fracture.      Splenomegaly.         DX-CHEST-PORTABLE (1 VIEW)   Final Result      Improving bibasilar atelectasis.      DX-CHEST-PORTABLE (1 VIEW)   Final Result      Bilateral airspace opacities are not significantly changed compared to prior.      Small pleural effusions are not excluded.      No pneumothorax is identified.      Right clavicle fracture and right-sided rib fractures are again noted.         CT-HEAD W/O   Final Result         1. No evidence of acute intracranial hemorrhage or mass lesion.      2. New complete opacification of the bilateral mastoid air cells. New air-fluid level in the sphenoid sinuses. Correlate for infection.         DX-CHEST-PORTABLE (1 VIEW)   Final Result      Stable chest findings compared to 6/8.      DX-CHEST-PORTABLE (1 VIEW)   Final Result      Stable chest appearance compared with 6/7.      DX-CHEST-PORTABLE (1 VIEW)   Final Result      Worsening bilateral airspace opacities.      DX-ABDOMEN FOR TUBE PLACEMENT   Final Result      1.  NG tube and feeding tube as described above.      DX-CHEST-PORTABLE (1 VIEW)   Final Result         1. Worsening left lower lung opacities could relate to worsening atelectasis, edema or infection.      DX-CHEST-PORTABLE (1 VIEW)   Final Result         1. No significant interval change.      DX-SMALL BOWEL SERIES   Final Result      No radiographic evidence of bowel obstruction      Prolonged contrast transit time to the colon indicates ileus       Findings were discussed with CARMEN KING on 6/4/2018 6:10 PM.      US-ABDOMEN COMPLETE SURVEY   Final Result      1.  Cholelithiasis   2.  Splenomegaly   3.  Enlarged portal vein   4.  These findings suggest portal hypertension   5.  Subcentimeter LEFT hepatic cyst   6.  Echogenic liver, a nonspecific finding that often is found to represent steatosis.  Other infiltrative processes could have an identical appearance.         ECHOCARDIOGRAM-COMP W/ CONT   Final Result      DX-CHEST-PORTABLE (1 VIEW)   Final Result         1. No significant interval change.      DX-CHEST-PORTABLE (1 VIEW)   Final Result         1.  Pulmonary edema and/or infiltrates are identified, which are stable since the prior exam.   2.  Decreased soft tissue gas in the right chest wall and neck.   3.  Right rib fractures   4.  Cardiomegaly            DX-CHEST-PORTABLE (1 VIEW)   Final Result      Right subclavian catheter placement with the tip projecting over the superior vena cava. No pneumothorax is identified. Exam is otherwise unchanged.      DX-CHEST-PORTABLE (1 VIEW)   Final Result         1.  Pulmonary edema and/or infiltrates are identified, which are stable since the prior exam.   2.  Decreased soft tissue gas in the right chest wall and neck.   3.  Right rib fractures   4.  Cardiomegaly         DX-CHEST-PORTABLE (1 VIEW)   Final Result      1.  Interval placement of RIGHT chest tube.   2.  No other significant change from prior exam.         DX-CHEST-PORTABLE (1 VIEW)   Final Result      1.  Endotracheal tube and enteric catheter has been placed and appear appropriately located      2.  Bilateral atelectasis and/or pulmonary contusion      3.  Right chest wall air      4.  Right rib fracture      DX-CHEST-PORTABLE (1 VIEW)   Final Result         1.  Pulmonary edema and/or infiltrates are identified, which appear somewhat increased since the prior exam.   2.  Stable soft tissue gas in the right chest wall and neck.   3.  Right rib  fractures      DX-CHEST-PORTABLE (1 VIEW)   Final Result         1.  Pulmonary edema and/or infiltrates are identified, which appear somewhat increased since the prior exam.   2.  Increased soft tissue gas in the right chest wall and neck.   3.  Right rib fractures      CT-CHEST,ABDOMEN,PELVIS WITH   Final Result      1.  Multiple right rib fractures including multisegmented fractures and displaced fourth through sixth rib fractures.   2.  Small hemopneumothorax.   3.  Atelectasis and or contusions within the right lung and within the left lower lobe.   4.  Comminuted angulated fracture of the distal right clavicle incompletely imaged.   5.  Right scapula is incompletely imaged.   6.  Aorta appears intact. No mediastinal or retroperitoneal hematoma.   7.  No intra-abdominal solid organ injury identified.   8.  Diverticulosis of the colon.   9.  No free fluid or free air.   10.  Hepatic steatosis and mild splenomegaly.   Findings were discussed with YANY CISNEROS on 5/30/2018 4:22 PM.      CT-LSPINE W/O PLUS RECONS   Final Result      Degenerative changes as above described.      Hepatic steatosis.      Colonic diverticulosis.      Small right hemothorax with overlying atelectasis/contusion.      CT-TSPINE W/O PLUS RECONS   Final Result      Minute right pneumothorax.      Small right hemothorax overlying atelectasis/contusion. Ground glass opacities in the right upper lobe likely represent small pulmonary contusions.      Soft tissue air in the posterior right hemithorax and right supraclavicular region.      Multiple right-sided rib fractures.      Degenerative changes in the thoracic spine.         CT-CSPINE WITHOUT PLUS RECONS   Final Result      Multilevel degenerative changes in the cervical spine.      Comminuted fractures of the right first, second and third ribs.      Patchy groundglass opacity at the right lung apex may represent a contusion.      Soft tissue air in the right supraclavicular region and  posterior right hemithorax.      Air-fluid level in the left maxillary sinus can be seen in acute sinusitis.      CT-HEAD W/O   Final Result      No acute intracranial abnormality is identified.      Paranasal sinus disease.      Frontal soft tissue swelling.         DX-CHEST-LIMITED (1 VIEW)   Final Result      1.  Multiple right rib fractures and possible right clavicle and scapular fractures.   2.  Possible trace right pneumothorax.   3.  Left lung base atelectasis.   Findings were discussed with YANY CISNEROS on 5/30/2018 3:36 PM.      DX-SHOULDER 2+ RIGHT   Final Result         1.  Normal shoulder series.      2.  Fractures of the right fourth through sixth ribs laterally.          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  I do believe IVIG post PLEX will help confer best chance at full recovery.        ENCEPHALOPATHY, SUSPECT GBS/BICKERSTAFF ENCEPHALITIS, HEPATIC WITH CIRRHOSIS/HYPERAMMONEMIA CONCOMITANT  Ophthalmoplegia resolved  Hyperreflexia increasingly evident   Sensorium clearing  Punctate lesions on MRI brain could be due to same, or ischemic stroke in setting of trauma/acute hospitalization. MRA head wnl. MRA neck ordered today.  GQ1B ANTIBODY negative, this does not exclude diagnosis  On Keppra, EEG abnormal but no epileptiform- do not suspect sz but will keep prophylaxis until taper slow with epileptologist as otpt       No further neuro workup as inpatient if aforementioned studies WNL & continue maintain/regain neuro baseline.  Neuro sign off,  reconsult PRN.  F/u otpt Neuro ASAP.     I personally provided 35 minutes of total critical care time outside of time spent on separately billable/documented procedures. Time includes: review of laboratory data, review of radiology studies, discussion with consultants, discussion with family/patient, monitoring for potential decompensation.  Interventions were performed as documented above.         Kashmir Rush M.D.  , Neurohospitalist & Stroke  Clinical Professor, [employer]  Diplomate, Neurology & Neuroimaging

## 2018-06-29 NOTE — THERAPY
Speech Language Therapy Clinical Swallow Evaluation completed.  Functional Status: Patient seen for blue dye swallow evaluation this date.  Speaking valve already on and has been on for ~4 hours--patient sitting up in chair with wife present.  Saturations in the mid 90s and vocal quality clear but with decreased intensity.  Patient oriented to name and city with confusion in all other spheres.  He does have both expressive and receptive language deficits and would benefit from a comprehensive speech/language/cognitive evaluation with SLP. Patient able to follow single step oral motor commands about 50% of the time.  There is slight right side facial weakness noted.  Presentation of PO consisted of single blue dyed ice chips (10 total).  Patient with delayed onset of swallow and inconsistent audible upper airway congestion noted following swallow.  There was delayed coughing X3 which can be concerning for possible penetration/aspiration.  Wife reporting that patient always sounds congested, clears throat and coughs at baseline due to asthma and allergies, so it is difficult to determine if the coughing may be part of his baseline.  There was no blue suctioned from trach, but this does not r/o aspiration.  Extensive education provided to patient and wife regarding aspiration risk due to current medical status.  Discussed recommendations for continuation of NPO with cortrak--ok for swabs as long as they are squeezed out.  Would recommend FEES early next week to determine POC regarding swallow and G-tube. Patient with c/o fatigue at end of session, so speaking valve removed and cuff was reinflated.  OK for speaking valve being placed by trained staff and worn as tolerated by patient--please monitor closely for S/Sx of fatigue.  SLP will continue to follow.       Recommendations - Diet:  NPO with cortrak.  Swabs if squeezed out                          Medication Administration:  Via cortrak  Plan of Care: Will benefit  "from Speech Therapy 5 times per week  Post-Acute Therapy: Discharge to a transitional care facility for continued skilled therapy services.    See \"Rehab Therapy-Acute\" Patient Summary Report for complete documentation.     "

## 2018-06-29 NOTE — DISCHARGE PLANNING
Received request from Omayra at [facility] for updated; MAR, Labs, and therapy notes. TC to [phone] in bed day. She states she will fax over the requested information to, [phone].

## 2018-06-29 NOTE — DISCHARGE PLANNING
Spoke To: Jeff([facility])  Plan or Request: MAR, Labs and most recent therapy notes faxed to [facility] @ f:[phone] and Cheryl at [facility] @ f:[phone] per Jeff's([facility]) request.    Update:  Fax to [facility] failed. Information refaxed.

## 2018-06-29 NOTE — CARE PLAN
Problem: Ventilation Defect:  Goal: Ability to achieve and maintain unassisted ventilation or tolerate decreased levels of ventilator support    Intervention: Support and monitor invasive and noninvasive mechanical ventilation  Adult Ventilation Update    Total Vent Days: 8    Patient Lines/Drains/Airways Status    Active Airway     Name: Placement date: Placement time: Site: Days:    Airway Group Portex Trach Tracheostomy 8.0 06/07/18   1055   Tracheostomy   23              In the last 24 hours, the patient tolerated T-piece for 12.5 hrs.  (06/28/18 1929)    (ASV) % MIN VOL: 120 (06/29/18 0238)    Cough: Productive (06/29/18 0238)  Sputum Amount: Moderate (06/29/18 0238)  Sputum Color: Tan;Yellow (06/29/18 0238)  Sputum Consistency: Thick (06/29/18 0238)    Events/Summary/Plan: Pt placed back on vent to rest post 12.5 hours on T-piece (06/28/18 1929)
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
## 2018-06-29 NOTE — DISCHARGE PLANNING
Received TC message from Candace at [facility] regarding potential pt admission. Return message to Omayra [phone]. Left message requesting she call this SW back.

## 2018-06-29 NOTE — DISCHARGE PLANNING
Received TC from Omayra at [facility]. She had concerned that the pt may be receiving, dialysis. TC to bedside RN informed that the pt DOES NOT receive dialysis. Also informed that the pt will most likely not be ready to transfer until Monday 7/2/2018. Return TC to Omayra and updated her.

## 2018-06-29 NOTE — DISCHARGE PLANNING
Received TC message from [facility] employee, Hanna informing this Sw that Christophe at the pt's insurance wanted to speak with the pt's SW. TC to Christophe [phone]. Left message requesting she call this SW back.

## 2018-06-29 NOTE — CARE PLAN
Problem: Discharge Barriers/Planning  Goal: Patient's continuum of care needs will be met    Intervention: Involve patient and significant other/support system in setting and prioritizing goals for hospital stay and discharge  Spoke with wife regarding discharge plans and skilled nursing facility details.

## 2018-06-30 ENCOUNTER — APPOINTMENT (OUTPATIENT)
Dept: RADIOLOGY | Facility: MEDICAL CENTER | Age: 63
DRG: 003 | End: 2018-06-30
Attending: SURGERY
Payer: COMMERCIAL

## 2018-06-30 PROBLEM — R13.12 OROPHARYNGEAL DYSPHAGIA: Status: ACTIVE | Noted: 2018-06-30

## 2018-06-30 LAB
ANION GAP SERPL CALC-SCNC: 10 MMOL/L (ref 0–11.9)
BASOPHILS # BLD AUTO: 0.6 % (ref 0–1.8)
BASOPHILS # BLD: 0.09 K/UL (ref 0–0.12)
BUN SERPL-MCNC: 44 MG/DL (ref 8–22)
CALCIUM SERPL-MCNC: 9.2 MG/DL (ref 8.5–10.5)
CHLORIDE SERPL-SCNC: 101 MMOL/L (ref 96–112)
CO2 SERPL-SCNC: 27 MMOL/L (ref 20–33)
CREAT SERPL-MCNC: 0.55 MG/DL (ref 0.5–1.4)
EOSINOPHIL # BLD AUTO: 0.39 K/UL (ref 0–0.51)
EOSINOPHIL NFR BLD: 2.5 % (ref 0–6.9)
ERYTHROCYTE [DISTWIDTH] IN BLOOD BY AUTOMATED COUNT: 43 FL (ref 35.9–50)
GLUCOSE SERPL-MCNC: 106 MG/DL (ref 65–99)
HCT VFR BLD AUTO: 28.6 % (ref 42–52)
HGB BLD-MCNC: 9.4 G/DL (ref 14–18)
IMM GRANULOCYTES # BLD AUTO: 0.2 K/UL (ref 0–0.11)
IMM GRANULOCYTES NFR BLD AUTO: 1.3 % (ref 0–0.9)
LYMPHOCYTES # BLD AUTO: 3.74 K/UL (ref 1–4.8)
LYMPHOCYTES NFR BLD: 23.7 % (ref 22–41)
MCH RBC QN AUTO: 30 PG (ref 27–33)
MCHC RBC AUTO-ENTMCNC: 32.9 G/DL (ref 33.7–35.3)
MCV RBC AUTO: 91.4 FL (ref 81.4–97.8)
MONOCYTES # BLD AUTO: 1.26 K/UL (ref 0–0.85)
MONOCYTES NFR BLD AUTO: 8 % (ref 0–13.4)
NEUTROPHILS # BLD AUTO: 10.11 K/UL (ref 1.82–7.42)
NEUTROPHILS NFR BLD: 63.9 % (ref 44–72)
NRBC # BLD AUTO: 0 K/UL
NRBC BLD-RTO: 0 /100 WBC
PLATELET # BLD AUTO: 396 K/UL (ref 164–446)
PMV BLD AUTO: 11 FL (ref 9–12.9)
POTASSIUM SERPL-SCNC: 4 MMOL/L (ref 3.6–5.5)
RBC # BLD AUTO: 3.13 M/UL (ref 4.7–6.1)
SODIUM SERPL-SCNC: 138 MMOL/L (ref 135–145)
WBC # BLD AUTO: 15.8 K/UL (ref 4.8–10.8)

## 2018-06-30 PROCEDURE — 700111 HCHG RX REV CODE 636 W/ 250 OVERRIDE (IP): Performed by: SURGERY

## 2018-06-30 PROCEDURE — 71045 X-RAY EXAM CHEST 1 VIEW: CPT

## 2018-06-30 PROCEDURE — 99291 CRITICAL CARE FIRST HOUR: CPT | Performed by: SURGERY

## 2018-06-30 PROCEDURE — 700102 HCHG RX REV CODE 250 W/ 637 OVERRIDE(OP): Performed by: SURGERY

## 2018-06-30 PROCEDURE — 302101 FENESTRATED FOAM: Performed by: INTERNAL MEDICINE

## 2018-06-30 PROCEDURE — A9270 NON-COVERED ITEM OR SERVICE: HCPCS | Performed by: SURGERY

## 2018-06-30 PROCEDURE — 700105 HCHG RX REV CODE 258: Performed by: SURGERY

## 2018-06-30 PROCEDURE — 94640 AIRWAY INHALATION TREATMENT: CPT

## 2018-06-30 PROCEDURE — 700112 HCHG RX REV CODE 229: Performed by: SURGERY

## 2018-06-30 PROCEDURE — 94669 MECHANICAL CHEST WALL OSCILL: CPT

## 2018-06-30 PROCEDURE — 85025 COMPLETE CBC W/AUTO DIFF WBC: CPT

## 2018-06-30 PROCEDURE — 770022 HCHG ROOM/CARE - ICU (200)

## 2018-06-30 PROCEDURE — 700111 HCHG RX REV CODE 636 W/ 250 OVERRIDE (IP): Performed by: INTERNAL MEDICINE

## 2018-06-30 PROCEDURE — 700101 HCHG RX REV CODE 250: Performed by: SURGERY

## 2018-06-30 PROCEDURE — 80048 BASIC METABOLIC PNL TOTAL CA: CPT

## 2018-06-30 RX ORDER — FUROSEMIDE 10 MG/ML
20 INJECTION INTRAMUSCULAR; INTRAVENOUS
Status: DISCONTINUED | OUTPATIENT
Start: 2018-07-01 | End: 2018-07-02

## 2018-06-30 RX ORDER — SPIRONOLACTONE 25 MG/1
25 TABLET ORAL
Status: DISCONTINUED | OUTPATIENT
Start: 2018-07-01 | End: 2018-07-04 | Stop reason: HOSPADM

## 2018-06-30 RX ADMIN — ALBUTEROL SULFATE 2.5 MG: 2.5 SOLUTION RESPIRATORY (INHALATION) at 06:46

## 2018-06-30 RX ADMIN — LORAZEPAM 2 MG: 2 INJECTION INTRAMUSCULAR; INTRAVENOUS at 22:19

## 2018-06-30 RX ADMIN — FAMOTIDINE 20 MG: 20 TABLET ORAL at 20:08

## 2018-06-30 RX ADMIN — FUROSEMIDE 40 MG: 10 INJECTION, SOLUTION INTRAMUSCULAR; INTRAVENOUS at 09:27

## 2018-06-30 RX ADMIN — LEVETIRACETAM 1000 MG: 500 TABLET ORAL at 15:34

## 2018-06-30 RX ADMIN — FAMOTIDINE 20 MG: 20 TABLET ORAL at 09:27

## 2018-06-30 RX ADMIN — PROPRANOLOL HYDROCHLORIDE 10 MG: 10 TABLET ORAL at 20:08

## 2018-06-30 RX ADMIN — AMLODIPINE BESYLATE 5 MG: 10 TABLET ORAL at 09:26

## 2018-06-30 RX ADMIN — POLYETHYLENE GLYCOL 3350 1 PACKET: 17 POWDER, FOR SOLUTION ORAL at 09:28

## 2018-06-30 RX ADMIN — SODIUM BICARBONATE 3 ML: 84 INJECTION, SOLUTION INTRAVENOUS at 02:38

## 2018-06-30 RX ADMIN — SPIRONOLACTONE 50 MG: 50 TABLET ORAL at 15:35

## 2018-06-30 RX ADMIN — CEFEPIME 2 G: 2 INJECTION, POWDER, FOR SOLUTION INTRAVENOUS at 05:51

## 2018-06-30 RX ADMIN — SPIRONOLACTONE 50 MG: 50 TABLET ORAL at 06:00

## 2018-06-30 RX ADMIN — RIFAXIMIN 550 MG: 550 TABLET ORAL at 09:27

## 2018-06-30 RX ADMIN — LEVETIRACETAM 500 MG: 500 TABLET ORAL at 05:50

## 2018-06-30 RX ADMIN — PROPRANOLOL HYDROCHLORIDE 10 MG: 10 TABLET ORAL at 15:34

## 2018-06-30 RX ADMIN — ALBUTEROL SULFATE 2.5 MG: 2.5 SOLUTION RESPIRATORY (INHALATION) at 02:38

## 2018-06-30 RX ADMIN — SODIUM BICARBONATE 3 ML: 84 INJECTION, SOLUTION INTRAVENOUS at 06:46

## 2018-06-30 RX ADMIN — IRON SUCROSE 200 MG: 20 INJECTION, SOLUTION INTRAVENOUS at 15:34

## 2018-06-30 RX ADMIN — SENNOSIDES AND DOCUSATE SODIUM 1 TABLET: 8.6; 5 TABLET ORAL at 20:08

## 2018-06-30 RX ADMIN — RIFAXIMIN 550 MG: 550 TABLET ORAL at 20:08

## 2018-06-30 RX ADMIN — CEFEPIME 2 G: 2 INJECTION, POWDER, FOR SOLUTION INTRAVENOUS at 21:05

## 2018-06-30 RX ADMIN — CEFEPIME 2 G: 2 INJECTION, POWDER, FOR SOLUTION INTRAVENOUS at 15:34

## 2018-06-30 RX ADMIN — ENOXAPARIN SODIUM 30 MG: 100 INJECTION SUBCUTANEOUS at 09:27

## 2018-06-30 RX ADMIN — LEVETIRACETAM 1000 MG: 500 TABLET ORAL at 20:08

## 2018-06-30 RX ADMIN — DOCUSATE SODIUM 100 MG: 50 LIQUID ORAL at 09:27

## 2018-06-30 RX ADMIN — ENOXAPARIN SODIUM 30 MG: 100 INJECTION SUBCUTANEOUS at 20:13

## 2018-06-30 RX ADMIN — LORAZEPAM 2 MG: 2 INJECTION INTRAMUSCULAR; INTRAVENOUS at 18:28

## 2018-06-30 RX ADMIN — PROPRANOLOL HYDROCHLORIDE 10 MG: 10 TABLET ORAL at 05:52

## 2018-06-30 RX ADMIN — LORAZEPAM 2 MG: 2 INJECTION INTRAMUSCULAR; INTRAVENOUS at 05:52

## 2018-06-30 RX ADMIN — DOCUSATE SODIUM 100 MG: 50 LIQUID ORAL at 20:09

## 2018-06-30 ASSESSMENT — PAIN SCALES - GENERAL
PAINLEVEL_OUTOF10: ASSUMED PAIN PRESENT

## 2018-06-30 NOTE — CARE PLAN
Problem: Safety  Goal: Will remain free from injury  Bed in low locked position, room near nurses station, while pt in cardiac chair lap belt in use.     Problem: Skin Integrity  Goal: Risk for impaired skin integrity will decrease  Q2 hour turing, pillows for support and positioning, tube feeds at goal, mepilex in use

## 2018-06-30 NOTE — CARE PLAN
Problem: Safety  Goal: Will remain free from injury    Intervention: Provide assistance with mobility  PT MOBILIZED TO EDGE OF BED X 10 MIN WITH X 2 RN ASSIST ..TOLERATED WITHOUT COMPLICATIONS

## 2018-06-30 NOTE — PROGRESS NOTES
S- doing much better trying to climb out of bed at times, asking for a particular beer.  No other complaints.     O-   Allergies:   Allergies   Allergen Reactions   • Lyrica Unspecified     Chest pain         Current Facility-Administered Medications:   •  levETIRAcetam (KEPPRA) tablet 1,000 mg, 1,000 mg, Oral, Q8HRS, Vernon Quarles M.D., 1,000 mg at 06/30/18 1534  •  cefepime (MAXIPIME) 2 g in  mL IVPB, 2 g, Intravenous, Q8HRS, Vernon Quarles M.D., Last Rate: 200 mL/hr at 06/30/18 1534, 2 g at 06/30/18 1534  •  LORazepam (ATIVAN) injection 2 mg, 2 mg, Intravenous, Q4HRS PRN, Ambrocio Gregory M.D., 2 mg at 06/30/18 0552  •  furosemide (LASIX) injection 40 mg, 40 mg, Intravenous, Q DAY, Francisca Lafleur M.D., 40 mg at 06/30/18 0927  •  heparin injection 3,000 Units, 3,000 Units, Intravenous, PRN, Francisca Lafleur M.D., 3,000 Units at 06/18/18 1734  •  propranolol (INDERAL) tablet 10 mg, 10 mg, Per NG Tube, Q8HRS, Juan Hernandez M.D., 10 mg at 06/30/18 1534  •  spironolactone (ALDACTONE) tablet 50 mg, 50 mg, Per NG Tube, BID DIURETIC, Juan Hernandez M.D., 50 mg at 06/30/18 1535  •  amLODIPine (NORVASC) tablet 5 mg, 5 mg, Oral, Q DAY, Dmitry Covington M.D., 5 mg at 06/30/18 0926  •  labetalol (NORMODYNE,TRANDATE) injection 10 mg, 10 mg, Intravenous, Q4HRS PRN, Alexis Castillo D.O., 10 mg at 06/17/18 2014  •  riFAXIMin (XIFAXAN) tablet 550 mg, 550 mg, Oral, BID, Alexis Castillo D.O., 550 mg at 06/30/18 0927  •  docusate sodium 100mg/10mL (COLACE) solution 100 mg, 100 mg, Oral, BID, 100 mg at 06/30/18 0927 **OR** [DISCONTINUED] docusate sodium (COLACE) capsule 100 mg, 100 mg, Oral, BID, Stefany Terry M.D.  •  oxyCODONE immediate-release (ROXICODONE) tablet 5-15 mg, 5-15 mg, Oral, Q3HRS PRN, Chris Puente M.D., 15 mg at 06/29/18 0021  •  enoxaparin (LOVENOX) inj 30 mg, 30 mg, Subcutaneous, Q12HRS, Des Ramirez M.D., 30 mg at 06/30/18 0927  •  Respiratory Care per Protocol, , Nebulization,  Continuous RT, Des Ramirez M.D.  •  ipratropium-albuterol (DUONEB) nebulizer solution, 3 mL, Nebulization, Q4H PRN (RT), Stefany Terry M.D., Stopped at 06/29/18 0236  •  Pharmacy Consult Request ...Pain Management Review 1 Each, 1 Each, Other, PRN, Stefany Terry M.D.  •  senna-docusate (PERICOLACE or SENOKOT S) 8.6-50 MG per tablet 1 Tab, 1 Tab, Oral, Nightly, Stefany Terry M.D., 1 Tab at 06/29/18 2157  •  senna-docusate (PERICOLACE or SENOKOT S) 8.6-50 MG per tablet 1 Tab, 1 Tab, Oral, Q24HRS PRN, Stefany Terry M.D.  •  polyethylene glycol/lytes (MIRALAX) PACKET 1 Packet, 1 Packet, Oral, BID, Stefany Terry M.D., 1 Packet at 06/30/18 0928  •  magnesium hydroxide (MILK OF MAGNESIA) suspension 30 mL, 30 mL, Oral, DAILY, Stefany Terry M.D., Stopped at 06/28/18 0900  •  bisacodyl (DULCOLAX) suppository 10 mg, 10 mg, Rectal, Q24HRS PRN, Stefany Terry M.D., 10 mg at 06/02/18 0957  •  fleet enema 133 mL, 1 Each, Rectal, Once PRN, Stefany Terry M.D.  •  famotidine (PEPCID) tablet 20 mg, 20 mg, Oral, BID, 20 mg at 06/30/18 0927 **OR** [DISCONTINUED] famotidine (PEPCID) injection 20 mg, 20 mg, Intravenous, BID, Stefany Terry M.D., 20 mg at 06/08/18 2120  •  ondansetron (ZOFRAN) syringe/vial injection 4 mg, 4 mg, Intravenous, Q4HRS PRN, Stefany Terry M.D., 4 mg at 06/26/18 2136  •  albuterol inhaler 2 Puff, 2 Puff, Inhalation, Q4HRS Stefany CARL M.D.      PHYSICAL EXAM    Vitals:    06/30/18 1400 06/30/18 1412 06/30/18 1500 06/30/18 1600   BP:       Pulse: 87 87 84    Resp: (!) 22 (!) 31 (!) 41    Temp: 36.9 °C (98.4 °F)   36.9 °C (98.4 °F)   SpO2: 96% 92% 98%    Weight:       Height:           Head/Neck: NCAT. no meningismus neg kernig neg brudzinski. No obvious mass or heard bruit. No tender arteries or lost pulses. No rash of head or neck.    Skin: Warm, dry, intact. No rashes observed head/neck or body    Eyes/Funduscopic: unable    Mental Status: Awake, alerts to voice, lethargic post  ativan    Cranial Nerves:  CN II-XII intact. [id number].   No afferent pupillary defect. No oc dev. VF full threat. No nystagmus.       Motor: bulk reduced & tone up.  intact and sym, no abn mvmts     Sensory: symmetric to sharp    Coordination: n/a     DTR's: up, spreads in LE, toes up, cross add, BR to FF bilat    Gait/Station: n/a fall concern      Labs:  Recent Labs      06/28/18 0400  06/29/18   0450  06/30/18   0430   WBC  13.8*  13.7*  15.8*   RBC  2.88*  3.01*  3.13*   HEMOGLOBIN  8.7*  9.0*  9.4*   HEMATOCRIT  26.9*  28.0*  28.6*   MCV  93.4  93.0  91.4   MCH  30.2  29.9  30.0   MCHC  32.3*  32.1*  32.9*   RDW  44.6  44.6  43.0   PLATELETCT  365  388  396   MPV  11.2  11.5  11.0     Recent Labs      06/28/18 0400 06/29/18   0450  06/30/18   0430   SODIUM  137  137  138   POTASSIUM  3.8  3.7  4.0   CHLORIDE  102  100  101   CO2  28  30  27   GLUCOSE  116*  114*  106*   BUN  38*  41*  44*   CREATININE  0.61  0.69  0.55   CALCIUM  9.1  9.0  9.2                     Recent Labs      06/28/18 0400 06/29/18   0450  06/30/18   0430   SODIUM  137  137  138   POTASSIUM  3.8  3.7  4.0   CHLORIDE  102  100  101   CO2  28  30  27   GLUCOSE  116*  114*  106*   BUN  38*  41*  44*     Recent Labs      06/28/18   0400  06/29/18   0450  06/30/18   0430   SODIUM  137  137  138   POTASSIUM  3.8  3.7  4.0   CHLORIDE  102  100  101   CO2  28  30  27   BUN  38*  41*  44*   CREATININE  0.61  0.69  0.55   MAGNESIUM  2.4   --    --    PHOSPHORUS  2.8   --    --    CALCIUM  9.1  9.0  9.2         Results for orders placed or performed during the hospital encounter of 05/30/18   ECHOCARDIOGRAM COMP W/O CONT   Result Value Ref Range    Eject.Frac. MOD BP 69.48     Eject.Frac. MOD 4C 71     Eject.Frac. MOD 2C 59.71     Left Ventrical Ejection Fraction 70               Imaging: neuroimaging reviewed and directly visualized by me  DX-CHEST-PORTABLE (1 VIEW)   Final Result         1.  Pulmonary edema and/or infiltrates are identified,  which appear somewhat increased since the prior exam.               DX-CHEST-PORTABLE (1 VIEW)   Final Result         1.  Pulmonary edema and/or infiltrates are identified, which are stable since the prior exam.            DX-CHEST-PORTABLE (1 VIEW)   Final Result         1.  Pulmonary edema and/or infiltrates are identified, which are stable since the prior exam.         DX-CHEST-PORTABLE (1 VIEW)   Final Result         1.  Pulmonary edema and/or infiltrates are identified, which are stable since the prior exam.      MR-MRA NECK-W/O   Final Result      1. MRA OF THE CERVICAL VERTEBRAL AND CAROTID ARTERIES WITHIN NORMAL LIMITS WITH NO EVIDENCE OF FLOW LIMITING LESION.      2. TORTUOUS LOOPS IN THE UPPER CERVICAL RIGHT ICA AND LEFT ICA BELOW THE SKULL BASE.      MR-MRA HEAD-W/O   Final Result      1. MRA OF THE [place] WITHIN NORMAL LIMITS WITH NO EVIDENCE OF ANEURYSM OR CEREBROVASCULAR OCCLUSIVE DISEASE.      MR-BRAIN-W/O   Final Result      1.  Interval resolution of punctate focus of restricted diffusion at the left high posterior frontal lobe seen on 6/12/2018.   2.  Remainder the study is within normal limits. No new abnormalities are evident.      DX-CHEST-PORTABLE (1 VIEW)   Final Result         1.  Pulmonary edema and/or infiltrates are identified, which are stable since the prior exam.      DX-CHEST-PORTABLE (1 VIEW)   Final Result         1. No significant interval change.            DX-CHEST-PORTABLE (1 VIEW)   Final Result         1. No significant interval change.         DX-CHEST-PORTABLE (1 VIEW)   Final Result         1. No significant interval change.      DX-CHEST-PORTABLE (1 VIEW)   Final Result         1. No significant interval change.      DX-CHEST-LIMITED (1 VIEW)   Final Result      Dialysis catheter projects over the proximal SVC.      Perihilar and bibasilar airspace opacities, right greater than left are again noted.      Pulmonary edema is likely present.      There may be a small  right pleural effusion.      Multiple right-sided rib fractures and right clavicle fracture.      Cardiomegaly.            DX-ABDOMEN FOR TUBE PLACEMENT   Final Result      Enteric tube projects over the distal stomach.      DX-CHEST-PORTABLE (1 VIEW)   Final Result      Stable chest with right greater than left basilar atelectasis, probable right pleural fluid and hazy opacity compatible with contusion and/or edema      DX-CHEST-PORTABLE (1 VIEW)   Final Result      No significant change from prior exam.      DX-CHEST-PORTABLE (1 VIEW)   Final Result      1.  Worsening bilateral pulmonary infiltrate.   2.  No other significant change from prior exam.         DX-CHEST-PORTABLE (1 VIEW)   Final Result      Right internal jugular line tip overlies the SVC. No pneumothorax.   Bilateral perihilar/lung base atelectasis and edema and small right pleural effusion similar to recent findings.      DX-CHEST-PORTABLE (1 VIEW)   Final Result      No significant change from prior exam.      DX-CHEST-PORTABLE (1 VIEW)   Final Result         1.  Pulmonary edema and/or infiltrates are identified, which appear somewhat increased since the prior exam.   2.  Trace layering right pleural effusion   3.  Cardiomegaly   4.  Comminuted right clavicular fracture                     DX-CHEST-PORTABLE (1 VIEW)   Final Result         1.  Pulmonary edema and/or infiltrates are identified, which are stable since the prior exam.   2.  Cardiomegaly   3.  Comminuted right clavicular fracture                  DX-CHEST-PORTABLE (1 VIEW)   Final Result         1.  Pulmonary edema and/or infiltrates are identified, which are stable since the prior exam.   2.  Cardiomegaly   3.  Comminuted right clavicular fracture               ECHOCARDIOGRAM COMP W/O CONT   Final Result      MR-THORACIC SPINE-W/O   Final Result      1.  Multilevel degenerative change of thoracic spine.   2.  No gross abnormal signal within the thoracic cord to indicate edema or  infarct.   3.  No epidural hematoma demonstrated.      MR-CERVICAL SPINE-W/O   Final Result      1.  Limited exam showing no focal abnormal signal within the cervical cord to indicate acute infarct.   2.  Multilevel degenerative disc disease with mild disc bulging at C3-4, C4-5, C5-6 and C6-7 as seen previously.      DX-CHEST-PORTABLE (1 VIEW)   Final Result         1.  Pulmonary edema and/or infiltrates are identified, which are stable since the prior exam.   2.  Cardiomegaly            DX-CHEST-PORTABLE (1 VIEW)   Final Result         1.  Pulmonary edema and/or infiltrates are identified, which are stable since the prior exam.   2.  Cardiomegaly   3.  Right rib fractures         MR-BRAIN-W/O   Final Result   Addendum 1 of 1   These findings were discussed with YEFRI FRAZIER on 6/12/2018 2:08 PM.      Final      1.  Punctate acute subcortical infarcts in the posterior frontal regions bilaterally, potentially embolic.   2.  No evidence for intracranial hemorrhage.   3.  Mild diffuse atrophy.   4.  Bilateral mastoid fluid, likely inflammatory.   5.  Acute and chronic paranasal sinus inflammatory changes.      MR-CERVICAL SPINE-W/O   Final Result      1.  Multilevel degenerative changes cervical spine with minimal reversal of curvature.   2.  Multilevel posterior disc bulging without evidence for cervical cord edema or myelopathy.   3.  Subtle findings raising concern for injury to the posterior longitudinal ligament at the C6-7 level with possible disruption of the disc as well.   4.  No epidural hematoma demonstrated.      DX-CHEST-PORTABLE (1 VIEW)   Final Result         1.  Pulmonary edema and/or infiltrates are identified, which are stable since the prior exam.   2.  Cardiomegaly   3.  Right rib fractures      NM-BILIARY (HIDA) SCAN WITH CCK   Final Result      Delayed activity within the gallbladder is nonspecific, possibly chronic cholecystitis in the appropriate clinical setting.      [id number] VIEW    Final Result      1. Air-filled colon without significant distention.      2. Feeding tube tip projects over the duodenum.      TRAUMA-LE VENOUS SCREENING   Final Result      DX-CHEST-PORTABLE (1 VIEW)   Final Result         1.  Pulmonary edema and/or infiltrates are identified, which are stable since the prior exam.   2.  Cardiomegaly      US-GALLBLADDER   Final Result      Cholelithiasis with mild gallbladder wall thickening and trace pericholecystic fluid. Findings can be seen in acute cholecystitis in the correct clinical setting. Gallbladder wall thickening can also be seen in hepatitis, cirrhosis, hypoproteinemia.      Enlarged, heterogeneous and echogenic appearance of the liver can be seen in hepatic steatosis or hepatocellular disease.      Limited evaluation of the pancreas and aorta which are obscured.         CT-CHEST,ABDOMEN,PELVIS W/O   Final Result      Small bilateral pleural effusions with overlying atelectasis/consolidation, right greater than left. Foci of air are seen within the pleural fluid on the right which may be related to the presence of the chest tube but can be seen in the setting of an    empyema. Cavitary consolidation is not excluded.      Patchy and ground glass opacities bilaterally are likely infectious/inflammatory.      No pneumothorax.      Mildly prominent mediastinal lymph nodes likely reactive.      Small amount of free fluid in the abdomen and pelvis.      Density within the gallbladder may represent vicarious excretion of contrast versus sludge. There is pericholecystic fluid. Further evaluation can be obtained with right upper quadrant ultrasound.      There appears to be mild duodenal wall thickening which may be in can be seen in duodenitis or peptic ulcer disease.      Possible punctate nonobstructing left renal calculus.      Colonic diverticulosis.      Bladder is decompressed by a Roberts catheter. Bladder wall thickening not excluded. Correlation with urinalysis  recommended.      Third spacing.      Multisegmented right-sided rib fractures.      Comminuted right clavicle fracture.      Splenomegaly.         DX-CHEST-PORTABLE (1 VIEW)   Final Result      Improving bibasilar atelectasis.      DX-CHEST-PORTABLE (1 VIEW)   Final Result      Bilateral airspace opacities are not significantly changed compared to prior.      Small pleural effusions are not excluded.      No pneumothorax is identified.      Right clavicle fracture and right-sided rib fractures are again noted.         CT-HEAD W/O   Final Result         1. No evidence of acute intracranial hemorrhage or mass lesion.      2. New complete opacification of the bilateral mastoid air cells. New air-fluid level in the sphenoid sinuses. Correlate for infection.         DX-CHEST-PORTABLE (1 VIEW)   Final Result      Stable chest findings compared to 6/8.      DX-CHEST-PORTABLE (1 VIEW)   Final Result      Stable chest appearance compared with 6/7.      DX-CHEST-PORTABLE (1 VIEW)   Final Result      Worsening bilateral airspace opacities.      DX-ABDOMEN FOR TUBE PLACEMENT   Final Result      1.  NG tube and feeding tube as described above.      DX-CHEST-PORTABLE (1 VIEW)   Final Result         1. Worsening left lower lung opacities could relate to worsening atelectasis, edema or infection.      DX-CHEST-PORTABLE (1 VIEW)   Final Result         1. No significant interval change.      DX-SMALL BOWEL SERIES   Final Result      No radiographic evidence of bowel obstruction      Prolonged contrast transit time to the colon indicates ileus      Findings were discussed with CARMEN KING on 6/4/2018 6:10 PM.      US-ABDOMEN COMPLETE SURVEY   Final Result      1.  Cholelithiasis   2.  Splenomegaly   3.  Enlarged portal vein   4.  These findings suggest portal hypertension   5.  Subcentimeter LEFT hepatic cyst   6.  Echogenic liver, a nonspecific finding that often is found to represent steatosis.  Other infiltrative processes could  have an identical appearance.         ECHOCARDIOGRAM-COMP W/ CONT   Final Result      DX-CHEST-PORTABLE (1 VIEW)   Final Result         1. No significant interval change.      DX-CHEST-PORTABLE (1 VIEW)   Final Result         1.  Pulmonary edema and/or infiltrates are identified, which are stable since the prior exam.   2.  Decreased soft tissue gas in the right chest wall and neck.   3.  Right rib fractures   4.  Cardiomegaly            DX-CHEST-PORTABLE (1 VIEW)   Final Result      Right subclavian catheter placement with the tip projecting over the superior vena cava. No pneumothorax is identified. Exam is otherwise unchanged.      DX-CHEST-PORTABLE (1 VIEW)   Final Result         1.  Pulmonary edema and/or infiltrates are identified, which are stable since the prior exam.   2.  Decreased soft tissue gas in the right chest wall and neck.   3.  Right rib fractures   4.  Cardiomegaly         DX-CHEST-PORTABLE (1 VIEW)   Final Result      1.  Interval placement of RIGHT chest tube.   2.  No other significant change from prior exam.         DX-CHEST-PORTABLE (1 VIEW)   Final Result      1.  Endotracheal tube and enteric catheter has been placed and appear appropriately located      2.  Bilateral atelectasis and/or pulmonary contusion      3.  Right chest wall air      4.  Right rib fracture      DX-CHEST-PORTABLE (1 VIEW)   Final Result         1.  Pulmonary edema and/or infiltrates are identified, which appear somewhat increased since the prior exam.   2.  Stable soft tissue gas in the right chest wall and neck.   3.  Right rib fractures      DX-CHEST-PORTABLE (1 VIEW)   Final Result         1.  Pulmonary edema and/or infiltrates are identified, which appear somewhat increased since the prior exam.   2.  Increased soft tissue gas in the right chest wall and neck.   3.  Right rib fractures      CT-CHEST,ABDOMEN,PELVIS WITH   Final Result      1.  Multiple right rib fractures including multisegmented fractures and  displaced fourth through sixth rib fractures.   2.  Small hemopneumothorax.   3.  Atelectasis and or contusions within the right lung and within the left lower lobe.   4.  Comminuted angulated fracture of the distal right clavicle incompletely imaged.   5.  Right scapula is incompletely imaged.   6.  Aorta appears intact. No mediastinal or retroperitoneal hematoma.   7.  No intra-abdominal solid organ injury identified.   8.  Diverticulosis of the colon.   9.  No free fluid or free air.   10.  Hepatic steatosis and mild splenomegaly.   Findings were discussed with YANY CISNEROS on 5/30/2018 4:22 PM.      CT-LSPINE W/O PLUS RECONS   Final Result      Degenerative changes as above described.      Hepatic steatosis.      Colonic diverticulosis.      Small right hemothorax with overlying atelectasis/contusion.      CT-TSPINE W/O PLUS RECONS   Final Result      Minute right pneumothorax.      Small right hemothorax overlying atelectasis/contusion. Ground glass opacities in the right upper lobe likely represent small pulmonary contusions.      Soft tissue air in the posterior right hemithorax and right supraclavicular region.      Multiple right-sided rib fractures.      Degenerative changes in the thoracic spine.         CT-CSPINE WITHOUT PLUS RECONS   Final Result      Multilevel degenerative changes in the cervical spine.      Comminuted fractures of the right first, second and third ribs.      Patchy groundglass opacity at the right lung apex may represent a contusion.      Soft tissue air in the right supraclavicular region and posterior right hemithorax.      Air-fluid level in the left maxillary sinus can be seen in acute sinusitis.      CT-HEAD W/O   Final Result      No acute intracranial abnormality is identified.      Paranasal sinus disease.      Frontal soft tissue swelling.         DX-CHEST-LIMITED (1 VIEW)   Final Result      1.  Multiple right rib fractures and possible right clavicle and scapular  fractures.   2.  Possible trace right pneumothorax.   3.  Left lung base atelectasis.   Findings were discussed with YANY CISNEROS on 5/30/2018 3:36 PM.      DX-SHOULDER 2+ RIGHT   Final Result         1.  Normal shoulder series.      2.  Fractures of the right fourth through sixth ribs laterally.      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  I do believe IVIG post PLEX will help confer best chance at full recovery. IgA eval pend to start IVIG safely.      ENCEPHALOPATHY, SUSPECT GBS/BICKERSTAFF ENCEPHALITIS, HEPATIC WITH CIRRHOSIS/HYPERAMMONEMIA CONCOMITANT  Ophthalmoplegia resolved  Hyperreflexia increasingly evident   Sensorium clearing  Punctate lesions on MRI brain could be due to same, or ischemic stroke in setting of trauma/acute hospitalization. MRA head wnl. MRA neck ordered today.  GQ1B ANTIBODY negative, this does not exclude diagnosis  On Keppra, EEG abnormal but no epileptiform- do not suspect sz but will keep prophylaxis until taper slow with epileptologist as otpt        No further neuro workup as inpatient if aforementioned studies WNL & continue maintain/regain neuro baseline.  Neuro sign off, reconsult PRN.  F/u otpt Neuro ASAP.      I personally provided 35 minutes of total critical care time outside of time spent on separately billable/documented procedures. Time includes: review of laboratory data, review of radiology studies, discussion with consultants, discussion with family/patient,  monitoring for potential decompensation.  Interventions were performed as documented above.         Kashmir Rush M.D.  , Neurohospitalist & Stroke  Clinical Professor, [employer]  Diplomate, Neurology & Neuroimaging

## 2018-06-30 NOTE — CARE PLAN
Problem: Ventilation Defect:  Goal: Ability to achieve and maintain unassisted ventilation or tolerate decreased levels of ventilator support    Intervention: Support and monitor invasive and noninvasive mechanical ventilation  Adult Ventilation Update    Total Vent Days: 23      Patient Lines/Drains/Airways Status    Active Airway     Name: Placement date: Placement time: Site: Days:    Airway Group Portex Trach Tracheostomy 8.0 06/07/18   1055   Tracheostomy   23              In the last 24 hours, the patient tolerated T-piece for just over 22 hours.    Cough: Productive (06/30/18 0240)  Sputum Amount: Moderate (06/30/18 0400)  Sputum Color: Tan;Yellow (06/30/18 0400)  Sputum Consistency: Thick (06/30/18 0400)    Mobility  Level of Mobility: Level II (06/29/18 2300)  Activity Performed: Edge of bed (06/29/18 2300)  Time Activity Tolerated: 10 min (06/29/18 2300)  Assistance / Tolerance: Assistance of Two or More (06/29/18 2300)  Pt Calls for Assistance: No (06/30/18 0400)  Staff Present for Mobilization: RN (x 2 rn) (06/29/18 2300)

## 2018-07-01 ENCOUNTER — APPOINTMENT (OUTPATIENT)
Dept: RADIOLOGY | Facility: MEDICAL CENTER | Age: 63
DRG: 003 | End: 2018-07-01
Attending: SURGERY
Payer: COMMERCIAL

## 2018-07-01 LAB
AMMONIA PLAS-SCNC: 46 UMOL/L (ref 11–45)
ANION GAP SERPL CALC-SCNC: 11 MMOL/L (ref 0–11.9)
BASOPHILS # BLD AUTO: 0.4 % (ref 0–1.8)
BASOPHILS # BLD: 0.08 K/UL (ref 0–0.12)
BUN SERPL-MCNC: 44 MG/DL (ref 8–22)
CALCIUM SERPL-MCNC: 9.7 MG/DL (ref 8.5–10.5)
CHLORIDE SERPL-SCNC: 102 MMOL/L (ref 96–112)
CO2 SERPL-SCNC: 26 MMOL/L (ref 20–33)
CREAT SERPL-MCNC: 0.65 MG/DL (ref 0.5–1.4)
EOSINOPHIL # BLD AUTO: 0.49 K/UL (ref 0–0.51)
EOSINOPHIL NFR BLD: 2.7 % (ref 0–6.9)
ERYTHROCYTE [DISTWIDTH] IN BLOOD BY AUTOMATED COUNT: 43.9 FL (ref 35.9–50)
GLUCOSE SERPL-MCNC: 99 MG/DL (ref 65–99)
HCT VFR BLD AUTO: 30.6 % (ref 42–52)
HGB BLD-MCNC: 10.1 G/DL (ref 14–18)
IMM GRANULOCYTES # BLD AUTO: 0.28 K/UL (ref 0–0.11)
IMM GRANULOCYTES NFR BLD AUTO: 1.5 % (ref 0–0.9)
LYMPHOCYTES # BLD AUTO: 4.33 K/UL (ref 1–4.8)
LYMPHOCYTES NFR BLD: 24 % (ref 22–41)
MCH RBC QN AUTO: 30.3 PG (ref 27–33)
MCHC RBC AUTO-ENTMCNC: 33 G/DL (ref 33.7–35.3)
MCV RBC AUTO: 91.9 FL (ref 81.4–97.8)
MONOCYTES # BLD AUTO: 1.37 K/UL (ref 0–0.85)
MONOCYTES NFR BLD AUTO: 7.6 % (ref 0–13.4)
NEUTROPHILS # BLD AUTO: 11.52 K/UL (ref 1.82–7.42)
NEUTROPHILS NFR BLD: 63.8 % (ref 44–72)
NRBC # BLD AUTO: 0 K/UL
NRBC BLD-RTO: 0 /100 WBC
PLATELET # BLD AUTO: 420 K/UL (ref 164–446)
PMV BLD AUTO: 11 FL (ref 9–12.9)
POTASSIUM SERPL-SCNC: 4 MMOL/L (ref 3.6–5.5)
RBC # BLD AUTO: 3.33 M/UL (ref 4.7–6.1)
SODIUM SERPL-SCNC: 139 MMOL/L (ref 135–145)
WBC # BLD AUTO: 18.1 K/UL (ref 4.8–10.8)

## 2018-07-01 PROCEDURE — A9270 NON-COVERED ITEM OR SERVICE: HCPCS | Performed by: SURGERY

## 2018-07-01 PROCEDURE — 94640 AIRWAY INHALATION TREATMENT: CPT

## 2018-07-01 PROCEDURE — 700102 HCHG RX REV CODE 250 W/ 637 OVERRIDE(OP): Performed by: SURGERY

## 2018-07-01 PROCEDURE — 700112 HCHG RX REV CODE 229: Performed by: SURGERY

## 2018-07-01 PROCEDURE — 700111 HCHG RX REV CODE 636 W/ 250 OVERRIDE (IP): Performed by: SURGERY

## 2018-07-01 PROCEDURE — 71045 X-RAY EXAM CHEST 1 VIEW: CPT

## 2018-07-01 PROCEDURE — 99233 SBSQ HOSP IP/OBS HIGH 50: CPT | Performed by: SURGERY

## 2018-07-01 PROCEDURE — 85025 COMPLETE CBC W/AUTO DIFF WBC: CPT

## 2018-07-01 PROCEDURE — 80048 BASIC METABOLIC PNL TOTAL CA: CPT

## 2018-07-01 PROCEDURE — 770022 HCHG ROOM/CARE - ICU (200)

## 2018-07-01 PROCEDURE — 82140 ASSAY OF AMMONIA: CPT

## 2018-07-01 RX ORDER — TRAZODONE HYDROCHLORIDE 150 MG/1
300 TABLET ORAL
Status: DISCONTINUED | OUTPATIENT
Start: 2018-07-01 | End: 2018-07-04 | Stop reason: HOSPADM

## 2018-07-01 RX ADMIN — SPIRONOLACTONE 25 MG: 25 TABLET, FILM COATED ORAL at 05:09

## 2018-07-01 RX ADMIN — LORAZEPAM 2 MG: 2 INJECTION INTRAMUSCULAR; INTRAVENOUS at 04:23

## 2018-07-01 RX ADMIN — AMLODIPINE BESYLATE 5 MG: 10 TABLET ORAL at 08:39

## 2018-07-01 RX ADMIN — FAMOTIDINE 20 MG: 20 TABLET ORAL at 08:35

## 2018-07-01 RX ADMIN — LEVETIRACETAM 1000 MG: 500 TABLET ORAL at 21:05

## 2018-07-01 RX ADMIN — PROPRANOLOL HYDROCHLORIDE 10 MG: 10 TABLET ORAL at 05:09

## 2018-07-01 RX ADMIN — PROPRANOLOL HYDROCHLORIDE 10 MG: 10 TABLET ORAL at 15:45

## 2018-07-01 RX ADMIN — DOCUSATE SODIUM 100 MG: 50 LIQUID ORAL at 08:39

## 2018-07-01 RX ADMIN — SPIRONOLACTONE 25 MG: 25 TABLET, FILM COATED ORAL at 16:16

## 2018-07-01 RX ADMIN — FUROSEMIDE 20 MG: 10 INJECTION, SOLUTION INTRAMUSCULAR; INTRAVENOUS at 08:40

## 2018-07-01 RX ADMIN — MAGNESIUM HYDROXIDE 30 ML: 400 SUSPENSION ORAL at 08:39

## 2018-07-01 RX ADMIN — POLYETHYLENE GLYCOL 3350 1 PACKET: 17 POWDER, FOR SOLUTION ORAL at 08:40

## 2018-07-01 RX ADMIN — LEVETIRACETAM 1000 MG: 500 TABLET ORAL at 05:09

## 2018-07-01 RX ADMIN — RIFAXIMIN 550 MG: 550 TABLET ORAL at 19:58

## 2018-07-01 RX ADMIN — TRAZODONE HYDROCHLORIDE 300 MG: 150 TABLET ORAL at 19:59

## 2018-07-01 RX ADMIN — LEVETIRACETAM 1000 MG: 500 TABLET ORAL at 16:20

## 2018-07-01 RX ADMIN — RIFAXIMIN 550 MG: 550 TABLET ORAL at 08:40

## 2018-07-01 RX ADMIN — ENOXAPARIN SODIUM 30 MG: 100 INJECTION SUBCUTANEOUS at 09:30

## 2018-07-01 RX ADMIN — PROPRANOLOL HYDROCHLORIDE 10 MG: 10 TABLET ORAL at 21:05

## 2018-07-01 RX ADMIN — ENOXAPARIN SODIUM 30 MG: 100 INJECTION SUBCUTANEOUS at 20:07

## 2018-07-01 RX ADMIN — FAMOTIDINE 20 MG: 20 TABLET ORAL at 19:58

## 2018-07-01 NOTE — CARE PLAN
Problem: Infection  Goal: Will remain free from infection  Outcome: PROGRESSING SLOWER THAN EXPECTED  Oral care, delgado care, scrub the hub, promote hand hygiene    Problem: Skin Integrity  Goal: Risk for impaired skin integrity will decrease  Turn q2, low pressure mattress, barrier wipes, mepilex in place
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
## 2018-07-01 NOTE — PROGRESS NOTES
Cortrak Placement    Tube Team verified patient name and medical record number prior to tube placement.  Cortrak tube 43 inch, 12 [place] placed at 95cm in right nare.  Per Cortrak picture, tube appears to be in the small bowel  Nursing Instructions: Awaiting KUB to confirm placement before use for medications or feeding. Once placement confirmed, flush tube with 30 ml of water, and then remove and save stylet, in patient medication drawer.

## 2018-07-01 NOTE — PROGRESS NOTES
S- wife is happy about his recovery.  He nods he is well.  No other complaints.     O-   Allergies:   Allergies   Allergen Reactions   • Lyrica Unspecified     Chest pain         Current Facility-Administered Medications:   •  traZODone (DESYREL) tablet 300 mg, 300 mg, Oral, QHS, Alexis Castillo D.O.  •  spironolactone (ALDACTONE) tablet 25 mg, 25 mg, Per NG Tube, BID DIURETIC, Alexis Castillo D.O., 25 mg at 07/01/18 0509  •  furosemide (LASIX) injection 20 mg, 20 mg, Intravenous, Q DAY, Alexis Castillo D.O., 20 mg at 07/01/18 0840  •  levETIRAcetam (KEPPRA) tablet 1,000 mg, 1,000 mg, Oral, Q8HRS, Vernon Quarles M.D., 1,000 mg at 07/01/18 0509  •  LORazepam (ATIVAN) injection 2 mg, 2 mg, Intravenous, Q4HRS PRN, Ambrocio Gregory M.D., 2 mg at 07/01/18 0423  •  propranolol (INDERAL) tablet 10 mg, 10 mg, Per NG Tube, Q8HRS, Juan Hernandez M.D., 10 mg at 07/01/18 0509  •  amLODIPine (NORVASC) tablet 5 mg, 5 mg, Oral, Q DAY, Dmitry Covington M.D., 5 mg at 07/01/18 0839  •  labetalol (NORMODYNE,TRANDATE) injection 10 mg, 10 mg, Intravenous, Q4HRS PRN, Alexis Castillo D.O., 10 mg at 06/17/18 2014  •  riFAXIMin (XIFAXAN) tablet 550 mg, 550 mg, Oral, BID, Alexis Castillo D.O., 550 mg at 07/01/18 0840  •  docusate sodium 100mg/10mL (COLACE) solution 100 mg, 100 mg, Oral, BID, 100 mg at 07/01/18 0839 **OR** [DISCONTINUED] docusate sodium (COLACE) capsule 100 mg, 100 mg, Oral, BID, Stefany Terry M.D.  •  oxyCODONE immediate-release (ROXICODONE) tablet 5-15 mg, 5-15 mg, Oral, Q3HRS PRN, Chris Puente M.D., 15 mg at 06/29/18 0021  •  enoxaparin (LOVENOX) inj 30 mg, 30 mg, Subcutaneous, Q12HRS, Des Ramirez M.D., 30 mg at 06/30/18 2013  •  Respiratory Care per Protocol, , Nebulization, Continuous RT, Des Ramirez M.D.  •  ipratropium-albuterol (DUONEB) nebulizer solution, 3 mL, Nebulization, Q4H PRN (RT), Stefany Terry M.D., Stopped at 06/29/18 0236  •  Pharmacy Consult Request ...Pain Management Review 1 Each, 1  Each, Other, PRN, Stefany Terry M.D.  •  senna-docusate (PERICOLACE or SENOKOT S) 8.6-50 MG per tablet 1 Tab, 1 Tab, Oral, Nightly, Stefany Terry M.D., 1 Tab at 06/30/18 2008  •  senna-docusate (PERICOLACE or SENOKOT S) 8.6-50 MG per tablet 1 Tab, 1 Tab, Oral, Q24HRS PRN, Stefany Terry M.D.  •  polyethylene glycol/lytes (MIRALAX) PACKET 1 Packet, 1 Packet, Oral, BID, Stefany Terry M.D., 1 Packet at 07/01/18 0840  •  magnesium hydroxide (MILK OF MAGNESIA) suspension 30 mL, 30 mL, Oral, DAILY, Stefany Terry M.D., 30 mL at 07/01/18 0839  •  bisacodyl (DULCOLAX) suppository 10 mg, 10 mg, Rectal, Q24HRS PRN, Stefany Terry M.D., 10 mg at 06/02/18 0957  •  fleet enema 133 mL, 1 Each, Rectal, Once PRN, Stefany Terry M.D.  •  famotidine (PEPCID) tablet 20 mg, 20 mg, Oral, BID, 20 mg at 07/01/18 0835 **OR** [DISCONTINUED] famotidine (PEPCID) injection 20 mg, 20 mg, Intravenous, BID, Stefany Terry M.D., 20 mg at 06/08/18 2120  •  ondansetron (ZOFRAN) syringe/vial injection 4 mg, 4 mg, Intravenous, Q4HRS PRN, Stefany Terry M.D., 4 mg at 06/26/18 2136  •  albuterol inhaler 2 Puff, 2 Puff, Inhalation, Q4HRS PRN, Stefany Terry M.D.      PHYSICAL EXAM    Vitals:    07/01/18 0803 07/01/18 0900 07/01/18 0949 07/01/18 1000   BP:       Pulse: 85 79 84 86   Resp: (!) 32 (!) 27 18 (!) 27   Temp: 36.9 °C (98.4 °F)   37.1 °C (98.7 °F)   SpO2: 96%  97%    Weight:       Height:           Head/Neck: NCAT. no meningismus neg kernig neg brudzinski. No obvious mass or heard bruit. No tender arteries or lost pulses. No rash of head or neck.     Skin: Warm, dry, intact. No rashes observed head/neck or body     Eyes/Funduscopic: unable     Mental Status: Awake, alert, appropriate answers     Cranial Nerves:  CN II-XII intact. [id number].   No afferent pupillary defect. No oc dev. VF full threat. No nystagmus.                             Motor: bulk reduced & tone up.  intact and sym, no abn mvmts                Sensory: symmetric  to sharp     Coordination: n/a      DTR's: up, spreads in LE, toes up, cross add, BR to FF bilat     Gait/Station: n/a fall concern      Labs:  Recent Labs      06/29/18 0450 06/30/18 0430 07/01/18   0430   WBC  13.7*  15.8*  18.1*   RBC  3.01*  3.13*  3.33*   HEMOGLOBIN  9.0*  9.4*  10.1*   HEMATOCRIT  28.0*  28.6*  30.6*   MCV  93.0  91.4  91.9   MCH  29.9  30.0  30.3   MCHC  32.1*  32.9*  33.0*   RDW  44.6  43.0  43.9   PLATELETCT  388  396  420   MPV  11.5  11.0  11.0     Recent Labs      06/29/18 0450 06/30/18 0430 07/01/18   0430   SODIUM  137  138  139   POTASSIUM  3.7  4.0  4.0   CHLORIDE  100  101  102   CO2  30  27  26   GLUCOSE  114*  106*  99   BUN  41*  44*  44*   CREATININE  0.69  0.55  0.65   CALCIUM  9.0  9.2  9.7                     Recent Labs      06/29/18 0450 06/30/18 0430 07/01/18   0430   SODIUM  137  138  139   POTASSIUM  3.7  4.0  4.0   CHLORIDE  100  101  102   CO2  30  27  26   GLUCOSE  114*  106*  99   BUN  41*  44*  44*     Recent Labs      06/29/18 0450  06/30/18 0430  07/01/18   0430   SODIUM  137  138  139   POTASSIUM  3.7  4.0  4.0   CHLORIDE  100  101  102   CO2  30  27  26   BUN  41*  44*  44*   CREATININE  0.69  0.55  0.65   CALCIUM  9.0  9.2  9.7         Results for orders placed or performed during the hospital encounter of 05/30/18   ECHOCARDIOGRAM COMP W/O CONT   Result Value Ref Range    Eject.Frac. MOD BP 69.48     Eject.Frac. MOD 4C 71     Eject.Frac. MOD 2C 59.71     Left Ventrical Ejection Fraction 70               Imaging: neuroimaging reviewed and directly visualized by me  DX-CHEST-PORTABLE (1 VIEW)   Final Result         1.  Pulmonary edema and/or infiltrates are identified, which are somewhat decreased since the prior exam.   2.  Right rib fractures   3.  Comminuted right clavicular fracture                  DX-ABDOMEN FOR TUBE PLACEMENT   Final Result         1.  Air-filled distended loops of bowel are seen, appearance suggests ileus. Recommend  radiographic followup to resolution to exclude progression to obstruction.   2.  Dobbhoff tube tip overlying the expected location of the ligament of Treitz or proximal jejunum.      DX-CHEST-PORTABLE (1 VIEW)   Final Result      Mild improvement in edema favored over pneumonia      DX-CHEST-PORTABLE (1 VIEW)   Final Result         1.  Pulmonary edema and/or infiltrates are identified, which appear somewhat increased since the prior exam.               DX-CHEST-PORTABLE (1 VIEW)   Final Result         1.  Pulmonary edema and/or infiltrates are identified, which are stable since the prior exam.            DX-CHEST-PORTABLE (1 VIEW)   Final Result         1.  Pulmonary edema and/or infiltrates are identified, which are stable since the prior exam.         DX-CHEST-PORTABLE (1 VIEW)   Final Result         1.  Pulmonary edema and/or infiltrates are identified, which are stable since the prior exam.      MR-MRA NECK-W/O   Final Result      1. MRA OF THE CERVICAL VERTEBRAL AND CAROTID ARTERIES WITHIN NORMAL LIMITS WITH NO EVIDENCE OF FLOW LIMITING LESION.      2. TORTUOUS LOOPS IN THE UPPER CERVICAL RIGHT ICA AND LEFT ICA BELOW THE SKULL BASE.      MR-MRA HEAD-W/O   Final Result      1. MRA OF THE [place] OF [place] WITHIN NORMAL LIMITS WITH NO EVIDENCE OF ANEURYSM OR CEREBROVASCULAR OCCLUSIVE DISEASE.      MR-BRAIN-W/O   Final Result      1.  Interval resolution of punctate focus of restricted diffusion at the left high posterior frontal lobe seen on 6/12/2018.   2.  Remainder the study is within normal limits. No new abnormalities are evident.      DX-CHEST-PORTABLE (1 VIEW)   Final Result         1.  Pulmonary edema and/or infiltrates are identified, which are stable since the prior exam.      DX-CHEST-PORTABLE (1 VIEW)   Final Result         1. No significant interval change.            DX-CHEST-PORTABLE (1 VIEW)   Final Result         1. No significant interval change.         DX-CHEST-PORTABLE (1 VIEW)   Final Result          1. No significant interval change.      DX-CHEST-PORTABLE (1 VIEW)   Final Result         1. No significant interval change.      DX-CHEST-LIMITED (1 VIEW)   Final Result      Dialysis catheter projects over the proximal SVC.      Perihilar and bibasilar airspace opacities, right greater than left are again noted.      Pulmonary edema is likely present.      There may be a small right pleural effusion.      Multiple right-sided rib fractures and right clavicle fracture.      Cardiomegaly.            DX-ABDOMEN FOR TUBE PLACEMENT   Final Result      Enteric tube projects over the distal stomach.      DX-CHEST-PORTABLE (1 VIEW)   Final Result      Stable chest with right greater than left basilar atelectasis, probable right pleural fluid and hazy opacity compatible with contusion and/or edema      DX-CHEST-PORTABLE (1 VIEW)   Final Result      No significant change from prior exam.      DX-CHEST-PORTABLE (1 VIEW)   Final Result      1.  Worsening bilateral pulmonary infiltrate.   2.  No other significant change from prior exam.         DX-CHEST-PORTABLE (1 VIEW)   Final Result      Right internal jugular line tip overlies the SVC. No pneumothorax.   Bilateral perihilar/lung base atelectasis and edema and small right pleural effusion similar to recent findings.      DX-CHEST-PORTABLE (1 VIEW)   Final Result      No significant change from prior exam.      DX-CHEST-PORTABLE (1 VIEW)   Final Result         1.  Pulmonary edema and/or infiltrates are identified, which appear somewhat increased since the prior exam.   2.  Trace layering right pleural effusion   3.  Cardiomegaly   4.  Comminuted right clavicular fracture                     DX-CHEST-PORTABLE (1 VIEW)   Final Result         1.  Pulmonary edema and/or infiltrates are identified, which are stable since the prior exam.   2.  Cardiomegaly   3.  Comminuted right clavicular fracture                  DX-CHEST-PORTABLE (1 VIEW)   Final Result         1.   Pulmonary edema and/or infiltrates are identified, which are stable since the prior exam.   2.  Cardiomegaly   3.  Comminuted right clavicular fracture               ECHOCARDIOGRAM COMP W/O CONT   Final Result      MR-THORACIC SPINE-W/O   Final Result      1.  Multilevel degenerative change of thoracic spine.   2.  No gross abnormal signal within the thoracic cord to indicate edema or infarct.   3.  No epidural hematoma demonstrated.      MR-CERVICAL SPINE-W/O   Final Result      1.  Limited exam showing no focal abnormal signal within the cervical cord to indicate acute infarct.   2.  Multilevel degenerative disc disease with mild disc bulging at C3-4, C4-5, C5-6 and C6-7 as seen previously.      DX-CHEST-PORTABLE (1 VIEW)   Final Result         1.  Pulmonary edema and/or infiltrates are identified, which are stable since the prior exam.   2.  Cardiomegaly            DX-CHEST-PORTABLE (1 VIEW)   Final Result         1.  Pulmonary edema and/or infiltrates are identified, which are stable since the prior exam.   2.  Cardiomegaly   3.  Right rib fractures         MR-BRAIN-W/O   Final Result   Addendum 1 of 1   These findings were discussed with YEFRI FRAZIER on 6/12/2018 2:08 PM.      Final      1.  Punctate acute subcortical infarcts in the posterior frontal regions bilaterally, potentially embolic.   2.  No evidence for intracranial hemorrhage.   3.  Mild diffuse atrophy.   4.  Bilateral mastoid fluid, likely inflammatory.   5.  Acute and chronic paranasal sinus inflammatory changes.      MR-CERVICAL SPINE-W/O   Final Result      1.  Multilevel degenerative changes cervical spine with minimal reversal of curvature.   2.  Multilevel posterior disc bulging without evidence for cervical cord edema or myelopathy.   3.  Subtle findings raising concern for injury to the posterior longitudinal ligament at the C6-7 level with possible disruption of the disc as well.   4.  No epidural hematoma demonstrated.       DX-CHEST-PORTABLE (1 VIEW)   Final Result         1.  Pulmonary edema and/or infiltrates are identified, which are stable since the prior exam.   2.  Cardiomegaly   3.  Right rib fractures      NM-BILIARY (HIDA) SCAN WITH CCK   Final Result      Delayed activity within the gallbladder is nonspecific, possibly chronic cholecystitis in the appropriate clinical setting.      [id number] VIEW   Final Result      1. Air-filled colon without significant distention.      2. Feeding tube tip projects over the duodenum.      TRAUMA-LE VENOUS SCREENING   Final Result      DX-CHEST-PORTABLE (1 VIEW)   Final Result         1.  Pulmonary edema and/or infiltrates are identified, which are stable since the prior exam.   2.  Cardiomegaly      US-GALLBLADDER   Final Result      Cholelithiasis with mild gallbladder wall thickening and trace pericholecystic fluid. Findings can be seen in acute cholecystitis in the correct clinical setting. Gallbladder wall thickening can also be seen in hepatitis, cirrhosis, hypoproteinemia.      Enlarged, heterogeneous and echogenic appearance of the liver can be seen in hepatic steatosis or hepatocellular disease.      Limited evaluation of the pancreas and aorta which are obscured.         CT-CHEST,ABDOMEN,PELVIS W/O   Final Result      Small bilateral pleural effusions with overlying atelectasis/consolidation, right greater than left. Foci of air are seen within the pleural fluid on the right which may be related to the presence of the chest tube but can be seen in the setting of an    empyema. Cavitary consolidation is not excluded.      Patchy and ground glass opacities bilaterally are likely infectious/inflammatory.      No pneumothorax.      Mildly prominent mediastinal lymph nodes likely reactive.      Small amount of free fluid in the abdomen and pelvis.      Density within the gallbladder may represent vicarious excretion of contrast versus sludge. There is pericholecystic fluid. Further  evaluation can be obtained with right upper quadrant ultrasound.      There appears to be mild duodenal wall thickening which may be in can be seen in duodenitis or peptic ulcer disease.      Possible punctate nonobstructing left renal calculus.      Colonic diverticulosis.      Bladder is decompressed by a Roberts catheter. Bladder wall thickening not excluded. Correlation with urinalysis recommended.      Third spacing.      Multisegmented right-sided rib fractures.      Comminuted right clavicle fracture.      Splenomegaly.         DX-CHEST-PORTABLE (1 VIEW)   Final Result      Improving bibasilar atelectasis.      DX-CHEST-PORTABLE (1 VIEW)   Final Result      Bilateral airspace opacities are not significantly changed compared to prior.      Small pleural effusions are not excluded.      No pneumothorax is identified.      Right clavicle fracture and right-sided rib fractures are again noted.         CT-HEAD W/O   Final Result         1. No evidence of acute intracranial hemorrhage or mass lesion.      2. New complete opacification of the bilateral mastoid air cells. New air-fluid level in the sphenoid sinuses. Correlate for infection.         DX-CHEST-PORTABLE (1 VIEW)   Final Result      Stable chest findings compared to 6/8.      DX-CHEST-PORTABLE (1 VIEW)   Final Result      Stable chest appearance compared with 6/7.      DX-CHEST-PORTABLE (1 VIEW)   Final Result      Worsening bilateral airspace opacities.      DX-ABDOMEN FOR TUBE PLACEMENT   Final Result      1.  NG tube and feeding tube as described above.      DX-CHEST-PORTABLE (1 VIEW)   Final Result         1. Worsening left lower lung opacities could relate to worsening atelectasis, edema or infection.      DX-CHEST-PORTABLE (1 VIEW)   Final Result         1. No significant interval change.      DX-SMALL BOWEL SERIES   Final Result      No radiographic evidence of bowel obstruction      Prolonged contrast transit time to the colon indicates ileus       Findings were discussed with CARMEN KING on 6/4/2018 6:10 PM.      US-ABDOMEN COMPLETE SURVEY   Final Result      1.  Cholelithiasis   2.  Splenomegaly   3.  Enlarged portal vein   4.  These findings suggest portal hypertension   5.  Subcentimeter LEFT hepatic cyst   6.  Echogenic liver, a nonspecific finding that often is found to represent steatosis.  Other infiltrative processes could have an identical appearance.         ECHOCARDIOGRAM-COMP W/ CONT   Final Result      DX-CHEST-PORTABLE (1 VIEW)   Final Result         1. No significant interval change.      DX-CHEST-PORTABLE (1 VIEW)   Final Result         1.  Pulmonary edema and/or infiltrates are identified, which are stable since the prior exam.   2.  Decreased soft tissue gas in the right chest wall and neck.   3.  Right rib fractures   4.  Cardiomegaly            DX-CHEST-PORTABLE (1 VIEW)   Final Result      Right subclavian catheter placement with the tip projecting over the superior vena cava. No pneumothorax is identified. Exam is otherwise unchanged.      DX-CHEST-PORTABLE (1 VIEW)   Final Result         1.  Pulmonary edema and/or infiltrates are identified, which are stable since the prior exam.   2.  Decreased soft tissue gas in the right chest wall and neck.   3.  Right rib fractures   4.  Cardiomegaly         DX-CHEST-PORTABLE (1 VIEW)   Final Result      1.  Interval placement of RIGHT chest tube.   2.  No other significant change from prior exam.         DX-CHEST-PORTABLE (1 VIEW)   Final Result      1.  Endotracheal tube and enteric catheter has been placed and appear appropriately located      2.  Bilateral atelectasis and/or pulmonary contusion      3.  Right chest wall air      4.  Right rib fracture      DX-CHEST-PORTABLE (1 VIEW)   Final Result         1.  Pulmonary edema and/or infiltrates are identified, which appear somewhat increased since the prior exam.   2.  Stable soft tissue gas in the right chest wall and neck.   3.  Right rib  fractures      DX-CHEST-PORTABLE (1 VIEW)   Final Result         1.  Pulmonary edema and/or infiltrates are identified, which appear somewhat increased since the prior exam.   2.  Increased soft tissue gas in the right chest wall and neck.   3.  Right rib fractures      CT-CHEST,ABDOMEN,PELVIS WITH   Final Result      1.  Multiple right rib fractures including multisegmented fractures and displaced fourth through sixth rib fractures.   2.  Small hemopneumothorax.   3.  Atelectasis and or contusions within the right lung and within the left lower lobe.   4.  Comminuted angulated fracture of the distal right clavicle incompletely imaged.   5.  Right scapula is incompletely imaged.   6.  Aorta appears intact. No mediastinal or retroperitoneal hematoma.   7.  No intra-abdominal solid organ injury identified.   8.  Diverticulosis of the colon.   9.  No free fluid or free air.   10.  Hepatic steatosis and mild splenomegaly.   Findings were discussed with YANY CISNEROS on 5/30/2018 4:22 PM.      CT-LSPINE W/O PLUS RECONS   Final Result      Degenerative changes as above described.      Hepatic steatosis.      Colonic diverticulosis.      Small right hemothorax with overlying atelectasis/contusion.      CT-TSPINE W/O PLUS RECONS   Final Result      Minute right pneumothorax.      Small right hemothorax overlying atelectasis/contusion. Ground glass opacities in the right upper lobe likely represent small pulmonary contusions.      Soft tissue air in the posterior right hemithorax and right supraclavicular region.      Multiple right-sided rib fractures.      Degenerative changes in the thoracic spine.         CT-CSPINE WITHOUT PLUS RECONS   Final Result      Multilevel degenerative changes in the cervical spine.      Comminuted fractures of the right first, second and third ribs.      Patchy groundglass opacity at the right lung apex may represent a contusion.      Soft tissue air in the right supraclavicular region and  posterior right hemithorax.      Air-fluid level in the left maxillary sinus can be seen in acute sinusitis.      CT-HEAD W/O   Final Result      No acute intracranial abnormality is identified.      Paranasal sinus disease.      Frontal soft tissue swelling.         DX-CHEST-LIMITED (1 VIEW)   Final Result      1.  Multiple right rib fractures and possible right clavicle and scapular fractures.   2.  Possible trace right pneumothorax.   3.  Left lung base atelectasis.   Findings were discussed with YANY CISNEROS on 5/30/2018 3:36 PM.      DX-SHOULDER 2+ RIGHT   Final Result         1.  Normal shoulder series.      2.  Fractures of the right fourth through sixth ribs laterally.          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  I do believe IVIG post PLEX will help confer best chance at full recovery. IgA eval pend EXPECT RESULT TODAY to start IVIG safely.      ENCEPHALOPATHY, SUSPECT GBS/BICKERSTAFF ENCEPHALITIS, HEPATIC WITH CIRRHOSIS/HYPERAMMONEMIA CONCOMITANT  Ophthalmoplegia resolved  Hyperreflexia increasingly evident   Sensorium clearing  Punctate lesions on MRI brain could be due to same, or ischemic stroke in setting of trauma/acute hospitalization. MRA head wnl. MRA neck ordered today.  GQ1B ANTIBODY negative, this does not exclude diagnosis  On Keppra, EEG abnormal but no epileptiform- do not suspect sz but will keep prophylaxis until taper slow with epileptologist as otpt        No further neuro workup as inpatient if aforementioned studies WNL & continue  maintain/regain neuro baseline.  Neuro sign off, reconsult PRN.  F/u otpt Neuro ASAP.      I personally provided 35 minutes of total critical care time outside of time spent on separately billable/documented procedures. Time includes: review of laboratory data, review of radiology studies, discussion with consultants, discussion with family/patient, monitoring for potential decompensation.  Interventions were performed as documented above.              Kashmir Rush M.D.  , Neurohospitalist & Stroke  Clinical Professor, [employer]  Diplomate, Neurology & Neuroimaging

## 2018-07-01 NOTE — CARE PLAN
Problem: Safety  Goal: Will remain free from injury  Bed in low locked position, room near nurses station. Soft right and left wrist restraints in use, documentation in flowshets    Problem: Skin Integrity  Goal: Risk for impaired skin integrity will decrease  Q2 hour turning in place, pillows in use for support and positioning, mepilex in use. Following active wound order.

## 2018-07-02 ENCOUNTER — APPOINTMENT (OUTPATIENT)
Dept: RADIOLOGY | Facility: MEDICAL CENTER | Age: 63
DRG: 003 | End: 2018-07-02
Attending: SURGERY
Payer: COMMERCIAL

## 2018-07-02 LAB
ANION GAP SERPL CALC-SCNC: 10 MMOL/L (ref 0–11.9)
BASOPHILS # BLD AUTO: 0.5 % (ref 0–1.8)
BASOPHILS # BLD: 0.09 K/UL (ref 0–0.12)
BUN SERPL-MCNC: 47 MG/DL (ref 8–22)
CALCIUM SERPL-MCNC: 9.8 MG/DL (ref 8.5–10.5)
CHLORIDE SERPL-SCNC: 103 MMOL/L (ref 96–112)
CO2 SERPL-SCNC: 26 MMOL/L (ref 20–33)
CREAT SERPL-MCNC: 0.64 MG/DL (ref 0.5–1.4)
CRP SERPL HS-MCNC: 2.8 MG/DL (ref 0–0.75)
EOSINOPHIL # BLD AUTO: 0.49 K/UL (ref 0–0.51)
EOSINOPHIL NFR BLD: 2.5 % (ref 0–6.9)
ERYTHROCYTE [DISTWIDTH] IN BLOOD BY AUTOMATED COUNT: 43.8 FL (ref 35.9–50)
GLUCOSE SERPL-MCNC: 108 MG/DL (ref 65–99)
HCT VFR BLD AUTO: 31.8 % (ref 42–52)
HGB BLD-MCNC: 10.6 G/DL (ref 14–18)
IMM GRANULOCYTES # BLD AUTO: 0.39 K/UL (ref 0–0.11)
IMM GRANULOCYTES NFR BLD AUTO: 2 % (ref 0–0.9)
LYMPHOCYTES # BLD AUTO: 4.21 K/UL (ref 1–4.8)
LYMPHOCYTES NFR BLD: 21.8 % (ref 22–41)
MAGNESIUM SERPL-MCNC: 2.4 MG/DL (ref 1.5–2.5)
MCH RBC QN AUTO: 30.5 PG (ref 27–33)
MCHC RBC AUTO-ENTMCNC: 33.3 G/DL (ref 33.7–35.3)
MCV RBC AUTO: 91.4 FL (ref 81.4–97.8)
MONOCYTES # BLD AUTO: 1.39 K/UL (ref 0–0.85)
MONOCYTES NFR BLD AUTO: 7.2 % (ref 0–13.4)
NEUTROPHILS # BLD AUTO: 12.77 K/UL (ref 1.82–7.42)
NEUTROPHILS NFR BLD: 66 % (ref 44–72)
NRBC # BLD AUTO: 0 K/UL
NRBC BLD-RTO: 0 /100 WBC
PHOSPHATE SERPL-MCNC: 3.5 MG/DL (ref 2.5–4.5)
PLATELET # BLD AUTO: 377 K/UL (ref 164–446)
PMV BLD AUTO: 11 FL (ref 9–12.9)
POTASSIUM SERPL-SCNC: 3.9 MMOL/L (ref 3.6–5.5)
PREALB SERPL-MCNC: 20 MG/DL (ref 18–38)
RBC # BLD AUTO: 3.48 M/UL (ref 4.7–6.1)
SODIUM SERPL-SCNC: 139 MMOL/L (ref 135–145)
WBC # BLD AUTO: 19.3 K/UL (ref 4.8–10.8)

## 2018-07-02 PROCEDURE — 700102 HCHG RX REV CODE 250 W/ 637 OVERRIDE(OP): Performed by: SURGERY

## 2018-07-02 PROCEDURE — 160009 HCHG ANES TIME/MIN: Performed by: SURGERY

## 2018-07-02 PROCEDURE — A6402 STERILE GAUZE <= 16 SQ IN: HCPCS | Performed by: SURGERY

## 2018-07-02 PROCEDURE — 700111 HCHG RX REV CODE 636 W/ 250 OVERRIDE (IP): Performed by: SURGERY

## 2018-07-02 PROCEDURE — 99233 SBSQ HOSP IP/OBS HIGH 50: CPT | Mod: 25 | Performed by: SURGERY

## 2018-07-02 PROCEDURE — 700112 HCHG RX REV CODE 229: Performed by: SURGERY

## 2018-07-02 PROCEDURE — 501570 HCHG TROCAR, SEPARATOR: Performed by: SURGERY

## 2018-07-02 PROCEDURE — 87186 SC STD MICRODIL/AGAR DIL: CPT

## 2018-07-02 PROCEDURE — 87077 CULTURE AEROBIC IDENTIFY: CPT

## 2018-07-02 PROCEDURE — 501583 HCHG TROCAR, THRD CAN&SEAL 5X100: Performed by: SURGERY

## 2018-07-02 PROCEDURE — 700101 HCHG RX REV CODE 250

## 2018-07-02 PROCEDURE — 501623 HCHG TUBE, GASTROSTOMY: Performed by: SURGERY

## 2018-07-02 PROCEDURE — 87040 BLOOD CULTURE FOR BACTERIA: CPT | Mod: 91

## 2018-07-02 PROCEDURE — 94640 AIRWAY INHALATION TREATMENT: CPT

## 2018-07-02 PROCEDURE — 80048 BASIC METABOLIC PNL TOTAL CA: CPT

## 2018-07-02 PROCEDURE — 85025 COMPLETE CBC W/AUTO DIFF WBC: CPT

## 2018-07-02 PROCEDURE — 700111 HCHG RX REV CODE 636 W/ 250 OVERRIDE (IP)

## 2018-07-02 PROCEDURE — 87205 SMEAR GRAM STAIN: CPT

## 2018-07-02 PROCEDURE — A9270 NON-COVERED ITEM OR SERVICE: HCPCS | Performed by: SURGERY

## 2018-07-02 PROCEDURE — 84100 ASSAY OF PHOSPHORUS: CPT

## 2018-07-02 PROCEDURE — 160208 HCHG ENDO MINUTES - EA ADDL 1 MIN LEVEL 4: Performed by: SURGERY

## 2018-07-02 PROCEDURE — 87070 CULTURE OTHR SPECIMN AEROBIC: CPT

## 2018-07-02 PROCEDURE — 86140 C-REACTIVE PROTEIN: CPT

## 2018-07-02 PROCEDURE — 160203 HCHG ENDO MINUTES - 1ST 30 MINS LEVEL 4: Performed by: SURGERY

## 2018-07-02 PROCEDURE — 160048 HCHG OR STATISTICAL LEVEL 1-5: Performed by: SURGERY

## 2018-07-02 PROCEDURE — 71045 X-RAY EXAM CHEST 1 VIEW: CPT

## 2018-07-02 PROCEDURE — 302101 FENESTRATED FOAM: Performed by: SURGERY

## 2018-07-02 PROCEDURE — 94003 VENT MGMT INPAT SUBQ DAY: CPT

## 2018-07-02 PROCEDURE — 43653 LAPAROSCOPY GASTROSTOMY: CPT | Performed by: SURGERY

## 2018-07-02 PROCEDURE — 83735 ASSAY OF MAGNESIUM: CPT

## 2018-07-02 PROCEDURE — 770022 HCHG ROOM/CARE - ICU (200)

## 2018-07-02 PROCEDURE — 84134 ASSAY OF PREALBUMIN: CPT

## 2018-07-02 PROCEDURE — 502571 HCHG PACK, LAP CHOLE: Performed by: SURGERY

## 2018-07-02 PROCEDURE — 700105 HCHG RX REV CODE 258: Performed by: SURGERY

## 2018-07-02 PROCEDURE — 501838 HCHG SUTURE GENERAL: Performed by: SURGERY

## 2018-07-02 PROCEDURE — 0DH63UZ INSERTION OF FEEDING DEVICE INTO STOMACH, PERCUTANEOUS APPROACH: ICD-10-PCS | Performed by: SURGERY

## 2018-07-02 RX ORDER — LINEZOLID 600 MG/1
600 TABLET, FILM COATED ORAL EVERY 12 HOURS
Status: DISCONTINUED | OUTPATIENT
Start: 2018-07-02 | End: 2018-07-04 | Stop reason: HOSPADM

## 2018-07-02 RX ORDER — FUROSEMIDE 20 MG/1
20 TABLET ORAL
Status: DISCONTINUED | OUTPATIENT
Start: 2018-07-02 | End: 2018-07-04 | Stop reason: HOSPADM

## 2018-07-02 RX ORDER — GUAIFENESIN 600 MG/1
600 TABLET, EXTENDED RELEASE ORAL EVERY 12 HOURS
Status: DISCONTINUED | OUTPATIENT
Start: 2018-07-02 | End: 2018-07-02

## 2018-07-02 RX ORDER — BUPIVACAINE HYDROCHLORIDE AND EPINEPHRINE 5; 5 MG/ML; UG/ML
INJECTION, SOLUTION EPIDURAL; INTRACAUDAL; PERINEURAL
Status: DISCONTINUED | OUTPATIENT
Start: 2018-07-02 | End: 2018-07-02 | Stop reason: HOSPADM

## 2018-07-02 RX ADMIN — TRAZODONE HYDROCHLORIDE 300 MG: 150 TABLET ORAL at 20:33

## 2018-07-02 RX ADMIN — CEFEPIME 2 G: 2 INJECTION, POWDER, FOR SOLUTION INTRAMUSCULAR; INTRAVENOUS at 15:49

## 2018-07-02 RX ADMIN — ENOXAPARIN SODIUM 30 MG: 100 INJECTION SUBCUTANEOUS at 10:16

## 2018-07-02 RX ADMIN — GUAIFENESIN 200 MG: 100 SOLUTION ORAL at 20:33

## 2018-07-02 RX ADMIN — CEFEPIME 2 G: 2 INJECTION, POWDER, FOR SOLUTION INTRAMUSCULAR; INTRAVENOUS at 20:33

## 2018-07-02 RX ADMIN — FAMOTIDINE 20 MG: 20 TABLET ORAL at 19:46

## 2018-07-02 RX ADMIN — PROPRANOLOL HYDROCHLORIDE 10 MG: 10 TABLET ORAL at 14:28

## 2018-07-02 RX ADMIN — GUAIFENESIN 200 MG: 100 SOLUTION ORAL at 14:27

## 2018-07-02 RX ADMIN — PROPRANOLOL HYDROCHLORIDE 10 MG: 10 TABLET ORAL at 05:19

## 2018-07-02 RX ADMIN — DOCUSATE SODIUM 100 MG: 50 LIQUID ORAL at 19:46

## 2018-07-02 RX ADMIN — LEVETIRACETAM 1000 MG: 500 TABLET ORAL at 20:33

## 2018-07-02 RX ADMIN — SPIRONOLACTONE 25 MG: 25 TABLET, FILM COATED ORAL at 05:19

## 2018-07-02 RX ADMIN — RIFAXIMIN 550 MG: 550 TABLET ORAL at 10:16

## 2018-07-02 RX ADMIN — OXYCODONE HYDROCHLORIDE 5 MG: 5 TABLET ORAL at 19:46

## 2018-07-02 RX ADMIN — AMLODIPINE BESYLATE 5 MG: 10 TABLET ORAL at 10:16

## 2018-07-02 RX ADMIN — OXYCODONE HYDROCHLORIDE 5 MG: 5 TABLET ORAL at 14:30

## 2018-07-02 RX ADMIN — RIFAXIMIN 550 MG: 550 TABLET ORAL at 19:46

## 2018-07-02 RX ADMIN — FAMOTIDINE 20 MG: 20 TABLET ORAL at 10:16

## 2018-07-02 RX ADMIN — ENOXAPARIN SODIUM 30 MG: 100 INJECTION SUBCUTANEOUS at 20:32

## 2018-07-02 RX ADMIN — LEVETIRACETAM 1000 MG: 500 TABLET ORAL at 05:19

## 2018-07-02 RX ADMIN — LINEZOLID 600 MG: 600 TABLET, FILM COATED ORAL at 15:49

## 2018-07-02 RX ADMIN — SPIRONOLACTONE 25 MG: 25 TABLET, FILM COATED ORAL at 14:28

## 2018-07-02 RX ADMIN — LEVETIRACETAM 1000 MG: 500 TABLET ORAL at 14:27

## 2018-07-02 RX ADMIN — FUROSEMIDE 20 MG: 20 TABLET ORAL at 10:16

## 2018-07-02 RX ADMIN — PROPRANOLOL HYDROCHLORIDE 10 MG: 10 TABLET ORAL at 20:33

## 2018-07-02 RX ADMIN — LINEZOLID 600 MG: 600 TABLET, FILM COATED ORAL at 19:46

## 2018-07-02 NOTE — PROGRESS NOTES
Spoke with Dr Hart based on notes from Dr. Rush and pt assessment Dr. Hart does not think IVIG therapy is necessary at this time. Will notify Dr Castillo.

## 2018-07-02 NOTE — DISCHARGE PLANNING
Agency/Facility Name: [facility]  Outcome: Updated progress notes and labs faxed to [facility] @ f:[phone] per Whitley's(LSW) request.

## 2018-07-02 NOTE — PROGRESS NOTES
Updated Alissa from [facility] on plan of care.
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
## 2018-07-02 NOTE — CARE PLAN
Problem: Skin Integrity  Goal: Risk for impaired skin integrity will decrease    Intervention: Assess risk factors for impaired skin integrity and/or pressure ulcers  Q 2 hour turning pillows for support and position

## 2018-07-02 NOTE — CARE PLAN
Problem: Oxygenation:  Goal: Maintain adequate oxygenation dependent on patient condition  Outcome: PROGRESSING AS EXPECTED    Intervention: Levels of oxygenation will improve to baseline  tpiece 6L/35%      Problem: Bronchopulmonary Hygiene:  Goal: Increase mobilization of retained secretions  Outcome: PROGRESSING AS EXPECTED
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
## 2018-07-02 NOTE — CARE PLAN
Problem: Safety  Goal: Will remain free from injury    Intervention: Provide assistance with mobility  Bed in low locked position, room near nurses station, soft right and left wrist restraints in use

## 2018-07-02 NOTE — THERAPY
Speech Therapy contact note:     Orders received for FEES. FEES held 2/2 patient now scheduled for a PEG and therefore FEES is contraindicated given NPO status. SLP following closely and will complete diagnostic swallow evaluation as medically appropriate. Thank you.

## 2018-07-02 NOTE — DISCHARGE PLANNING
Medical Social Work    SW spoke to Cheryl ([phone]) with [facility] who stated that she is waiting to hear back from Omayra with [facility] on their assessment of pt.    JUJU called Omayra ([phone]) who stated she is working on completing the assessment today and will send over to insurance as soon as possible.     Plan: Waiting acceptance/insurance auth for pt to go to [facility].

## 2018-07-02 NOTE — PROGRESS NOTES
Pt back from OR on ventilator post general anesthesia. Placed back on ICU monitor. report received from OR RN and anesthesiologist. New G tube placed on LUQ site clean dry an intact  2 RN Skin assessment completed.     This RN will notify Pt family of return to ICU room.

## 2018-07-03 ENCOUNTER — APPOINTMENT (OUTPATIENT)
Dept: RADIOLOGY | Facility: MEDICAL CENTER | Age: 63
DRG: 003 | End: 2018-07-03
Attending: SURGERY
Payer: COMMERCIAL

## 2018-07-03 VITALS
HEIGHT: 74 IN | OXYGEN SATURATION: 94 % | RESPIRATION RATE: 26 BRPM | WEIGHT: 200.62 LBS | BODY MASS INDEX: 25.75 KG/M2 | SYSTOLIC BLOOD PRESSURE: 131 MMHG | HEART RATE: 70 BPM | DIASTOLIC BLOOD PRESSURE: 84 MMHG | TEMPERATURE: 98.4 F

## 2018-07-03 LAB
AMMONIA PLAS-SCNC: 41 UMOL/L (ref 11–45)
ANION GAP SERPL CALC-SCNC: 10 MMOL/L (ref 0–11.9)
BASOPHILS # BLD AUTO: 0.5 % (ref 0–1.8)
BASOPHILS # BLD: 0.1 K/UL (ref 0–0.12)
BUN SERPL-MCNC: 46 MG/DL (ref 8–22)
CALCIUM SERPL-MCNC: 9.3 MG/DL (ref 8.5–10.5)
CHLORIDE SERPL-SCNC: 102 MMOL/L (ref 96–112)
CO2 SERPL-SCNC: 25 MMOL/L (ref 20–33)
CREAT SERPL-MCNC: 0.74 MG/DL (ref 0.5–1.4)
EOSINOPHIL # BLD AUTO: 0.42 K/UL (ref 0–0.51)
EOSINOPHIL NFR BLD: 2.2 % (ref 0–6.9)
ERYTHROCYTE [DISTWIDTH] IN BLOOD BY AUTOMATED COUNT: 45.1 FL (ref 35.9–50)
GLUCOSE SERPL-MCNC: 110 MG/DL (ref 65–99)
GRAM STN SPEC: NORMAL
HCT VFR BLD AUTO: 30 % (ref 42–52)
HGB BLD-MCNC: 9.7 G/DL (ref 14–18)
IGA SERPL-MCNC: 371 MG/DL (ref 68–408)
IMM GRANULOCYTES # BLD AUTO: 0.28 K/UL (ref 0–0.11)
IMM GRANULOCYTES NFR BLD AUTO: 1.5 % (ref 0–0.9)
LYMPHOCYTES # BLD AUTO: 5.06 K/UL (ref 1–4.8)
LYMPHOCYTES NFR BLD: 27 % (ref 22–41)
MCH RBC QN AUTO: 30.1 PG (ref 27–33)
MCHC RBC AUTO-ENTMCNC: 32.3 G/DL (ref 33.7–35.3)
MCV RBC AUTO: 93.2 FL (ref 81.4–97.8)
MONOCYTES # BLD AUTO: 1.41 K/UL (ref 0–0.85)
MONOCYTES NFR BLD AUTO: 7.5 % (ref 0–13.4)
NEUTROPHILS # BLD AUTO: 11.48 K/UL (ref 1.82–7.42)
NEUTROPHILS NFR BLD: 61.3 % (ref 44–72)
NRBC # BLD AUTO: 0 K/UL
NRBC BLD-RTO: 0 /100 WBC
PLATELET # BLD AUTO: 329 K/UL (ref 164–446)
PMV BLD AUTO: 11 FL (ref 9–12.9)
POTASSIUM SERPL-SCNC: 4 MMOL/L (ref 3.6–5.5)
RBC # BLD AUTO: 3.22 M/UL (ref 4.7–6.1)
SIGNIFICANT IND 70042: NORMAL
SITE SITE: NORMAL
SODIUM SERPL-SCNC: 137 MMOL/L (ref 135–145)
SOURCE SOURCE: NORMAL
WBC # BLD AUTO: 18.8 K/UL (ref 4.8–10.8)

## 2018-07-03 PROCEDURE — 97530 THERAPEUTIC ACTIVITIES: CPT

## 2018-07-03 PROCEDURE — 85025 COMPLETE CBC W/AUTO DIFF WBC: CPT

## 2018-07-03 PROCEDURE — 700102 HCHG RX REV CODE 250 W/ 637 OVERRIDE(OP): Performed by: SURGERY

## 2018-07-03 PROCEDURE — 99291 CRITICAL CARE FIRST HOUR: CPT | Performed by: SURGERY

## 2018-07-03 PROCEDURE — 82140 ASSAY OF AMMONIA: CPT

## 2018-07-03 PROCEDURE — A9270 NON-COVERED ITEM OR SERVICE: HCPCS | Performed by: SURGERY

## 2018-07-03 PROCEDURE — 700105 HCHG RX REV CODE 258: Performed by: SURGERY

## 2018-07-03 PROCEDURE — 700112 HCHG RX REV CODE 229: Performed by: SURGERY

## 2018-07-03 PROCEDURE — 97535 SELF CARE MNGMENT TRAINING: CPT

## 2018-07-03 PROCEDURE — 700111 HCHG RX REV CODE 636 W/ 250 OVERRIDE (IP): Performed by: SURGERY

## 2018-07-03 PROCEDURE — 94640 AIRWAY INHALATION TREATMENT: CPT

## 2018-07-03 PROCEDURE — 71045 X-RAY EXAM CHEST 1 VIEW: CPT

## 2018-07-03 PROCEDURE — 80048 BASIC METABOLIC PNL TOTAL CA: CPT

## 2018-07-03 RX ADMIN — GUAIFENESIN 200 MG: 100 SOLUTION ORAL at 05:04

## 2018-07-03 RX ADMIN — FAMOTIDINE 20 MG: 20 TABLET ORAL at 08:45

## 2018-07-03 RX ADMIN — OXYCODONE HYDROCHLORIDE 5 MG: 5 TABLET ORAL at 00:49

## 2018-07-03 RX ADMIN — LORAZEPAM 2 MG: 2 INJECTION INTRAMUSCULAR; INTRAVENOUS at 15:55

## 2018-07-03 RX ADMIN — SPIRONOLACTONE 25 MG: 25 TABLET, FILM COATED ORAL at 05:04

## 2018-07-03 RX ADMIN — ENOXAPARIN SODIUM 30 MG: 100 INJECTION SUBCUTANEOUS at 08:45

## 2018-07-03 RX ADMIN — RIFAXIMIN 550 MG: 550 TABLET ORAL at 08:45

## 2018-07-03 RX ADMIN — AMLODIPINE BESYLATE 5 MG: 10 TABLET ORAL at 10:53

## 2018-07-03 RX ADMIN — LEVETIRACETAM 1000 MG: 500 TABLET ORAL at 14:36

## 2018-07-03 RX ADMIN — FUROSEMIDE 20 MG: 20 TABLET ORAL at 08:45

## 2018-07-03 RX ADMIN — LINEZOLID 600 MG: 600 TABLET, FILM COATED ORAL at 08:45

## 2018-07-03 RX ADMIN — LEVETIRACETAM 1000 MG: 500 TABLET ORAL at 05:04

## 2018-07-03 RX ADMIN — PROPRANOLOL HYDROCHLORIDE 10 MG: 10 TABLET ORAL at 05:04

## 2018-07-03 RX ADMIN — CEFEPIME 2 G: 2 INJECTION, POWDER, FOR SOLUTION INTRAMUSCULAR; INTRAVENOUS at 14:36

## 2018-07-03 RX ADMIN — GUAIFENESIN 200 MG: 100 SOLUTION ORAL at 10:53

## 2018-07-03 RX ADMIN — SPIRONOLACTONE 25 MG: 25 TABLET, FILM COATED ORAL at 15:56

## 2018-07-03 RX ADMIN — DOCUSATE SODIUM 100 MG: 50 LIQUID ORAL at 08:45

## 2018-07-03 RX ADMIN — GUAIFENESIN 200 MG: 100 SOLUTION ORAL at 02:00

## 2018-07-03 RX ADMIN — GUAIFENESIN 200 MG: 100 SOLUTION ORAL at 14:36

## 2018-07-03 RX ADMIN — CEFEPIME 2 G: 2 INJECTION, POWDER, FOR SOLUTION INTRAMUSCULAR; INTRAVENOUS at 05:05

## 2018-07-03 RX ADMIN — PROPRANOLOL HYDROCHLORIDE 10 MG: 10 TABLET ORAL at 14:36

## 2018-07-03 ASSESSMENT — GAIT ASSESSMENTS: GAIT LEVEL OF ASSIST: UNABLE TO PARTICIPATE

## 2018-07-03 ASSESSMENT — COGNITIVE AND FUNCTIONAL STATUS - GENERAL
TOILETING: TOTAL
DRESSING REGULAR UPPER BODY CLOTHING: TOTAL
EATING MEALS: TOTAL
TURNING FROM BACK TO SIDE WHILE IN FLAT BAD: UNABLE
WALKING IN HOSPITAL ROOM: TOTAL
PERSONAL GROOMING: A LOT
SUGGESTED CMS G CODE MODIFIER MOBILITY: CN
SUGGESTED CMS G CODE MODIFIER DAILY ACTIVITY: CM
CLIMB 3 TO 5 STEPS WITH RAILING: TOTAL
DRESSING REGULAR LOWER BODY CLOTHING: TOTAL
DAILY ACTIVITIY SCORE: 7
MOBILITY SCORE: 6
HELP NEEDED FOR BATHING: TOTAL
STANDING UP FROM CHAIR USING ARMS: TOTAL
MOVING TO AND FROM BED TO CHAIR: UNABLE
MOVING FROM LYING ON BACK TO SITTING ON SIDE OF FLAT BED: UNABLE
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
## 2018-07-03 NOTE — DISCHARGE PLANNING
Received Transport Form @ 5529 from [facility])  Spoke to Andrew @ [facility]    Transport is scheduled for 7/3 @ 1700 [facility] notified of transport time.

## 2018-07-03 NOTE — THERAPY
"Continues to be alert and agreeable to mobility. Pt grimaced and nodded to pain during mobility following PEG tube placement yesterday. Pt tolerated sitting EOB x20 min. Limited command following with delay allowed. Pt declined to attempt standing today due to abd pain from PEG. Pt will continue to benefit and progress with acute PT interventions.    Physical Therapy Treatment completed.   Bed Mobility:  Supine to Sit: Maximal Assist (x2)  Transfers: Sit to Stand: Unable to Participate  Gait: Level Of Assist: Unable to Participate       Plan of Care: Will benefit from Physical Therapy 3 times per week  Discharge Recommendations: Equipment: Will Continue to Assess for Equipment Needs. Post-acute therapy Discharge to a transitional care facility for continued therapy services.     See \"Rehab Therapy-Acute\" Patient Summary Report for complete documentation.       "

## 2018-07-03 NOTE — DISCHARGE PLANNING
Medical Social Work     JUJU spoke to Christophe ([phone]) with [employer] who stated that she had concerns that the pt did not move on Friday to LTAC. JUJU explained that pt needed peg tube for placement at LTAC. Christophe expressed continued frustrations that pt was not back in network. SW explained that pt was not medically cleared for discharge until 7/03. JUJU explained that updated clinicals have been sent to [facility] multiple times and are just pending insurance auth at this point. Christophe requested last 72-hours of clinicals be faxed over to ([phone]) to verify that pt meets inpatient criteria. JUJU reiterated that pt was not medically cleared for transfer to LTAC until this morning. JUJU called bed opal- Len and updated him that pt's insurance had concerns about pt not meeting in-patient criteria. Len informed this SW that he will be sending clinicals to insurance as well.     JUJU spoke to Omayra ([phone]) with [facility] in [place], [place] who stated everything has been submitted to her admissions office and they will be sending over clinical packet to Cheryl ([phone]) with [facility] who will review and let this SWer know when auth has been received.     Plan: Pt will discharge to [facility] once insurance auth has been received.

## 2018-07-03 NOTE — DISCHARGE PLANNING
Medical Social Work    SW spoke to Omayra ([phone]) with [facility] in [place], [place] who stated pt has been accepted to their facility and they have received authorization from Insurance. Omayra stated that she will need recent MAR and culture sensitivities in order to assign pt a bed. SW over updated clinicals. Waiting bed assignment.     JUJU spoke to Christophe ([phone]) with [facility] who stated that if there are any issues with transfer today to call her and she will make sure transfer happens.     Pt is scheduled for [facility] at 1700 to [facility]. D/C packet and COBRA is complete. SW awaiting d/c summary from MD.     Plan: As above, pt will go to [facility] at 1700 via [facility].

## 2018-07-03 NOTE — DISCHARGE INSTRUCTIONS
Discharge Instructions    Discharged to other by ambulance with escort. Discharged via ambulance, hospital escort: Yes.  Special equipment needed: Oxygen    Be sure to schedule a follow-up appointment with your primary care doctor or any specialists as instructed.     Discharge Plan:   Diet Plan: Discussed  Activity Level: Discussed  Confirmed Follow up Appointment: No (Comments) (transfering to [facility])  Confirmed Symptoms Management: Discussed  Medication Reconciliation Updated: Yes  Pneumococcal Vaccine Administered/Refused: Not given - Patient refused pneumococcal vaccine  Influenza Vaccine Indication: Not indicated: Previously immunized this influenza season and > 8 years of age    I understand that a diet low in cholesterol, fat, and sodium is recommended for good health. Unless I have been given specific instructions below for another diet, I accept this instruction as my diet prescription.   Other diet: Replete with fiber    Special Instructions: Discharge instructions for the Orthopedic Patient    Follow up with Primary Care Physician within 2 weeks of discharge to home, regarding:  Review of medications and diagnostic testing.  Surveillance for medical complications.  Workup and treatment of osteoporosis, if appropriate.     -Is this a Joint Replacement patient? No    -Is this patient being discharged with medication to prevent blood clots?  Yes, Lovenox Enoxaparin injection  What is this medicine?  ENOXAPARIN (ee nox a PA rin) is used after knee, hip, or abdominal surgeries to prevent blood clotting. It is also used to treat existing blood clots in the lungs or in the veins.  This medicine may be used for other purposes; ask your health care provider or pharmacist if you have questions.  COMMON BRAND NAME(S): Lovenox  What should I tell my health care provider before I take this medicine?  They need to know if you have any of these conditions:  -bleeding disorders, hemorrhage, or  hemophilia  -infection of the heart or heart valves  -kidney or liver disease  -previous stroke  -prosthetic heart valve  -recent surgery or delivery of a baby  -ulcer in the stomach or intestine, diverticulitis, or other bowel disease  -an unusual or allergic reaction to enoxaparin, heparin, pork or pork products, other medicines, foods, dyes, or preservatives  -pregnant or trying to get pregnant  -breast-feeding  How should I use this medicine?  This medicine is for injection under the skin. It is usually given by a health-care professional. You or a family member may be trained on how to give the injections. If you are to give yourself injections, make sure you understand how to use the syringe, measure the dose if necessary, and give the injection. To avoid bruising, do not rub the site where this medicine has been injected. Do not take your medicine more often than directed. Do not stop taking except on the advice of your doctor or health care professional.  Make sure you receive a puncture-resistant container to dispose of the needles and syringes once you have finished with them. Do not reuse these items. Return the container to your doctor or health care professional for proper disposal.  Talk to your pediatrician regarding the use of this medicine in children. Special care may be needed.  Overdosage: If you think you have taken too much of this medicine contact a poison control center or emergency room at once.  NOTE: This medicine is only for you. Do not share this medicine with others.  What if I miss a dose?  If you miss a dose, take it as soon as you can. If it is almost time for your next dose, take only that dose. Do not take double or extra doses.  What may interact with this medicine?  -aspirin and aspirin-like medicines  -certain medicines that treat or prevent blood clots  -dipyridamole  -NSAIDs, medicines for pain and inflammation, like ibuprofen or naproxen  This list may not describe all  possible interactions. Give your health care provider a list of all the medicines, herbs, non-prescription drugs, or dietary supplements you use. Also tell them if you smoke, drink alcohol, or use illegal drugs. Some items may interact with your medicine.  What should I watch for while using this medicine?  Visit your doctor or health care professional for regular checks on your progress. Your condition will be monitored carefully while you are receiving this medicine.  Notify your doctor or health care professional and seek emergency treatment if you develop breathing problems; changes in vision; chest pain; severe, sudden headache; pain, swelling, warmth in the leg; trouble speaking; sudden numbness or weakness of the face, arm, or leg. These can be signs that your condition has gotten worse.  If you are going to have surgery, tell your doctor or health care professional that you are taking this medicine.  Do not stop taking this medicine without first talking to your doctor. Be sure to refill your prescription before you run out of medicine.  Avoid sports and activities that might cause injury while you are using this medicine. Severe falls or injuries can cause unseen bleeding. Be careful when using sharp tools or knives. Consider using an electric razor. Take special care brushing or flossing your teeth. Report any injuries, bruising, or red spots on the skin to your doctor or health care professional.  What side effects may I notice from receiving this medicine?  Side effects that you should report to your doctor or health care professional as soon as possible:  -allergic reactions like skin rash, itching or hives, swelling of the face, lips, or tongue  -feeling faint or lightheaded, falls  -signs and symptoms of bleeding such as bloody or black, tarry stools; red or dark-brown urine; spitting up blood or brown material that looks like coffee grounds; red spots on the skin; unusual bruising or bleeding from  the eye, gums, or nose  Side effects that usually do not require medical attention (report to your doctor or health care professional if they continue or are bothersome):  -pain, redness, or irritation at site where injected  This list may not describe all possible side effects. Call your doctor for medical advice about side effects. You may report side effects to FDA at 0-696-XCG-0072.  Where should I keep my medicine?  Keep out of the reach of children.  Store at room temperature between 15 and 30 degrees C (59 and 86 degrees F). Do not freeze. If your injections have been specially prepared, you may need to store them in the refrigerator. Ask your pharmacist. Throw away any unused medicine after the expiration date.  NOTE: This sheet is a summary. It may not cover all possible information. If you have questions about this medicine, talk to your doctor, pharmacist, or health care provider.  © 2018 Elsevier/Gold Standard (2015-04-21 16:06:21)      · Is patient discharged on Warfarin / Coumadin?   No     Depression / Suicide Risk    As you are discharged from this [facility] facility, it is important to learn how to keep safe from harming yourself.    Recognize the warning signs:  · Abrupt changes in personality, positive or negative- including increase in energy   · Giving away possessions  · Change in eating patterns- significant weight changes-  positive or negative  · Change in sleeping patterns- unable to sleep or sleeping all the time   · Unwillingness or inability to communicate  · Depression  · Unusual sadness, discouragement and loneliness  · Talk of wanting to die  · Neglect of personal appearance   · Rebelliousness- reckless behavior  · Withdrawal from people/activities they love  · Confusion- inability to concentrate     If you or a loved one observes any of these behaviors or has concerns about self-harm, here's what you can do:  · Talk about it- your feelings and reasons for harming  yourself  · Remove any means that you might use to hurt yourself (examples: pills, rope, extension cords, firearm)  · Get professional help from the community (Mental Health, Substance Abuse, psychological counseling)  · Do not be alone:Call your Safe Contact- someone whom you trust who will be there for you.  · Call your local CRISIS HOTLINE 567-2084 or 858-237-1345  · Call your local Children's Mobile Crisis Response Team [place] (410) 336-3622 or www.[employer]  · Call the toll free National Suicide Prevention Hotlines   · National Suicide Prevention Lifeline 925-906-DARN (1674)  · National Hope Line Network 800-SUICIDE (751-2294)

## 2018-07-03 NOTE — PROGRESS NOTES
Report called to Jillian MELTON at [facility].  Questions and concerns addressed with RN.  Pt wife updated on plan for transport at 1700.

## 2018-07-03 NOTE — DISCHARGE PLANNING
Medical Social Work    Pt is leaving via [facility] at 1500 to [facility] in [place], [place]. D/C packet and summary, COBRA are completed and with bedside RN.
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
## 2018-07-03 NOTE — THERAPY
"Occupational Therapy Treatment completed with focus on ADLs, ADL transfers, patient education, cognition and upper extremity function.  Functional Status:  Max A x2 supine>sit EOB, significantly improved sitting balance EOB, able to sit self supported w/intermittent CGA, tends to have posterior lean w/fatigue. With hand over hand assist and facilitation, pt participated in oral care w/max A, when given a wet wash cloth pt initiated grabbing and bringing to face w/his LUE, appears to be less interactive w/RUE. Remains w ability to follow 1 step commands w/increased time to process, and ~50% consistent. Noted blank stare at times, but redirects w/vc w/his name, BTB post session w/max A x2   Plan of Care: Will benefit from Occupational Therapy 3 times per week  Discharge Recommendations:  Equipment Will Continue to Assess for Equipment Needs. Post-acute therapy Discharge to a transitional care facility for continued skilled therapy services.    See \"Rehab Therapy-Acute\" Patient Summary Report for complete documentation.   "

## 2018-07-03 NOTE — DIETARY
Nutrition support weekly update:  Day 34 of admit.  Devonte Sotelo is a 63 y.o. male with admitting DX of multiple R-sided rib fractures. Tube feeding initiated on . Current TF via (route) PEG is Peptamen Intense VHP @60ml/hr = 1440kcals, 134g protein, and 1152ml free water (15.8kcals/kg of CBW - 91kg; 1.55g of pro/kg of IBW - 86.4kg).     Assessment:  Weight: 91kg (200#) Recent wt change: 51# (20%) consistent severe wt loss x 2 weeks - ?fluid status vs scale error.   Per I/O: -24L since    Re-estimate of nutritional needs 2' pt w/ recent wt loss, BMI @25, and recent G-tube placement will attempt to transition to standard formula.     Calculation/Equation: REE per MSJ x 1.2 injury factor   Calories/day: 1775 - 2125 kcals (19.5 - 23.4 kcals/kg admit stated wt)  Protein/day: 110 - 137 g (Grams/k.2 - 1.5)    Evaluation:   1. Pt w/ recent G-tube placement POD #1 - and TF re-initiated at goal w/ Peptamen Intense VHP @60ml/hr.   2. Current TF formula and rate no longer indicated at this time 2' pt w/ recent wt loss (most likely r/t fluid status vs scale error) - however, wt consistently decreasing. Therefore, will transition to fiber based standard formula.   3. Last BM  - fiber to benefit w/ stimulating BM.     Recommendations/Plan:  1. Change TF formula/rate to Replete w/ Fiber @75ml/hr = 1800kcals, 112g protein, and 1512ml free water (19.8kcals/kg, 1.2g of pro/kg of CBW - 91kg).   2. RD to monitor TF at new goal/tolerance, wt, and labs to adjust TF accordingly.

## 2018-07-03 NOTE — DISCHARGE PLANNING
Medical Social Work     Ongoing: JUJU spoke w/ Cheryl ([phone]) with [facility] who stated that she was hopeful authorization/YODIT would take place today and requested that SW begin the d/c process. JUJU completed [facility] PCS requesting 5 pm transport to [facility] in [place], [place]. Faxed PCS to [facility], received completion.     JUJU spoke with Brenda in [facility] admissions ([phone]) who stated they are just waiting on the YODIT and she will call the SW as soon as she knows.     Plan: JUJU scheduled [facility] for 5 pm transport pending YODIT completion and [facility] can accommodate transport time.

## 2018-07-03 NOTE — CARE PLAN
Problem: Pain Management  Goal: Pain level will decrease to patient's comfort goal  Medicated per MAR for pain      Problem: Skin Integrity  Goal: Risk for impaired skin integrity will decrease  Q2H turns in place, heels floated on pillows, pillows for positioning, lines and tubes monitored and repositioned prn
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
## 2018-07-04 LAB
BACTERIA SPEC RESP CULT: ABNORMAL
BACTERIA SPEC RESP CULT: ABNORMAL
GRAM STN SPEC: ABNORMAL
SIGNIFICANT IND 70042: ABNORMAL
SITE SITE: ABNORMAL
SOURCE SOURCE: ABNORMAL

## 2018-07-04 NOTE — CARE PLAN
Problem: Oxygenation:  Goal: Maintain adequate oxygenation dependent on patient condition  T-Piece 6 LPM and 35% FiO2    Problem: Bronchopulmonary Hygiene:  Goal: Increase mobilization of retained secretions    Intervention: Perform airway suctioning  In-line suctioning PRN  Intervention: Perform actions to maintain patient airway  6.0 cuffed Shiley trache placed today

## 2018-07-04 NOTE — PROGRESS NOTES
[facility] transport at bedside, report given to Jillian EMT.  Jillian given instruction on suctioning pt and trouble shooting tips for trach, return demonstration completed with suctioning.  All questions and concerns addressed.  Pt wife at bedside updated on transport time and arrival, all questions and concerns addressed at this time with Reshma, no additional needs.  All personal belongings sent with pt and wife.

## 2018-07-07 LAB
BACTERIA BLD CULT: NORMAL
BACTERIA BLD CULT: NORMAL
SIGNIFICANT IND 70042: NORMAL
SIGNIFICANT IND 70042: NORMAL
SITE SITE: NORMAL
SITE SITE: NORMAL
SOURCE SOURCE: NORMAL
SOURCE SOURCE: NORMAL

## 2018-07-12 NOTE — OP REPORT
DATE OF SERVICE:  06/04/2018    PREOPERATIVE DIAGNOSIS:  Hemopneumothorax.    POSTOPERATIVE DIAGNOSIS:  Hemopneumothorax.    OPERATIONS:  1.  Right chest tube thoracostomy.  2.  Multiple intercostal blocks.    SURGEON:  Des Ramirez MD    ANESTHESIA:  1% Xylocaine.    INDICATIONS:  Right pleural effusion.  Ventilator dependence, currently on   APRV and critically ill.  Consent obtained from guardians.    OPERATION IN DETAIL:  Right chest prepped and draped in sterile fashion.    Intercostal blocks were performed using Xylocaine.  5-6 interspace was used to   enter the chest cavity bluntly.  A #28 chest tube was then threaded easily   into the chest cavity without difficulty.  There was a return of   serosanguineous fluid.  The chest tube was secured using 0 Ethibond.  Sterile   dressings applied.  Patient tolerated the procedure well.       ____________________________________     MD NOELLE MORRIS / KIKA    DD:  07/11/2018 16:29:59  DT:  07/11/2018 16:42:23    D#:  [id number]  Job#:  [id number]

## 2018-07-16 NOTE — OP REPORT
Preoperative Diagnosis:  Respiratory failure pleural effusion    Postoperative Diagnosis :  Same      PROCEDURES PERFORMED:   1. Right tube thoracostomy.   2. Intercostal nerve blocks, multiple.     SURGEON: Des Ramirez MD, FACS     INDICATIONS: The patient is a 63 y.o. male with a hemopneumothorax      FINDINGS:     PROCEDURE: Following implied emergent consent, the patient was properly identified and optimally positioned. A moderate sedation consisting of intravenous fentanyl and midazolam was administered. The  chest was widely prepped and draped into a sterile field.     Local anesthetic was used to infiltrate the chest tube site. Multiple intercostal nerve blocks were placed above and below the chest tube site dermatome. A 1.5-cm transverse incision was made and the subcutaneous tissue spread bluntly. The thoracic cavity was accessed bluntly over the rib and a 32-Hungarian chest tube placed in a anterior, midaxillary line apical orientation and secured to the skin with a 0-Ethibond  suture.     The chest tube was connected to close drainage suction and sterile dressing   was applied. The patient tolerated the procedure well. There were no apparent   complications.   ____________________________________   Des Ramirez MD, FACS

## 2018-08-09 NOTE — ADDENDUM NOTE
Encounter addended by: Dian Ridley, RRT on: 8/9/2018  9:21 AM<BR>    Actions taken: Flowsheet accepted

## (undated) DEVICE — TUBING CLEARLINK DUO-VENT - C-FLO (48EA/CA)

## (undated) DEVICE — ELECTRODE 850 FOAM ADHESIVE - HYDROGEL RADIOTRNSPRNT (50/PK)

## (undated) DEVICE — SUTURE 4-0 MONOCRYL PLUS PS-2 - 27 INCH (36/BX)

## (undated) DEVICE — LACTATED RINGERS INJ 1000 ML - (14EA/CA 60CA/PF)

## (undated) DEVICE — NEPTUNE 4 PORT MANIFOLD - (20/PK)

## (undated) DEVICE — KIT ANESTHESIA W/CIRCUIT & 3/LT BAG W/FILTER (20EA/CA)

## (undated) DEVICE — SUTURE GENERAL

## (undated) DEVICE — TROCAR 5X100 NON BLADED Z-TH - READ KII (6/BX)

## (undated) DEVICE — MEDICINE CUP STERILE 2 OZ - (100/CA)

## (undated) DEVICE — PROTECTOR ULNA NERVE - (36PR/CA)

## (undated) DEVICE — WATER IRRIG. STER. 1500 ML - (9/CA)

## (undated) DEVICE — BLADE SURGICAL CLIPPER - (50EA/CA)

## (undated) DEVICE — SPONGE DRAIN 4 X 4IN 6-PLY - (2/PK25PK/BX12BX/CS)

## (undated) DEVICE — CANNULA W/SEAL 5X100 Z-THRE - ADED KII (12/BX)

## (undated) DEVICE — SUCTION INSTRUMENT YANKAUER BULBOUS TIP W/O VENT (50EA/CA)

## (undated) DEVICE — BLADE SURGICAL #15 - (50/BX 3BX/CA)

## (undated) DEVICE — JELLY SURGILUBE STERILE TUBE 4.25 OZ (1/EA)

## (undated) DEVICE — TUBE GASTROSTOMY 16FR 20CC - INACTIVE USE #18732

## (undated) DEVICE — HEAD HOLDER JUNIOR/ADULT

## (undated) DEVICE — CANISTER SUCTION 3000ML MECHANICAL FILTER AUTO SHUTOFF MEDI-VAC NONSTERILE LF DISP  (40EA/CA)

## (undated) DEVICE — KIT ROOM DECONTAMINATION

## (undated) DEVICE — SODIUM CHL IRRIGATION 0.9% 1000ML (12EA/CA)

## (undated) DEVICE — SENSOR SPO2 NEO LNCS ADHESIVE (20/BX) SEE USER NOTES

## (undated) DEVICE — DRESSING TRANSPARENT FILM TEGADERM 2.375 X 2.75"  (100EA/BX)"

## (undated) DEVICE — ELECTRODE DUAL RETURN W/ CORD - (50/PK)

## (undated) DEVICE — SYRINGE 50ML CATHETER TIP (40EA/BX 4BX/CA)

## (undated) DEVICE — SLEEVE, VASO, THIGH, MED

## (undated) DEVICE — DRESSING TRANSPARENT FILM TEGADERM 4 X 4.75" (50EA/BX)"

## (undated) DEVICE — SET EXTENSION WITH 2 PORTS (48EA/CA) ***PART #2C8610 IS A SUBSTITUTE*****

## (undated) DEVICE — KIT 18FR INITIAL PLACEMENT - (1/CA)

## (undated) DEVICE — PACK LAP CHOLE OR - (2EA/CA)

## (undated) DEVICE — CHLORAPREP 26 ML APPLICATOR - ORANGE TINT(25/CA)

## (undated) DEVICE — SET LEADWIRE 5 LEAD BEDSIDE DISPOSABLE ECG (1SET OF 5/EA)

## (undated) DEVICE — MASK ANESTHESIA ADULT  - (100/CA)

## (undated) DEVICE — GOWN WARMING STANDARD FLEX - (30/CA)